# Patient Record
Sex: FEMALE | Race: WHITE | NOT HISPANIC OR LATINO | Employment: FULL TIME | URBAN - METROPOLITAN AREA
[De-identification: names, ages, dates, MRNs, and addresses within clinical notes are randomized per-mention and may not be internally consistent; named-entity substitution may affect disease eponyms.]

---

## 2017-01-05 ENCOUNTER — ALLSCRIPTS OFFICE VISIT (OUTPATIENT)
Dept: PERINATAL CARE | Facility: CLINIC | Age: 37
End: 2017-01-05
Payer: COMMERCIAL

## 2017-01-05 PROCEDURE — 96372 THER/PROPH/DIAG INJ SC/IM: CPT | Performed by: OBSTETRICS & GYNECOLOGY

## 2017-01-13 ENCOUNTER — APPOINTMENT (OUTPATIENT)
Dept: PERINATAL CARE | Facility: CLINIC | Age: 37
End: 2017-01-13
Payer: COMMERCIAL

## 2017-01-13 ENCOUNTER — GENERIC CONVERSION - ENCOUNTER (OUTPATIENT)
Dept: OTHER | Facility: OTHER | Age: 37
End: 2017-01-13

## 2017-01-13 PROCEDURE — 96372 THER/PROPH/DIAG INJ SC/IM: CPT | Performed by: OBSTETRICS & GYNECOLOGY

## 2017-01-19 ENCOUNTER — GENERIC CONVERSION - ENCOUNTER (OUTPATIENT)
Dept: OTHER | Facility: OTHER | Age: 37
End: 2017-01-19

## 2017-01-19 ENCOUNTER — APPOINTMENT (OUTPATIENT)
Dept: PERINATAL CARE | Facility: CLINIC | Age: 37
End: 2017-01-19
Payer: COMMERCIAL

## 2017-01-19 ENCOUNTER — ALLSCRIPTS OFFICE VISIT (OUTPATIENT)
Dept: PERINATAL CARE | Facility: CLINIC | Age: 37
End: 2017-01-19
Payer: COMMERCIAL

## 2017-01-19 PROCEDURE — 76817 TRANSVAGINAL US OBSTETRIC: CPT | Performed by: OBSTETRICS & GYNECOLOGY

## 2017-01-19 PROCEDURE — 76805 OB US >/= 14 WKS SNGL FETUS: CPT | Performed by: OBSTETRICS & GYNECOLOGY

## 2017-01-26 ENCOUNTER — APPOINTMENT (OUTPATIENT)
Dept: PERINATAL CARE | Facility: CLINIC | Age: 37
End: 2017-01-26
Payer: COMMERCIAL

## 2017-01-26 ENCOUNTER — GENERIC CONVERSION - ENCOUNTER (OUTPATIENT)
Dept: OTHER | Facility: OTHER | Age: 37
End: 2017-01-26

## 2017-01-26 ENCOUNTER — ALLSCRIPTS OFFICE VISIT (OUTPATIENT)
Dept: OTHER | Facility: OTHER | Age: 37
End: 2017-01-26

## 2017-01-26 PROCEDURE — 96372 THER/PROPH/DIAG INJ SC/IM: CPT | Performed by: OBSTETRICS & GYNECOLOGY

## 2017-02-02 ENCOUNTER — GENERIC CONVERSION - ENCOUNTER (OUTPATIENT)
Dept: OTHER | Facility: OTHER | Age: 37
End: 2017-02-02

## 2017-02-02 ENCOUNTER — APPOINTMENT (OUTPATIENT)
Dept: PERINATAL CARE | Facility: CLINIC | Age: 37
End: 2017-02-02
Payer: COMMERCIAL

## 2017-02-02 PROCEDURE — 96372 THER/PROPH/DIAG INJ SC/IM: CPT | Performed by: OBSTETRICS & GYNECOLOGY

## 2017-02-06 ENCOUNTER — HOSPITAL ENCOUNTER (OUTPATIENT)
Facility: HOSPITAL | Age: 37
Discharge: HOME | End: 2017-02-06
Attending: OBSTETRICS & GYNECOLOGY | Admitting: OBSTETRICS & GYNECOLOGY
Payer: COMMERCIAL

## 2017-02-06 VITALS
DIASTOLIC BLOOD PRESSURE: 61 MMHG | HEART RATE: 95 BPM | HEIGHT: 66 IN | WEIGHT: 163 LBS | SYSTOLIC BLOOD PRESSURE: 121 MMHG | RESPIRATION RATE: 18 BRPM | BODY MASS INDEX: 26.2 KG/M2 | TEMPERATURE: 98.1 F

## 2017-02-06 DIAGNOSIS — O44.02 PLACENTA PREVIA ANTEPARTUM IN SECOND TRIMESTER: ICD-10-CM

## 2017-02-06 DIAGNOSIS — O26.892 PELVIC PAIN AFFECTING PREGNANCY IN SECOND TRIMESTER, ANTEPARTUM: ICD-10-CM

## 2017-02-06 DIAGNOSIS — R10.2 PELVIC PAIN AFFECTING PREGNANCY IN SECOND TRIMESTER, ANTEPARTUM: ICD-10-CM

## 2017-02-06 PROCEDURE — 99204 OFFICE O/P NEW MOD 45 MIN: CPT

## 2017-02-06 RX ORDER — HYDROXYPROGESTERONE CAPROATE 250 MG/ML
250 INJECTION INTRAMUSCULAR
Status: ON HOLD | COMMUNITY
End: 2017-05-15 | Stop reason: ALTCHOICE

## 2017-02-06 RX ORDER — ACETAMINOPHEN 325 MG/1
650 TABLET ORAL EVERY 6 HOURS PRN
Status: ON HOLD | COMMUNITY
End: 2017-05-26 | Stop reason: CLARIF

## 2017-02-09 ENCOUNTER — ALLSCRIPTS OFFICE VISIT (OUTPATIENT)
Dept: PERINATAL CARE | Facility: CLINIC | Age: 37
End: 2017-02-09
Payer: COMMERCIAL

## 2017-02-09 ENCOUNTER — GENERIC CONVERSION - ENCOUNTER (OUTPATIENT)
Dept: OTHER | Facility: OTHER | Age: 37
End: 2017-02-09

## 2017-02-09 PROCEDURE — 76816 OB US FOLLOW-UP PER FETUS: CPT | Performed by: OBSTETRICS & GYNECOLOGY

## 2017-02-09 PROCEDURE — 76817 TRANSVAGINAL US OBSTETRIC: CPT | Performed by: OBSTETRICS & GYNECOLOGY

## 2017-02-15 ENCOUNTER — ALLSCRIPTS OFFICE VISIT (OUTPATIENT)
Dept: OTHER | Facility: OTHER | Age: 37
End: 2017-02-15

## 2017-02-15 DIAGNOSIS — O99.810 ABNORMAL GLUCOSE COMPLICATING PREGNANCY: ICD-10-CM

## 2017-02-15 DIAGNOSIS — Z36.9 ENCOUNTER FOR ANTENATAL SCREENING OF MOTHER: ICD-10-CM

## 2017-02-15 LAB
GLUCOSE (HISTORICAL): NORMAL
PROT UR STRIP-MCNC: NORMAL MG/DL

## 2017-02-16 ENCOUNTER — APPOINTMENT (OUTPATIENT)
Dept: PERINATAL CARE | Facility: CLINIC | Age: 37
End: 2017-02-16
Payer: COMMERCIAL

## 2017-02-16 ENCOUNTER — GENERIC CONVERSION - ENCOUNTER (OUTPATIENT)
Dept: OTHER | Facility: OTHER | Age: 37
End: 2017-02-16

## 2017-02-16 PROCEDURE — 96372 THER/PROPH/DIAG INJ SC/IM: CPT | Performed by: OBSTETRICS & GYNECOLOGY

## 2017-02-17 ENCOUNTER — GENERIC CONVERSION - ENCOUNTER (OUTPATIENT)
Dept: OTHER | Facility: OTHER | Age: 37
End: 2017-02-17

## 2017-02-23 ENCOUNTER — ALLSCRIPTS OFFICE VISIT (OUTPATIENT)
Dept: PERINATAL CARE | Facility: CLINIC | Age: 37
End: 2017-02-23
Payer: COMMERCIAL

## 2017-02-23 PROCEDURE — 96372 THER/PROPH/DIAG INJ SC/IM: CPT | Performed by: OBSTETRICS & GYNECOLOGY

## 2017-02-25 ENCOUNTER — APPOINTMENT (OUTPATIENT)
Dept: LAB | Facility: CLINIC | Age: 37
End: 2017-02-25
Payer: COMMERCIAL

## 2017-02-25 DIAGNOSIS — Z36.9 ENCOUNTER FOR ANTENATAL SCREENING OF MOTHER: ICD-10-CM

## 2017-02-25 LAB
BASOPHILS # BLD AUTO: 0.02 THOUSANDS/ΜL (ref 0–0.1)
BASOPHILS NFR BLD AUTO: 0 % (ref 0–1)
EOSINOPHIL # BLD AUTO: 0.11 THOUSAND/ΜL (ref 0–0.61)
EOSINOPHIL NFR BLD AUTO: 1 % (ref 0–6)
ERYTHROCYTE [DISTWIDTH] IN BLOOD BY AUTOMATED COUNT: 14.7 % (ref 11.6–15.1)
GLUCOSE 1H P 50 G GLC PO SERPL-MCNC: 145 MG/DL
HCT VFR BLD AUTO: 35.5 % (ref 34.8–46.1)
HGB BLD-MCNC: 11.8 G/DL (ref 11.5–15.4)
LYMPHOCYTES # BLD AUTO: 2.49 THOUSANDS/ΜL (ref 0.6–4.47)
LYMPHOCYTES NFR BLD AUTO: 20 % (ref 14–44)
MCH RBC QN AUTO: 29.1 PG (ref 26.8–34.3)
MCHC RBC AUTO-ENTMCNC: 33.2 G/DL (ref 31.4–37.4)
MCV RBC AUTO: 87 FL (ref 82–98)
MONOCYTES # BLD AUTO: 0.47 THOUSAND/ΜL (ref 0.17–1.22)
MONOCYTES NFR BLD AUTO: 4 % (ref 4–12)
NEUTROPHILS # BLD AUTO: 9.41 THOUSANDS/ΜL (ref 1.85–7.62)
NEUTS SEG NFR BLD AUTO: 75 % (ref 43–75)
NRBC BLD AUTO-RTO: 0 /100 WBCS
PLATELET # BLD AUTO: 262 THOUSANDS/UL (ref 149–390)
PMV BLD AUTO: 10.5 FL (ref 8.9–12.7)
RBC # BLD AUTO: 4.06 MILLION/UL (ref 3.81–5.12)
WBC # BLD AUTO: 12.55 THOUSAND/UL (ref 4.31–10.16)

## 2017-02-25 PROCEDURE — 85025 COMPLETE CBC W/AUTO DIFF WBC: CPT

## 2017-02-25 PROCEDURE — 86592 SYPHILIS TEST NON-TREP QUAL: CPT

## 2017-02-25 PROCEDURE — 82950 GLUCOSE TEST: CPT

## 2017-02-25 PROCEDURE — 36415 COLL VENOUS BLD VENIPUNCTURE: CPT

## 2017-02-27 LAB — RPR SER QL: NORMAL

## 2017-03-01 ENCOUNTER — APPOINTMENT (OUTPATIENT)
Dept: LAB | Facility: HOSPITAL | Age: 37
End: 2017-03-01
Attending: OBSTETRICS & GYNECOLOGY
Payer: COMMERCIAL

## 2017-03-01 DIAGNOSIS — O99.810 ABNORMAL GLUCOSE COMPLICATING PREGNANCY: ICD-10-CM

## 2017-03-01 LAB
GLUCOSE 1H P 100 G GLC PO SERPL-MCNC: 175 MG/DL (ref 65–179)
GLUCOSE 2H P 100 G GLC PO SERPL-MCNC: 164 MG/DL (ref 65–154)
GLUCOSE 3H P 100 G GLC PO SERPL-MCNC: 92 MG/DL (ref 65–139)
GLUCOSE P FAST SERPL-MCNC: 94 MG/DL (ref 65–99)
GLUCOSE SERPL-MCNC: 104 MG/DL (ref 65–140)

## 2017-03-01 PROCEDURE — 82951 GLUCOSE TOLERANCE TEST (GTT): CPT

## 2017-03-01 PROCEDURE — 36415 COLL VENOUS BLD VENIPUNCTURE: CPT

## 2017-03-01 PROCEDURE — 82948 REAGENT STRIP/BLOOD GLUCOSE: CPT

## 2017-03-01 PROCEDURE — 82952 GTT-ADDED SAMPLES: CPT

## 2017-03-02 ENCOUNTER — GENERIC CONVERSION - ENCOUNTER (OUTPATIENT)
Dept: OTHER | Facility: OTHER | Age: 37
End: 2017-03-02

## 2017-03-02 ENCOUNTER — APPOINTMENT (OUTPATIENT)
Dept: PERINATAL CARE | Facility: CLINIC | Age: 37
End: 2017-03-02
Payer: COMMERCIAL

## 2017-03-02 PROCEDURE — 96372 THER/PROPH/DIAG INJ SC/IM: CPT | Performed by: OBSTETRICS & GYNECOLOGY

## 2017-03-09 ENCOUNTER — GENERIC CONVERSION - ENCOUNTER (OUTPATIENT)
Dept: OTHER | Facility: OTHER | Age: 37
End: 2017-03-09

## 2017-03-09 ENCOUNTER — APPOINTMENT (OUTPATIENT)
Dept: PERINATAL CARE | Facility: CLINIC | Age: 37
End: 2017-03-09
Payer: COMMERCIAL

## 2017-03-09 PROCEDURE — 96372 THER/PROPH/DIAG INJ SC/IM: CPT | Performed by: OBSTETRICS & GYNECOLOGY

## 2017-03-13 ENCOUNTER — GENERIC CONVERSION - ENCOUNTER (OUTPATIENT)
Dept: OTHER | Facility: OTHER | Age: 37
End: 2017-03-13

## 2017-03-16 ENCOUNTER — ALLSCRIPTS OFFICE VISIT (OUTPATIENT)
Dept: PERINATAL CARE | Facility: CLINIC | Age: 37
End: 2017-03-16
Payer: COMMERCIAL

## 2017-03-16 PROCEDURE — 96372 THER/PROPH/DIAG INJ SC/IM: CPT | Performed by: OBSTETRICS & GYNECOLOGY

## 2017-03-17 ENCOUNTER — ALLSCRIPTS OFFICE VISIT (OUTPATIENT)
Dept: OTHER | Facility: OTHER | Age: 37
End: 2017-03-17

## 2017-03-17 ENCOUNTER — GENERIC CONVERSION - ENCOUNTER (OUTPATIENT)
Dept: OTHER | Facility: OTHER | Age: 37
End: 2017-03-17

## 2017-03-23 ENCOUNTER — ALLSCRIPTS OFFICE VISIT (OUTPATIENT)
Dept: PERINATAL CARE | Facility: CLINIC | Age: 37
End: 2017-03-23
Payer: COMMERCIAL

## 2017-03-23 ENCOUNTER — GENERIC CONVERSION - ENCOUNTER (OUTPATIENT)
Dept: OTHER | Facility: OTHER | Age: 37
End: 2017-03-23

## 2017-03-23 PROCEDURE — 96372 THER/PROPH/DIAG INJ SC/IM: CPT | Performed by: OBSTETRICS & GYNECOLOGY

## 2017-03-23 PROCEDURE — 76816 OB US FOLLOW-UP PER FETUS: CPT | Performed by: OBSTETRICS & GYNECOLOGY

## 2017-03-30 ENCOUNTER — APPOINTMENT (OUTPATIENT)
Dept: PERINATAL CARE | Facility: CLINIC | Age: 37
End: 2017-03-30
Payer: COMMERCIAL

## 2017-03-30 ENCOUNTER — GENERIC CONVERSION - ENCOUNTER (OUTPATIENT)
Dept: OTHER | Facility: OTHER | Age: 37
End: 2017-03-30

## 2017-03-30 PROCEDURE — 96372 THER/PROPH/DIAG INJ SC/IM: CPT | Performed by: OBSTETRICS & GYNECOLOGY

## 2017-04-04 ENCOUNTER — GENERIC CONVERSION - ENCOUNTER (OUTPATIENT)
Dept: OTHER | Facility: OTHER | Age: 37
End: 2017-04-04

## 2017-04-06 ENCOUNTER — APPOINTMENT (OUTPATIENT)
Dept: PERINATAL CARE | Facility: CLINIC | Age: 37
End: 2017-04-06
Payer: COMMERCIAL

## 2017-04-06 ENCOUNTER — GENERIC CONVERSION - ENCOUNTER (OUTPATIENT)
Dept: OTHER | Facility: OTHER | Age: 37
End: 2017-04-06

## 2017-04-06 PROCEDURE — 96372 THER/PROPH/DIAG INJ SC/IM: CPT | Performed by: OBSTETRICS & GYNECOLOGY

## 2017-04-13 ENCOUNTER — APPOINTMENT (OUTPATIENT)
Dept: PERINATAL CARE | Facility: CLINIC | Age: 37
End: 2017-04-13
Payer: COMMERCIAL

## 2017-04-13 ENCOUNTER — GENERIC CONVERSION - ENCOUNTER (OUTPATIENT)
Dept: OTHER | Facility: OTHER | Age: 37
End: 2017-04-13

## 2017-04-13 PROCEDURE — 96372 THER/PROPH/DIAG INJ SC/IM: CPT | Performed by: OBSTETRICS & GYNECOLOGY

## 2017-04-19 ENCOUNTER — GENERIC CONVERSION - ENCOUNTER (OUTPATIENT)
Dept: OTHER | Facility: OTHER | Age: 37
End: 2017-04-19

## 2017-04-20 ENCOUNTER — APPOINTMENT (OUTPATIENT)
Dept: PERINATAL CARE | Facility: CLINIC | Age: 37
End: 2017-04-20
Payer: COMMERCIAL

## 2017-04-20 ENCOUNTER — GENERIC CONVERSION - ENCOUNTER (OUTPATIENT)
Dept: OTHER | Facility: OTHER | Age: 37
End: 2017-04-20

## 2017-04-20 PROCEDURE — 96372 THER/PROPH/DIAG INJ SC/IM: CPT | Performed by: OBSTETRICS & GYNECOLOGY

## 2017-04-27 ENCOUNTER — APPOINTMENT (OUTPATIENT)
Dept: PERINATAL CARE | Facility: CLINIC | Age: 37
End: 2017-04-27
Payer: COMMERCIAL

## 2017-04-27 PROCEDURE — 96372 THER/PROPH/DIAG INJ SC/IM: CPT | Performed by: OBSTETRICS & GYNECOLOGY

## 2017-04-28 ENCOUNTER — HOSPITAL ENCOUNTER (OUTPATIENT)
Facility: HOSPITAL | Age: 37
Discharge: HOME | End: 2017-04-28
Attending: OBSTETRICS & GYNECOLOGY | Admitting: OBSTETRICS & GYNECOLOGY
Payer: COMMERCIAL

## 2017-04-28 ENCOUNTER — HOSPITAL ENCOUNTER (OUTPATIENT)
Facility: HOSPITAL | Age: 37
End: 2017-04-28
Attending: OBSTETRICS & GYNECOLOGY | Admitting: OBSTETRICS & GYNECOLOGY
Payer: COMMERCIAL

## 2017-04-28 VITALS
OXYGEN SATURATION: 100 % | DIASTOLIC BLOOD PRESSURE: 71 MMHG | BODY MASS INDEX: 29.66 KG/M2 | HEART RATE: 107 BPM | SYSTOLIC BLOOD PRESSURE: 126 MMHG | HEIGHT: 65 IN | WEIGHT: 178 LBS | RESPIRATION RATE: 18 BRPM | TEMPERATURE: 98.1 F

## 2017-04-28 PROCEDURE — 76815 OB US LIMITED FETUS(S): CPT | Performed by: OBSTETRICS & GYNECOLOGY

## 2017-04-28 PROCEDURE — 99214 OFFICE O/P EST MOD 30 MIN: CPT

## 2017-05-02 ENCOUNTER — GENERIC CONVERSION - ENCOUNTER (OUTPATIENT)
Dept: OTHER | Facility: OTHER | Age: 37
End: 2017-05-02

## 2017-05-02 LAB — EXTERNAL GROUP B STREP ANTIGEN: NEGATIVE

## 2017-05-04 ENCOUNTER — APPOINTMENT (OUTPATIENT)
Dept: PERINATAL CARE | Facility: CLINIC | Age: 37
End: 2017-05-04
Payer: COMMERCIAL

## 2017-05-04 ENCOUNTER — GENERIC CONVERSION - ENCOUNTER (OUTPATIENT)
Dept: OTHER | Facility: OTHER | Age: 37
End: 2017-05-04

## 2017-05-04 PROCEDURE — 96372 THER/PROPH/DIAG INJ SC/IM: CPT | Performed by: OBSTETRICS & GYNECOLOGY

## 2017-05-11 ENCOUNTER — GENERIC CONVERSION - ENCOUNTER (OUTPATIENT)
Dept: OTHER | Facility: OTHER | Age: 37
End: 2017-05-11

## 2017-05-11 ENCOUNTER — ALLSCRIPTS OFFICE VISIT (OUTPATIENT)
Dept: PERINATAL CARE | Facility: CLINIC | Age: 37
End: 2017-05-11
Payer: COMMERCIAL

## 2017-05-11 ENCOUNTER — ALLSCRIPTS OFFICE VISIT (OUTPATIENT)
Dept: OTHER | Facility: OTHER | Age: 37
End: 2017-05-11

## 2017-05-11 PROCEDURE — 76816 OB US FOLLOW-UP PER FETUS: CPT | Performed by: OBSTETRICS & GYNECOLOGY

## 2017-05-11 PROCEDURE — 96372 THER/PROPH/DIAG INJ SC/IM: CPT | Performed by: OBSTETRICS & GYNECOLOGY

## 2017-05-15 ENCOUNTER — HOSPITAL ENCOUNTER (OUTPATIENT)
Facility: HOSPITAL | Age: 37
Discharge: HOME | End: 2017-05-15
Attending: OBSTETRICS & GYNECOLOGY | Admitting: OBSTETRICS & GYNECOLOGY
Payer: COMMERCIAL

## 2017-05-15 VITALS
HEART RATE: 106 BPM | RESPIRATION RATE: 18 BRPM | SYSTOLIC BLOOD PRESSURE: 133 MMHG | DIASTOLIC BLOOD PRESSURE: 77 MMHG | TEMPERATURE: 97.6 F | OXYGEN SATURATION: 98 %

## 2017-05-15 PROCEDURE — 99213 OFFICE O/P EST LOW 20 MIN: CPT

## 2017-05-15 RX ORDER — AMOXICILLIN AND CLAVULANATE POTASSIUM 875; 125 MG/1; MG/1
1 TABLET, FILM COATED ORAL 2 TIMES DAILY
Status: ON HOLD | COMMUNITY
End: 2017-05-26 | Stop reason: ALTCHOICE

## 2017-05-17 ENCOUNTER — ALLSCRIPTS OFFICE VISIT (OUTPATIENT)
Dept: OTHER | Facility: OTHER | Age: 37
End: 2017-05-17

## 2017-05-23 ENCOUNTER — ALLSCRIPTS OFFICE VISIT (OUTPATIENT)
Dept: PERINATAL CARE | Facility: CLINIC | Age: 37
End: 2017-05-23
Payer: COMMERCIAL

## 2017-05-23 ENCOUNTER — GENERIC CONVERSION - ENCOUNTER (OUTPATIENT)
Dept: OTHER | Facility: OTHER | Age: 37
End: 2017-05-23

## 2017-05-23 PROCEDURE — 76818 FETAL BIOPHYS PROFILE W/NST: CPT | Performed by: OBSTETRICS & GYNECOLOGY

## 2017-05-26 ENCOUNTER — ANESTHESIA (INPATIENT)
Dept: LABOR AND DELIVERY | Facility: HOSPITAL | Age: 37
End: 2017-05-26
Payer: COMMERCIAL

## 2017-05-26 ENCOUNTER — ANESTHESIA EVENT (INPATIENT)
Dept: LABOR AND DELIVERY | Facility: HOSPITAL | Age: 37
End: 2017-05-26
Payer: COMMERCIAL

## 2017-05-26 ENCOUNTER — HOSPITAL ENCOUNTER (INPATIENT)
Facility: HOSPITAL | Age: 37
LOS: 3 days | Discharge: HOME | End: 2017-05-29
Attending: OBSTETRICS & GYNECOLOGY | Admitting: OBSTETRICS & GYNECOLOGY
Payer: COMMERCIAL

## 2017-05-26 DIAGNOSIS — Z98.891 PREVIOUS CESAREAN SECTION: ICD-10-CM

## 2017-05-26 DIAGNOSIS — Z3A.38 38 WEEKS GESTATION OF PREGNANCY: Primary | ICD-10-CM

## 2017-05-26 DIAGNOSIS — O42.90 PREMATURE RUPTURE OF MEMBRANES: ICD-10-CM

## 2017-05-26 LAB
ABO GROUP BLD: NORMAL
BASE EXCESS BLDCOA CALC-SCNC: 0.6 MMOL/L (ref 3–11)
BASE EXCESS BLDCOV CALC-SCNC: -0.4 MMOL/L (ref 1–9)
BASOPHILS # BLD AUTO: 0.02 THOUSANDS/ΜL (ref 0–0.1)
BASOPHILS NFR BLD AUTO: 0 % (ref 0–1)
BLD GP AB SCN SERPL QL: NEGATIVE
EOSINOPHIL # BLD AUTO: 0.08 THOUSAND/ΜL (ref 0–0.61)
EOSINOPHIL NFR BLD AUTO: 1 % (ref 0–6)
ERYTHROCYTE [DISTWIDTH] IN BLOOD BY AUTOMATED COUNT: 14 % (ref 11.6–15.1)
HCO3 BLDCOA-SCNC: 29.2 MMOL/L (ref 17.3–27.3)
HCO3 BLDCOV-SCNC: 26 MMOL/L (ref 12.2–28.6)
HCT VFR BLD AUTO: 39.9 % (ref 34.8–46.1)
HGB BLD-MCNC: 13.5 G/DL (ref 11.5–15.4)
LYMPHOCYTES # BLD AUTO: 2.47 THOUSANDS/ΜL (ref 0.6–4.47)
LYMPHOCYTES NFR BLD AUTO: 19 % (ref 14–44)
MCH RBC QN AUTO: 28.8 PG (ref 26.8–34.3)
MCHC RBC AUTO-ENTMCNC: 33.8 G/DL (ref 31.4–37.4)
MCV RBC AUTO: 85 FL (ref 82–98)
MONOCYTES # BLD AUTO: 0.52 THOUSAND/ΜL (ref 0.17–1.22)
MONOCYTES NFR BLD AUTO: 4 % (ref 4–12)
NEUTROPHILS # BLD AUTO: 9.84 THOUSANDS/ΜL (ref 1.85–7.62)
NEUTS SEG NFR BLD AUTO: 76 % (ref 43–75)
NRBC BLD AUTO-RTO: 0 /100 WBCS
O2 CT VFR BLDCOA CALC: 5.5 ML/DL
OXYHGB MFR BLDCOA: 24.7 %
OXYHGB MFR BLDCOV: 56 %
PCO2 BLDCOA: 63.2 MM[HG] (ref 30–60)
PCO2 BLDCOV: 48.9 MM HG (ref 27–43)
PH BLDCOA: 7.28 [PH] (ref 7.23–7.43)
PH BLDCOV: 7.34 [PH] (ref 7.19–7.49)
PLATELET # BLD AUTO: 244 THOUSANDS/UL (ref 149–390)
PMV BLD AUTO: 10.6 FL (ref 8.9–12.7)
PO2 BLDCOA: 14.5 MM HG (ref 5–25)
PO2 BLDCOV: 24.2 MM HG (ref 15–45)
RBC # BLD AUTO: 4.69 MILLION/UL (ref 3.81–5.12)
RH BLD: POSITIVE
RPR SER QL: NORMAL
SAO2 % BLDCOV: 12.6 ML/DL
SPECIMEN EXPIRATION DATE: NORMAL
WBC # BLD AUTO: 12.98 THOUSAND/UL (ref 4.31–10.16)

## 2017-05-26 PROCEDURE — 88302 TISSUE EXAM BY PATHOLOGIST: CPT | Performed by: OBSTETRICS & GYNECOLOGY

## 2017-05-26 PROCEDURE — 82805 BLOOD GASES W/O2 SATURATION: CPT | Performed by: OBSTETRICS & GYNECOLOGY

## 2017-05-26 PROCEDURE — 86900 BLOOD TYPING SEROLOGIC ABO: CPT | Performed by: OBSTETRICS & GYNECOLOGY

## 2017-05-26 PROCEDURE — 85025 COMPLETE CBC W/AUTO DIFF WBC: CPT | Performed by: OBSTETRICS & GYNECOLOGY

## 2017-05-26 PROCEDURE — 0UB70ZZ EXCISION OF BILATERAL FALLOPIAN TUBES, OPEN APPROACH: ICD-10-PCS | Performed by: OBSTETRICS & GYNECOLOGY

## 2017-05-26 PROCEDURE — 86592 SYPHILIS TEST NON-TREP QUAL: CPT | Performed by: OBSTETRICS & GYNECOLOGY

## 2017-05-26 PROCEDURE — 99214 OFFICE O/P EST MOD 30 MIN: CPT

## 2017-05-26 PROCEDURE — 86850 RBC ANTIBODY SCREEN: CPT | Performed by: OBSTETRICS & GYNECOLOGY

## 2017-05-26 PROCEDURE — 86901 BLOOD TYPING SEROLOGIC RH(D): CPT | Performed by: OBSTETRICS & GYNECOLOGY

## 2017-05-26 RX ORDER — SODIUM CHLORIDE, SODIUM LACTATE, POTASSIUM CHLORIDE, CALCIUM CHLORIDE 600; 310; 30; 20 MG/100ML; MG/100ML; MG/100ML; MG/100ML
100 INJECTION, SOLUTION INTRAVENOUS CONTINUOUS
Status: DISCONTINUED | OUTPATIENT
Start: 2017-05-26 | End: 2017-05-29 | Stop reason: HOSPADM

## 2017-05-26 RX ORDER — NALOXONE HYDROCHLORIDE 0.4 MG/ML
0.1 INJECTION, SOLUTION INTRAMUSCULAR; INTRAVENOUS; SUBCUTANEOUS
Status: ACTIVE | OUTPATIENT
Start: 2017-05-26 | End: 2017-05-27

## 2017-05-26 RX ORDER — ONDANSETRON 2 MG/ML
4 INJECTION INTRAMUSCULAR; INTRAVENOUS ONCE AS NEEDED
Status: DISCONTINUED | OUTPATIENT
Start: 2017-05-26 | End: 2017-05-29 | Stop reason: HOSPADM

## 2017-05-26 RX ORDER — ONDANSETRON 2 MG/ML
4 INJECTION INTRAMUSCULAR; INTRAVENOUS EVERY 8 HOURS PRN
Status: DISCONTINUED | OUTPATIENT
Start: 2017-05-26 | End: 2017-05-29 | Stop reason: HOSPADM

## 2017-05-26 RX ORDER — SODIUM CHLORIDE, SODIUM LACTATE, POTASSIUM CHLORIDE, CALCIUM CHLORIDE 600; 310; 30; 20 MG/100ML; MG/100ML; MG/100ML; MG/100ML
125 INJECTION, SOLUTION INTRAVENOUS CONTINUOUS
Status: DISCONTINUED | OUTPATIENT
Start: 2017-05-26 | End: 2017-05-29 | Stop reason: HOSPADM

## 2017-05-26 RX ORDER — DIPHENHYDRAMINE HYDROCHLORIDE 50 MG/ML
25 INJECTION INTRAMUSCULAR; INTRAVENOUS EVERY 6 HOURS PRN
Status: ACTIVE | OUTPATIENT
Start: 2017-05-26 | End: 2017-05-27

## 2017-05-26 RX ORDER — CALCIUM CARBONATE 200(500)MG
1000 TABLET,CHEWABLE ORAL DAILY PRN
Status: DISCONTINUED | OUTPATIENT
Start: 2017-05-26 | End: 2017-05-29 | Stop reason: HOSPADM

## 2017-05-26 RX ORDER — FENTANYL CITRATE 50 UG/ML
INJECTION, SOLUTION INTRAMUSCULAR; INTRAVENOUS AS NEEDED
Status: DISCONTINUED | OUTPATIENT
Start: 2017-05-26 | End: 2017-05-26 | Stop reason: SURG

## 2017-05-26 RX ORDER — DIPHENHYDRAMINE HYDROCHLORIDE 50 MG/ML
25 INJECTION INTRAMUSCULAR; INTRAVENOUS EVERY 6 HOURS PRN
Status: DISCONTINUED | OUTPATIENT
Start: 2017-05-26 | End: 2017-05-29 | Stop reason: HOSPADM

## 2017-05-26 RX ORDER — ACETAMINOPHEN 325 MG/1
650 TABLET ORAL EVERY 6 HOURS PRN
Status: DISCONTINUED | OUTPATIENT
Start: 2017-05-26 | End: 2017-05-29 | Stop reason: HOSPADM

## 2017-05-26 RX ORDER — KETOROLAC TROMETHAMINE 30 MG/ML
30 INJECTION, SOLUTION INTRAMUSCULAR; INTRAVENOUS EVERY 6 HOURS
Status: DISCONTINUED | OUTPATIENT
Start: 2017-05-26 | End: 2017-05-29 | Stop reason: HOSPADM

## 2017-05-26 RX ORDER — BUPIVACAINE HYDROCHLORIDE 7.5 MG/ML
INJECTION, SOLUTION INTRASPINAL AS NEEDED
Status: DISCONTINUED | OUTPATIENT
Start: 2017-05-26 | End: 2017-05-26 | Stop reason: SURG

## 2017-05-26 RX ORDER — DIAPER,BRIEF,INFANT-TODD,DISP
1 EACH MISCELLANEOUS DAILY PRN
Status: DISCONTINUED | OUTPATIENT
Start: 2017-05-26 | End: 2017-05-29 | Stop reason: HOSPADM

## 2017-05-26 RX ORDER — SODIUM CHLORIDE 9 MG/ML
INJECTION, SOLUTION INTRAVENOUS CONTINUOUS PRN
Status: DISCONTINUED | OUTPATIENT
Start: 2017-05-26 | End: 2017-05-26

## 2017-05-26 RX ORDER — MIDAZOLAM HYDROCHLORIDE 1 MG/ML
INJECTION INTRAMUSCULAR; INTRAVENOUS AS NEEDED
Status: DISCONTINUED | OUTPATIENT
Start: 2017-05-26 | End: 2017-05-26 | Stop reason: SURG

## 2017-05-26 RX ORDER — ONDANSETRON 2 MG/ML
4 INJECTION INTRAMUSCULAR; INTRAVENOUS EVERY 8 HOURS PRN
Status: DISCONTINUED | OUTPATIENT
Start: 2017-05-26 | End: 2017-05-26

## 2017-05-26 RX ORDER — MORPHINE SULFATE 4 MG/ML
4 INJECTION, SOLUTION INTRAMUSCULAR; INTRAVENOUS
Status: DISCONTINUED | OUTPATIENT
Start: 2017-05-26 | End: 2017-05-29 | Stop reason: HOSPADM

## 2017-05-26 RX ORDER — KETOROLAC TROMETHAMINE 30 MG/ML
30 INJECTION, SOLUTION INTRAMUSCULAR; INTRAVENOUS ONCE AS NEEDED
Status: ACTIVE | OUTPATIENT
Start: 2017-05-26 | End: 2017-05-26

## 2017-05-26 RX ORDER — OXYCODONE HYDROCHLORIDE AND ACETAMINOPHEN 5; 325 MG/1; MG/1
2 TABLET ORAL EVERY 4 HOURS PRN
Status: DISCONTINUED | OUTPATIENT
Start: 2017-05-27 | End: 2017-05-29 | Stop reason: HOSPADM

## 2017-05-26 RX ORDER — FENTANYL CITRATE/PF 50 MCG/ML
25 SYRINGE (ML) INJECTION
Status: DISCONTINUED | OUTPATIENT
Start: 2017-05-26 | End: 2017-05-29 | Stop reason: HOSPADM

## 2017-05-26 RX ORDER — OXYCODONE HYDROCHLORIDE AND ACETAMINOPHEN 5; 325 MG/1; MG/1
1 TABLET ORAL EVERY 4 HOURS PRN
Status: DISCONTINUED | OUTPATIENT
Start: 2017-05-27 | End: 2017-05-29 | Stop reason: HOSPADM

## 2017-05-26 RX ORDER — ONDANSETRON 2 MG/ML
4 INJECTION INTRAMUSCULAR; INTRAVENOUS EVERY 4 HOURS PRN
Status: ACTIVE | OUTPATIENT
Start: 2017-05-26 | End: 2017-05-27

## 2017-05-26 RX ORDER — MEPERIDINE HYDROCHLORIDE 25 MG/ML
25 INJECTION INTRAMUSCULAR; INTRAVENOUS; SUBCUTANEOUS AS NEEDED
Status: DISCONTINUED | OUTPATIENT
Start: 2017-05-26 | End: 2017-05-29 | Stop reason: HOSPADM

## 2017-05-26 RX ORDER — CLINDAMYCIN PHOSPHATE 900 MG/50ML
900 INJECTION INTRAVENOUS ONCE
Status: COMPLETED | OUTPATIENT
Start: 2017-05-26 | End: 2017-05-26

## 2017-05-26 RX ORDER — METOCLOPRAMIDE HYDROCHLORIDE 5 MG/ML
5 INJECTION INTRAMUSCULAR; INTRAVENOUS EVERY 6 HOURS PRN
Status: ACTIVE | OUTPATIENT
Start: 2017-05-26 | End: 2017-05-27

## 2017-05-26 RX ORDER — SIMETHICONE 80 MG
80 TABLET,CHEWABLE ORAL 4 TIMES DAILY PRN
Status: DISCONTINUED | OUTPATIENT
Start: 2017-05-26 | End: 2017-05-29 | Stop reason: HOSPADM

## 2017-05-26 RX ORDER — DOCUSATE SODIUM 100 MG/1
100 CAPSULE, LIQUID FILLED ORAL 2 TIMES DAILY
Status: DISCONTINUED | OUTPATIENT
Start: 2017-05-26 | End: 2017-05-29 | Stop reason: HOSPADM

## 2017-05-26 RX ORDER — MORPHINE SULFATE 0.5 MG/ML
INJECTION, SOLUTION EPIDURAL; INTRATHECAL; INTRAVENOUS AS NEEDED
Status: DISCONTINUED | OUTPATIENT
Start: 2017-05-26 | End: 2017-05-26 | Stop reason: SURG

## 2017-05-26 RX ORDER — IBUPROFEN 600 MG/1
600 TABLET ORAL EVERY 6 HOURS PRN
Status: DISCONTINUED | OUTPATIENT
Start: 2017-05-26 | End: 2017-05-29 | Stop reason: HOSPADM

## 2017-05-26 RX ORDER — OXYCODONE HYDROCHLORIDE AND ACETAMINOPHEN 5; 325 MG/1; MG/1
2 TABLET ORAL EVERY 4 HOURS PRN
Status: DISCONTINUED | OUTPATIENT
Start: 2017-05-26 | End: 2017-05-29 | Stop reason: HOSPADM

## 2017-05-26 RX ORDER — NALBUPHINE HCL 10 MG/ML
5 AMPUL (ML) INJECTION
Status: DISPENSED | OUTPATIENT
Start: 2017-05-26 | End: 2017-05-27

## 2017-05-26 RX ADMIN — KETOROLAC TROMETHAMINE 30 MG: 30 INJECTION, SOLUTION INTRAMUSCULAR at 18:42

## 2017-05-26 RX ADMIN — KETOROLAC TROMETHAMINE 30 MG: 30 INJECTION, SOLUTION INTRAMUSCULAR at 12:49

## 2017-05-26 RX ADMIN — SODIUM CHLORIDE, SODIUM LACTATE, POTASSIUM CHLORIDE, AND CALCIUM CHLORIDE: .6; .31; .03; .02 INJECTION, SOLUTION INTRAVENOUS at 10:35

## 2017-05-26 RX ADMIN — NALBUPHINE HYDROCHLORIDE 5 MG: 10 INJECTION, SOLUTION INTRAMUSCULAR; INTRAVENOUS; SUBCUTANEOUS at 22:36

## 2017-05-26 RX ADMIN — PHENYLEPHRINE HYDROCHLORIDE 50 MCG/MIN: 10 INJECTION INTRAVENOUS at 09:45

## 2017-05-26 RX ADMIN — MIDAZOLAM HYDROCHLORIDE 1 MG: 1 INJECTION, SOLUTION INTRAMUSCULAR; INTRAVENOUS at 10:50

## 2017-05-26 RX ADMIN — SODIUM CHLORIDE, SODIUM LACTATE, POTASSIUM CHLORIDE, AND CALCIUM CHLORIDE 125 ML/HR: .6; .31; .03; .02 INJECTION, SOLUTION INTRAVENOUS at 08:59

## 2017-05-26 RX ADMIN — GENTAMICIN SULFATE 90 MG: 40 INJECTION, SOLUTION INTRAMUSCULAR; INTRAVENOUS at 09:40

## 2017-05-26 RX ADMIN — NALBUPHINE HYDROCHLORIDE 5 MG: 10 INJECTION, SOLUTION INTRAMUSCULAR; INTRAVENOUS; SUBCUTANEOUS at 18:45

## 2017-05-26 RX ADMIN — ONDANSETRON 4 MG: 2 INJECTION INTRAMUSCULAR; INTRAVENOUS at 10:35

## 2017-05-26 RX ADMIN — FENTANYL CITRATE 50 MCG: 50 INJECTION, SOLUTION INTRAMUSCULAR; INTRAVENOUS at 10:40

## 2017-05-26 RX ADMIN — NALBUPHINE HYDROCHLORIDE 5 MG: 10 INJECTION, SOLUTION INTRAMUSCULAR; INTRAVENOUS; SUBCUTANEOUS at 11:55

## 2017-05-26 RX ADMIN — AZITHROMYCIN MONOHYDRATE 500 MG: 500 INJECTION, POWDER, LYOPHILIZED, FOR SOLUTION INTRAVENOUS at 09:50

## 2017-05-26 RX ADMIN — SODIUM CHLORIDE, SODIUM LACTATE, POTASSIUM CHLORIDE, AND CALCIUM CHLORIDE 125 ML/HR: .6; .31; .03; .02 INJECTION, SOLUTION INTRAVENOUS at 14:01

## 2017-05-26 RX ADMIN — NALBUPHINE HYDROCHLORIDE 5 MG: 10 INJECTION, SOLUTION INTRAMUSCULAR; INTRAVENOUS; SUBCUTANEOUS at 14:54

## 2017-05-26 RX ADMIN — MORPHINE SULFATE 1 MG: 0.5 INJECTION, SOLUTION EPIDURAL; INTRATHECAL; INTRAVENOUS at 10:41

## 2017-05-26 RX ADMIN — FENTANYL CITRATE 50 MCG: 50 INJECTION, SOLUTION INTRAMUSCULAR; INTRAVENOUS at 10:45

## 2017-05-26 RX ADMIN — MORPHINE SULFATE 0.25 MG: 0.5 INJECTION, SOLUTION EPIDURAL; INTRATHECAL; INTRAVENOUS at 09:39

## 2017-05-26 RX ADMIN — OXYTOCIN 250 MILLI-UNITS/MIN: 10 INJECTION, SOLUTION INTRAMUSCULAR; INTRAVENOUS at 10:13

## 2017-05-26 RX ADMIN — SODIUM CHLORIDE, SODIUM LACTATE, POTASSIUM CHLORIDE, AND CALCIUM CHLORIDE 125 ML/HR: .6; .31; .03; .02 INJECTION, SOLUTION INTRAVENOUS at 08:15

## 2017-05-26 RX ADMIN — MORPHINE SULFATE 1 MG: 0.5 INJECTION, SOLUTION EPIDURAL; INTRATHECAL; INTRAVENOUS at 10:35

## 2017-05-26 RX ADMIN — MORPHINE SULFATE 2.75 MG: 0.5 INJECTION, SOLUTION EPIDURAL; INTRATHECAL; INTRAVENOUS at 10:30

## 2017-05-26 RX ADMIN — MIDAZOLAM HYDROCHLORIDE 1 MG: 1 INJECTION, SOLUTION INTRAMUSCULAR; INTRAVENOUS at 10:35

## 2017-05-26 RX ADMIN — MIDAZOLAM HYDROCHLORIDE 1 MG: 1 INJECTION, SOLUTION INTRAMUSCULAR; INTRAVENOUS at 10:30

## 2017-05-26 RX ADMIN — BUPIVACAINE HYDROCHLORIDE IN DEXTROSE 2 ML: 7.5 INJECTION, SOLUTION SUBARACHNOID at 09:39

## 2017-05-26 RX ADMIN — ONDANSETRON 4 MG: 2 INJECTION INTRAMUSCULAR; INTRAVENOUS at 14:37

## 2017-05-26 RX ADMIN — SODIUM CHLORIDE, SODIUM LACTATE, POTASSIUM CHLORIDE, AND CALCIUM CHLORIDE: .6; .31; .03; .02 INJECTION, SOLUTION INTRAVENOUS at 09:00

## 2017-05-26 RX ADMIN — CLINDAMYCIN PHOSPHATE 900 MG: 18 INJECTION, SOLUTION INTRAMUSCULAR; INTRAVENOUS at 09:29

## 2017-05-26 RX ADMIN — MIDAZOLAM HYDROCHLORIDE 1 MG: 1 INJECTION, SOLUTION INTRAMUSCULAR; INTRAVENOUS at 10:45

## 2017-05-27 LAB
BASOPHILS # BLD AUTO: 0.02 THOUSANDS/ΜL (ref 0–0.1)
BASOPHILS NFR BLD AUTO: 0 % (ref 0–1)
EOSINOPHIL # BLD AUTO: 0.06 THOUSAND/ΜL (ref 0–0.61)
EOSINOPHIL NFR BLD AUTO: 0 % (ref 0–6)
ERYTHROCYTE [DISTWIDTH] IN BLOOD BY AUTOMATED COUNT: 14.2 % (ref 11.6–15.1)
HCT VFR BLD AUTO: 30.5 % (ref 34.8–46.1)
HGB BLD-MCNC: 10.2 G/DL (ref 11.5–15.4)
LYMPHOCYTES # BLD AUTO: 2.41 THOUSANDS/ΜL (ref 0.6–4.47)
LYMPHOCYTES NFR BLD AUTO: 16 % (ref 14–44)
MCH RBC QN AUTO: 28.9 PG (ref 26.8–34.3)
MCHC RBC AUTO-ENTMCNC: 33.4 G/DL (ref 31.4–37.4)
MCV RBC AUTO: 86 FL (ref 82–98)
MONOCYTES # BLD AUTO: 0.75 THOUSAND/ΜL (ref 0.17–1.22)
MONOCYTES NFR BLD AUTO: 5 % (ref 4–12)
NEUTROPHILS # BLD AUTO: 11.41 THOUSANDS/ΜL (ref 1.85–7.62)
NEUTS SEG NFR BLD AUTO: 79 % (ref 43–75)
NRBC BLD AUTO-RTO: 0 /100 WBCS
PLATELET # BLD AUTO: 214 THOUSANDS/UL (ref 149–390)
PMV BLD AUTO: 10.7 FL (ref 8.9–12.7)
RBC # BLD AUTO: 3.53 MILLION/UL (ref 3.81–5.12)
WBC # BLD AUTO: 14.71 THOUSAND/UL (ref 4.31–10.16)

## 2017-05-27 PROCEDURE — 85025 COMPLETE CBC W/AUTO DIFF WBC: CPT | Performed by: OBSTETRICS & GYNECOLOGY

## 2017-05-27 RX ADMIN — NALBUPHINE HYDROCHLORIDE 5 MG: 10 INJECTION, SOLUTION INTRAMUSCULAR; INTRAVENOUS; SUBCUTANEOUS at 06:05

## 2017-05-27 RX ADMIN — KETOROLAC TROMETHAMINE 30 MG: 30 INJECTION, SOLUTION INTRAMUSCULAR at 00:10

## 2017-05-27 RX ADMIN — SODIUM CHLORIDE, SODIUM LACTATE, POTASSIUM CHLORIDE, AND CALCIUM CHLORIDE 125 ML/HR: .6; .31; .03; .02 INJECTION, SOLUTION INTRAVENOUS at 00:35

## 2017-05-27 RX ADMIN — DOCUSATE SODIUM 100 MG: 100 CAPSULE, LIQUID FILLED ORAL at 08:45

## 2017-05-27 RX ADMIN — KETOROLAC TROMETHAMINE 30 MG: 30 INJECTION, SOLUTION INTRAMUSCULAR at 19:12

## 2017-05-27 RX ADMIN — OXYCODONE HYDROCHLORIDE AND ACETAMINOPHEN 1 TABLET: 5; 325 TABLET ORAL at 15:48

## 2017-05-27 RX ADMIN — IBUPROFEN 600 MG: 600 TABLET, FILM COATED ORAL at 12:15

## 2017-05-27 RX ADMIN — KETOROLAC TROMETHAMINE 30 MG: 30 INJECTION, SOLUTION INTRAMUSCULAR at 06:05

## 2017-05-27 RX ADMIN — NALBUPHINE HYDROCHLORIDE 5 MG: 10 INJECTION, SOLUTION INTRAMUSCULAR; INTRAVENOUS; SUBCUTANEOUS at 02:29

## 2017-05-28 RX ADMIN — OXYCODONE HYDROCHLORIDE AND ACETAMINOPHEN 1 TABLET: 5; 325 TABLET ORAL at 00:13

## 2017-05-28 RX ADMIN — DOCUSATE SODIUM 100 MG: 100 CAPSULE, LIQUID FILLED ORAL at 17:41

## 2017-05-28 RX ADMIN — KETOROLAC TROMETHAMINE 30 MG: 30 INJECTION, SOLUTION INTRAMUSCULAR at 00:49

## 2017-05-28 RX ADMIN — OXYCODONE HYDROCHLORIDE AND ACETAMINOPHEN 1 TABLET: 5; 325 TABLET ORAL at 07:00

## 2017-05-28 RX ADMIN — OXYCODONE HYDROCHLORIDE AND ACETAMINOPHEN 1 TABLET: 5; 325 TABLET ORAL at 19:49

## 2017-05-28 RX ADMIN — IBUPROFEN 600 MG: 600 TABLET, FILM COATED ORAL at 09:44

## 2017-05-28 RX ADMIN — OXYCODONE HYDROCHLORIDE AND ACETAMINOPHEN 1 TABLET: 5; 325 TABLET ORAL at 15:00

## 2017-05-28 RX ADMIN — DOCUSATE SODIUM 100 MG: 100 CAPSULE, LIQUID FILLED ORAL at 09:44

## 2017-05-28 RX ADMIN — OXYCODONE HYDROCHLORIDE AND ACETAMINOPHEN 1 TABLET: 5; 325 TABLET ORAL at 23:49

## 2017-05-28 RX ADMIN — IBUPROFEN 600 MG: 600 TABLET, FILM COATED ORAL at 17:40

## 2017-05-29 VITALS
DIASTOLIC BLOOD PRESSURE: 71 MMHG | TEMPERATURE: 98.1 F | RESPIRATION RATE: 20 BRPM | SYSTOLIC BLOOD PRESSURE: 137 MMHG | WEIGHT: 179 LBS | OXYGEN SATURATION: 99 % | BODY MASS INDEX: 29.82 KG/M2 | HEART RATE: 92 BPM | HEIGHT: 65 IN

## 2017-05-29 RX ORDER — ACETAMINOPHEN 325 MG/1
650 TABLET ORAL EVERY 6 HOURS PRN
Qty: 30 TABLET | Refills: 0 | Status: SHIPPED | OUTPATIENT
Start: 2017-05-29 | End: 2017-09-16

## 2017-05-29 RX ORDER — IBUPROFEN 600 MG/1
600 TABLET ORAL EVERY 6 HOURS PRN
Qty: 30 TABLET | Refills: 0 | Status: SHIPPED | OUTPATIENT
Start: 2017-05-29 | End: 2017-09-16

## 2017-05-29 RX ORDER — DOCUSATE SODIUM 100 MG/1
100 CAPSULE, LIQUID FILLED ORAL 2 TIMES DAILY PRN
Qty: 10 CAPSULE | Refills: 0 | Status: SHIPPED | OUTPATIENT
Start: 2017-05-29 | End: 2017-09-16

## 2017-05-29 RX ADMIN — IBUPROFEN 600 MG: 600 TABLET, FILM COATED ORAL at 01:57

## 2017-05-29 RX ADMIN — OXYCODONE HYDROCHLORIDE AND ACETAMINOPHEN 2 TABLET: 5; 325 TABLET ORAL at 13:15

## 2017-05-29 RX ADMIN — OXYCODONE HYDROCHLORIDE AND ACETAMINOPHEN 1 TABLET: 5; 325 TABLET ORAL at 04:38

## 2017-05-29 RX ADMIN — IBUPROFEN 600 MG: 600 TABLET, FILM COATED ORAL at 08:49

## 2017-05-29 RX ADMIN — DOCUSATE SODIUM 100 MG: 100 CAPSULE, LIQUID FILLED ORAL at 08:49

## 2017-05-29 RX ADMIN — OXYCODONE HYDROCHLORIDE AND ACETAMINOPHEN 2 TABLET: 5; 325 TABLET ORAL at 08:49

## 2017-06-05 ENCOUNTER — TELEPHONE (OUTPATIENT)
Dept: LABOR AND DELIVERY | Facility: HOSPITAL | Age: 37
End: 2017-06-05

## 2017-06-05 ENCOUNTER — GENERIC CONVERSION - ENCOUNTER (OUTPATIENT)
Dept: OTHER | Facility: OTHER | Age: 37
End: 2017-06-05

## 2017-06-05 LAB — PLACENTA IN STORAGE: NORMAL

## 2017-06-06 ENCOUNTER — TELEPHONE (OUTPATIENT)
Dept: LABOR AND DELIVERY | Facility: HOSPITAL | Age: 37
End: 2017-06-06

## 2017-06-13 ENCOUNTER — GENERIC CONVERSION - ENCOUNTER (OUTPATIENT)
Dept: OTHER | Facility: OTHER | Age: 37
End: 2017-06-13

## 2017-07-03 ENCOUNTER — GENERIC CONVERSION - ENCOUNTER (OUTPATIENT)
Dept: OTHER | Facility: OTHER | Age: 37
End: 2017-07-03

## 2017-09-16 ENCOUNTER — HOSPITAL ENCOUNTER (EMERGENCY)
Facility: HOSPITAL | Age: 37
Discharge: HOME | End: 2017-09-16
Attending: EMERGENCY MEDICINE | Admitting: EMERGENCY MEDICINE
Payer: COMMERCIAL

## 2017-09-16 VITALS
RESPIRATION RATE: 18 BRPM | HEART RATE: 90 BPM | SYSTOLIC BLOOD PRESSURE: 143 MMHG | DIASTOLIC BLOOD PRESSURE: 63 MMHG | TEMPERATURE: 97.9 F | OXYGEN SATURATION: 98 %

## 2017-09-16 DIAGNOSIS — R10.9 ABDOMINAL PAIN: ICD-10-CM

## 2017-09-16 DIAGNOSIS — M54.9 BACK PAIN: ICD-10-CM

## 2017-09-16 DIAGNOSIS — N93.9 VAGINAL BLEEDING: Primary | ICD-10-CM

## 2017-09-16 LAB
ANION GAP SERPL CALCULATED.3IONS-SCNC: 7 MMOL/L (ref 4–13)
BASOPHILS # BLD AUTO: 0.02 THOUSANDS/ΜL (ref 0–0.1)
BASOPHILS NFR BLD AUTO: 0 % (ref 0–1)
BUN SERPL-MCNC: 8 MG/DL (ref 5–25)
CALCIUM SERPL-MCNC: 8.6 MG/DL (ref 8.3–10.1)
CHLORIDE SERPL-SCNC: 105 MMOL/L (ref 100–108)
CO2 SERPL-SCNC: 28 MMOL/L (ref 21–32)
CREAT SERPL-MCNC: 0.58 MG/DL (ref 0.6–1.3)
EOSINOPHIL # BLD AUTO: 0.12 THOUSAND/ΜL (ref 0–0.61)
EOSINOPHIL NFR BLD AUTO: 2 % (ref 0–6)
ERYTHROCYTE [DISTWIDTH] IN BLOOD BY AUTOMATED COUNT: 13.6 % (ref 11.6–15.1)
GFR SERPL CREATININE-BSD FRML MDRD: 120 ML/MIN/1.73SQ M
GLUCOSE SERPL-MCNC: 100 MG/DL (ref 65–140)
HCG SERPL QL: NEGATIVE
HCT VFR BLD AUTO: 38.2 % (ref 34.8–46.1)
HGB BLD-MCNC: 13 G/DL (ref 11.5–15.4)
LYMPHOCYTES # BLD AUTO: 2.46 THOUSANDS/ΜL (ref 0.6–4.47)
LYMPHOCYTES NFR BLD AUTO: 33 % (ref 14–44)
MCH RBC QN AUTO: 27.8 PG (ref 26.8–34.3)
MCHC RBC AUTO-ENTMCNC: 34 G/DL (ref 31.4–37.4)
MCV RBC AUTO: 82 FL (ref 82–98)
MONOCYTES # BLD AUTO: 0.45 THOUSAND/ΜL (ref 0.17–1.22)
MONOCYTES NFR BLD AUTO: 6 % (ref 4–12)
NEUTROPHILS # BLD AUTO: 4.32 THOUSANDS/ΜL (ref 1.85–7.62)
NEUTS SEG NFR BLD AUTO: 59 % (ref 43–75)
NRBC BLD AUTO-RTO: 0 /100 WBCS
PLATELET # BLD AUTO: 254 THOUSANDS/UL (ref 149–390)
PMV BLD AUTO: 10.9 FL (ref 8.9–12.7)
POTASSIUM SERPL-SCNC: 3.6 MMOL/L (ref 3.5–5.3)
RBC # BLD AUTO: 4.67 MILLION/UL (ref 3.81–5.12)
SODIUM SERPL-SCNC: 140 MMOL/L (ref 136–145)
WBC # BLD AUTO: 7.38 THOUSAND/UL (ref 4.31–10.16)

## 2017-09-16 PROCEDURE — 85025 COMPLETE CBC W/AUTO DIFF WBC: CPT | Performed by: EMERGENCY MEDICINE

## 2017-09-16 PROCEDURE — 99284 EMERGENCY DEPT VISIT MOD MDM: CPT

## 2017-09-16 PROCEDURE — 80048 BASIC METABOLIC PNL TOTAL CA: CPT | Performed by: EMERGENCY MEDICINE

## 2017-09-16 PROCEDURE — 96374 THER/PROPH/DIAG INJ IV PUSH: CPT

## 2017-09-16 PROCEDURE — 84703 CHORIONIC GONADOTROPIN ASSAY: CPT | Performed by: EMERGENCY MEDICINE

## 2017-09-16 PROCEDURE — 36415 COLL VENOUS BLD VENIPUNCTURE: CPT | Performed by: EMERGENCY MEDICINE

## 2017-09-16 RX ORDER — KETOROLAC TROMETHAMINE 30 MG/ML
30 INJECTION, SOLUTION INTRAMUSCULAR; INTRAVENOUS ONCE
Status: COMPLETED | OUTPATIENT
Start: 2017-09-16 | End: 2017-09-16

## 2017-09-16 RX ADMIN — KETOROLAC TROMETHAMINE 30 MG: 30 INJECTION, SOLUTION INTRAMUSCULAR at 11:11

## 2017-09-27 ENCOUNTER — GENERIC CONVERSION - ENCOUNTER (OUTPATIENT)
Dept: OTHER | Facility: OTHER | Age: 37
End: 2017-09-27

## 2018-01-09 NOTE — MISCELLANEOUS
Message  Ita Esters is to stop working due to pregnancy effective 5-   Return to work or school:   Kylie Mylesdaron is under my professional care   She was seen in my office on 05/17/2017             Signatures   Electronically signed by : IVAN Toure ; May 18 2017  9:27AM EST                       (Author)

## 2018-01-10 NOTE — MISCELLANEOUS
Message  patient called office, wants to set up appointment for 1st Britni injection 12/30/16 1530 pm after work  I called Ritao with [facility] [phone], shipment expected 12/30/16 , patient informed we cannot guarantee we will get med by 0, patient would like to set up appointment any way and we will call her if not received to reschedule for early next week  Active Problems    1  1St trimester screening (V28 89) (Z36)   2  Allergic rhinitis (477 9) (J30 9)   3  Anterior neck pain (723 1) (M54 2)   4  Establish gestational age, ultrasound (V28 3) (Z36)   5  Fatigue (780 79) (R53 83)   6  History of premature delivery, currently pregnant, first trimester (V23 41) (O09 211)   7  Irregular menstruation (626 4) (N92 6)   8  Need for prophylactic vaccination and inoculation against influenza (V04 81) (Z23)   9  Pregnancy (V22 2) (Z33 1)   10  Thyroid nodule (241 0) (E04 1)   11  Vitamin D deficiency (268 9) (E55 9)   12  Xerosis cutis (706 8) (L85 3)    Current Meds   1  CitraNatal Assure 35-1 & 300 MG Oral Miscellaneous; TAKE AS DIRECTED PER   PACKAGE INSTRUCTIONS; Therapy: 26AIA1881 to (Last Rx:10Oct2016)  Requested for: 11Oct2016 Ordered   2  Sudafed 30 MG Oral Tablet; TAKE 1 TABLET  per patient as needed; Therapy: (Recorded:30Nov2016) to Recorded   3  Tylenol Extra Strength 500 MG Oral Tablet; TAKE TABLET  per pt states taking as needed; Therapy: (Recorded:11Nov2016) to Recorded    Allergies    1  Pertussis Vaccine    2  Seasonal  Denied    3   Shellfish    Signatures   Electronically signed by : Jeanine Charles, ; Dec 29 2016 11:47AM EST                       (Author)

## 2018-01-10 NOTE — PROGRESS NOTES
MAY 23 2017         RE: Juan Wyman Sheets                                  To: [facility]   MR#: [id number]                                     Sussy Moore   : Brenda Mcnamara 10: [id number]                             Fax: [phone]   (Exam #: [id number])      The LMP of this 31year old,  G6, P0-1-4-2 patient was AUG 28 2016, giving   her an CLIFF of 2017 and a current gestational age of 37 weeks 2 days   by dates  A sonographic examination was performed on MAY 23 2017 using   real time equipment  The ultrasound examination was performed using   abdominal technique  The patient has a BMI of 30 0  Her blood pressure   today was 119/65  Earliest ultrasound found in her record: MFM 16  11w4d  CLIFF 17      Cardiac motion was observed at 139 bpm       INDICATIONS      previous  delivery      Exam Types      Amniotic Fluid Index      RESULTS      Fetus # 1 of 1   Vertex presentation   Placenta Location = Posterior   No placenta previa      AMNIOTIC FLUID      Q1: 6 4      Q2: 5 8      Q3: 3 2      Q4: 6 5   JAREK Total = 21 9 cm   Amniotic Fluid: Normal      BIOPHYSICAL PROFILE      The Biophysical Profile score was 10/10  Breathin  Movement: 2  Tone: 2  AFV: 2  NST: 2      IMPRESSION      Estrada IUP   Vertex presentation   Regular fetal heart rate of 139 bpm   Posterior placenta   No placenta previa      GENERAL COMMENT      The patient presented today for fetal surveillance secondary to decreased   fetal movement  She had a reassuring biophysical profile, which includes   an NST and evaluation of the amniotic fluid  The NST was reactive and   reassuring  The amniotic fluid index was 21 9 cm, which is normal for   gestational age  Recommend reevaluation as clinically indicated  Thank very much for allowing us to participate in the care of your   patient    Should you have any questions, please do not hesitate to contact   our office  ADDY Chaves M D     Electronically signed 05/30/17 08:19

## 2018-01-10 NOTE — PROGRESS NOTES
2017         RE: Hannah Deal Sheets                                  To: [facility]   MR#: [id number]                                     5 Rosy Moore   : Reema Barton 90: [id number]                             Fax: [phone]   (Exam #: [id number])      The LMP of this 31year old,  G6, P0-2-4-2 patient was AUG 28 2016, giving   her an CLIFF of 2017 and a current gestational age of 25 weeks 4 days   by dates  A sonographic examination was performed on 2017 using   real time equipment  The ultrasound examination was performed using   abdominal & vaginal techniques  The patient has a BMI of 26 0  Her blood   pressure today was 114/69  Earliest ultrasound found in her record: MFM 16  11w4d  CLIFF 17      With the exception of upper respiratory infection symptoms, Hannah Deal has no   complaints today  She reports fetal movements and denies vaginal bleeding,   abdominal pain or cramping  She is treated with weekly IM progesterone   given her history of a prior spontaneous  birth  Her recently   completed Stepwise Sequential Screen revealed Down syndrome and trisomy 18   risks each of less than 1:5,000, and an ONTD risk of 1:5,000  Evaluation   of the individual analyte levels reveals no increased risk for abnormal   placentation        Cardiac motion was observed at 165 bpm       INDICATIONS      previous  delivery   fetal anatomical survey      Exam Types      LEVEL II   Transvaginal      RESULTS      Fetus # 1 of 1   Transverse presentation   Fetal growth appeared normal   Placenta Location = Posterior   Complete placenta previa   Placenta Grade = I      MEASUREMENTS (* Included In Average GA)      AC              15 2 cm        20 weeks 1 day * (41%)   BPD              4 9 cm        20 weeks 5 days* (52%)   HC              18 7 cm        20 weeks 6 days* (57%)   Femur            3 8 cm        22 weeks 1 day * (76%)      Nuchal Fold      4 8 mm      Humerus          3 5 cm        21 weeks 6 days  (74%)   Foot             3 6 cm        21 weeks 1 day      Cerebellum       2 2 cm        21 weeks 5 days   Biorbit          3 3 cm        21 weeks 1 day   CisternaMagna    5 9 mm      HC/AC           1 23   FL/AC           0 25   FL/BPD          0 77   EFW (Ac/Fl/Hc)   397 grams - 0 lbs 14 oz      THE AVERAGE GESTATIONAL AGE is 21 weeks 0 days +/- 10 days  AMNIOTIC FLUID         Largest Vertical Pocket = 4 0 cm   Amniotic Fluid: Normal      CERVICAL EVALUATION      The cervix appeared normal (Ultrasound Examination)  SUPINE      Cervical Length: 4 00 cm      OTHER TEST RESULTS           Funneling?: No             Dynamic Changes?: No        Resp  To TFP?: No                      Debris?: No      ANATOMY      Head                                    Normal   Face/Neck                               See Details   Th  Cav  Normal   Heart                                   See Details   Abd  Cav  Normal   Stomach                                 Normal   Right Kidney                            Normal   Left Kidney                             Normal   Bladder                                 Normal   Abd  Wall                               Normal   Spine                                   Normal   Extrems                                 See Details   Genitalia                               Normal   Placenta                                Abnormal   Umbl  Cord                              Normal   Uterus                                  Normal      ANATOMY DETAILS      Visualized Appearing Sonographically Normal:   HEAD: (Calvarium, BPD Level, Cavum, Lateral Ventricles, Choroid Plexus,   Cerebellum, Cisterna Magna);    FACE/NECK: (Neck, Nuchal Fold, Orbits,   Nose/Lips, Palate, Face);    TH  CAV  : (Lungs, Diaphragm);     HEART: (No Boundaries [place], Proximal Left Outflow, Proximal Right Outflow, 3VV, 3 Vessel   Trachea, Ductal Arch, Aortic Arch, Interventricular Septum, Interatrial   Septum, IVC, SVC, Cardiac Axis, Cardiac Position);    ABD  CAV : (Liver);      STOMACH, RIGHT KIDNEY, LEFT KIDNEY, BLADDER, ABD  WALL, SPINE: (Cervical   Spine, Thoracic Spine, Lumbar Spine, Sacrum);    EXTREMS: (Lt Humerus, Rt   Humerus, Lt Forearm, Rt Forearm, Lt Hand, Lt Femur, Rt Femur, Lt Low Leg,   Rt Low Leg, Lt Foot, Rt Foot);    GENITALIA (Male), UMBL  CORD, UTERUS      Not Visualized:   FACE/NECK: (Profile); HEART: ([place] of Greater Vessels);      EXTREMS: (Rt Hand)      Abnormal:   PLACENTA      ADNEXA      The left ovary was not visualized  The right ovary was not visualized  IMPRESSION      Estrada IUP   21 weeks and 0 days by this ultrasound  (CLIFF=JUN 1 2017)   Transverse presentation   Fetal growth appeared normal   Regular fetal heart rate of 165 bpm   Posterior placenta   Complete placenta previa      GENERAL COMMENT      No fetal structural abnormality or ultrasound marker for aneuploidy is   identified on the Level II ultrasound study today  The cardiac short axis   view of the great vessels, right hand, placental cord insertion site,   facial profile, and nasal bone are suboptimally imaged secondary to   unfavorable fetal position  Fetal growth and amniotic fluid volume are   normal   Complete posterior placenta previa is present  The placenta is   otherwise normal in appearance  The cervix is normal in appearance by transvaginal sonography  The   cervical length is normal   Cervical debris is not present  Cervical   funneling is not present  Neither provocative nor dynamic change is   appreciated  Martinezjose Wyman  was given an IM injection of 250 mg of 17-OHPC at her visit today  Today's ultrasound findings and suggested follow-up were discussed in   detail with Juan Wyman    We discussed that prenatal ultrasound cannot rule out   all congenital abnormalities  Her Sequential Screen results were discussed   in detail  Jesus Fritz will return to the [address] [place] [address] at 23 weeks gestation   for cervical sonography  Repeat assessment of fetal interval growth,   assessment for placental location, and evaluation of anatomic targets not   imaged well today will be performed at 28 weeks gestation  Continuation   of weekly 17-OHPC is recommended through 36 completed weeks gestation  The face to face time, in addition to time spent discussing ultrasound   results, was approximately 10 minutes, greater than 50% of which was spent   during counseling and coordination of care  [facility] Center[facility] M D     Maternal-Fetal Medicine   Electronically signed 01/19/17 15:16

## 2018-01-11 NOTE — MISCELLANEOUS
Chief Complaint  Chief Complaint Free Text Note Form: TCMXX: Pt was admitted to [facility] from 2017 through 2017  Dx: 38 weeks gestation, , tubal ligation  Called pt X's 2 ( 2017, 2017)  Pt will follow up with her OB in six weeks for post partum check up  History of Present Illness  TCM Communication [facility]: The patient is being contacted for follow-up after hospitalization and 2017, 2017  She was hospitalized at [facility]  The date of admission: 2017, date of discharge: 2017  Diagnosis: 38 weeks gestation, , tubal ligation  She was discharged to home  Medications were not reviewed today  She did not schedule a follow up appointment  Communication performed and completed by Kenny Salcido Problems    1  1St trimester screening (V28 89) (Z36)   2  Abnormal glucose in pregnancy, antepartum (648 83) (O99 810)   3  Allergic rhinitis (477 9) (J30 9)   4   screening encounter (V28 9) (Z36)   5  Anterior neck pain (723 1) (M54 2)   6  Encounter for fetal anatomic survey (V28 81) (Z36)   7  Establish gestational age, ultrasound (V28 3) (Z36)   6  Fatigue (780 79) (R53 83)   9  History of premature delivery, currently pregnant, first trimester (V23 41) (O09 211)   10  History of premature delivery, currently pregnant, second trimester (V23 41) (O09 212)   11  History of  delivery, currently pregnant in third trimester (V23 41) (O09 213)   12  Irregular menstruation (626 4) (N92 6)   13  Need for prophylactic vaccination and inoculation against influenza (V04 81) (Z23)   14  Post-operative pain (338 18) (G89 18)   15  Pregnancy (V22 2) (Z33 1)   16  Sinusitis (473 9) (J32 9)   17  Thyroid nodule (241 0) (E04 1)   18  Upper respiratory infection, acute (465 9) (J06 9)   19  Vitamin D deficiency (268 9) (E55 9)   20  Xerosis cutis (706 8) (L85 3)    Past Medical History    1   History of , threatened, early pregnancy (640 03) (O20 0)   2  History of Dichorionic diamniotic twin pregnancy (651 00,V91 03) (O30 049)   3  History of Encounter for gynecological examination without abnormal finding (V72 31)   (Z01 419)   4  History of Endometriosis (617 9) (N80 9)   5  History of Generalized anxiety disorder (300 02) (F41 1)   6  History of Gestational diabetes mellitus, antepartum (648 83) (O24 419)   7  History of acute conjunctivitis (V12 49) (Z86 69)   8  History of acute sinusitis (V12 69) (Z87 09)   9  History of pregnancy (V13 29)   10  History of premature delivery, currently pregnant, second trimester (V23 41) (O09 212)   11  History of renal calculi (V13 01) (Z87 442)   12  History of Pruritus (698 9) (L29 9)    Surgical History    1  History of  Section Low Transverse   2  History of Laparoscopy (Diagnostic) Gynecologic With Biopsy   3  History of Sinus Surgery    Family History  Mother    1  Family history of Anxiety Disorder NOS   2  Family history of Aortic Valve Disorder   3  Family history of Hyperlipidemia  Father    4  Family history of High cholesterol  Sister    5  Family history of Breast Cancer (V16 3)  Maternal Grandmother    6  Family history of Colon cancer  Paternal Grandmother    9  Family history of Colon cancer  Maternal Grandfather    8  Family history of   Paternal Grandfather    5  Family history of     Social History    · Current Smoker   · Former smoker (D74 76) (L57 842)   · History of Former smoker (N31 28) (S18 423)   · Marital History - Currently     Current Meds   1  Augmentin TABS Recorded   2  CitraNatal Assure 35-1 & 300 MG Oral Miscellaneous; TAKE AS DIRECTED PER   PACKAGE INSTRUCTIONS; Therapy: 13OAQ3703 to (Last Rx:2016)  Requested for: 85XIX1495 Ordered   3  Oxycodone-Acetaminophen 5-325 MG Oral Tablet; TAKE 1 TABLET EVERY 6 HOURS AS   NEEDED FOR PAIN;   Therapy: 13HPJ1618 to (Evaluate:2017); Last Rx:2017 Ordered   4   Sudafed 30 MG Oral Tablet; TAKE 1 TABLET  per patient as needed; Therapy: (Recorded:30Nov2016) to Recorded   5  Tylenol Extra Strength 500 MG Oral Tablet; TAKE TABLET  per pt states taking as needed; Therapy: (Recorded:11Nov2016) to Recorded    Allergies    1  Cefprozil TABS   2  Pertussis Vaccine    3  Seasonal  Denied    4  Shellfish    Future Appointments    Date/Time Provider Specialty Site   06/05/2017 02:15 PM Urvashi Srivasatva DO Obstetrics/Gynecology [facility] OB/[facility]N A [facility]     Signatures   Electronically signed by :  IVAN Ba ; Jun 5 2017 12:31PM EST                       (Author)

## 2018-01-11 NOTE — MISCELLANEOUS
Message  spoke with Elsi Swan from [address] [place] [address] [phone], 4 doses single dose vials 1 ml Essig will be shipped to [facility] office, should receive 17  Per Elsi Swan patient agrees to shipment  Active Problems    1  1st trimester screening (V28 89) (Z36)   2  Abnormal glucose in pregnancy, antepartum (648 83) (O99 810)   3  Allergic rhinitis (477 9) (J30 9)   4   screening encounter (V28 9) (Z36)   5  Anterior neck pain (723 1) (M54 2)   6  Encounter for fetal anatomic survey (V28 81) (Z36)   7  Establish gestational age, ultrasound (V28 3) (Z36)   6  Fatigue (780 79) (R53 83)   9  History of premature delivery, currently pregnant, first trimester (V23 41) (O09 211)   10  History of premature delivery, currently pregnant, second trimester (V23 41) (O09 212)   11  Irregular menstruation (626 4) (N92 6)   12  Need for prophylactic vaccination and inoculation against influenza (V04 81) (Z23)   13  Pregnancy (V22 2) (Z33 1)   14  Thyroid nodule (241 0) (E04 1)   15  Upper respiratory infection, acute (465 9) (J06 9)   16  Vitamin D deficiency (268 9) (E55 9)   17  Xerosis cutis (706 8) (L85 3)    Current Meds   1  CitraNatal Assure 35-1 & 300 MG Oral Miscellaneous; TAKE AS DIRECTED PER   PACKAGE INSTRUCTIONS; Therapy: 90PDR7261 to (Last Rx:2016)  Requested for: 2016 Ordered   2  Britni 250 MG/ML Intramuscular Oil (Hydroxyprogesterone Caproate); INJECT 250    MG Intramuscular 250mg/ml  weekly until 36 weeks gestation; Therapy: 65ZVJ4000 to (Last Rx:10Fiu6478)  Requested for: 2016 Ordered   3  ProAir RespiClick 798 (90 Base) MCG/ACT Inhalation Aerosol Powder Breath Activated;   INHALE 2 PUFFS Every 4 hours PRN Cough/wheeze; Therapy: 83ADI7562 to (Last Rx:2017)  Requested for: 2017 Ordered   4  Safe Tussin DM LIQD;   Therapy: (Recorded:2017) to Recorded   5  Sudafed 30 MG Oral Tablet; TAKE 1 TABLET  per patient as needed; Therapy: (Recorded:2016) to Recorded   6  Tylenol Extra Strength 500 MG Oral Tablet; TAKE TABLET  per pt states taking as needed; Therapy: (Recorded:11Nov2016) to Recorded    Allergies    1  Pertussis Vaccine    2  Seasonal  Denied    3   Shellfish    Signatures   Electronically signed by : Ledy Arriaga, ; Mar 13 2017  5:36PM EST                       (Author)

## 2018-01-11 NOTE — PROGRESS NOTES
Chief Complaint  Patient is here to receive the influenza vaccination  Active Problems    1  , threatened, early pregnancy (640 03) (O20 0)   2  Acute conjunctivitis (372 00) (H10 30)   3  Acute sinusitis (461 9) (J01 90)   4  Allergic rhinitis (477 9) (J30 9)   5  Anterior neck pain (723 1) (M54 2)   6  Dichorionic diamniotic twin pregnancy (651 00,V91 03) (O30 049)   7  Encounter for gynecological examination without abnormal finding (V72 31) (Z01 419)   8  Fatigue (780 79) (R53 83)   9  Gestational diabetes mellitus, antepartum (648 83) (O24 419)   10  Irregular menstruation (626 4) (N92 6)   11  Need for prophylactic vaccination and inoculation against influenza (V04 81) (Z23)   12  Pruritus (698 9) (L29 9)   13  Sinusitis (473 9) (J32 9)   14  Thyroid nodule (241 0) (E04 1)   15  Vitamin D deficiency (268 9) (E55 9)   16  Xerosis cutis (706 8) (L85 3)    Current Meds   1  Fluticasone Propionate 50 MCG/ACT Nasal Suspension; instill 2 sprays into each nostril   once daily; Therapy: 84YPB6611 to ([facility]:25TNY7535)  Requested for: 2016; Last   Rx:2016 Ordered   2  Levofloxacin 500 MG Oral Tablet; take 1 tablet by mouth daily; Therapy: 68LUH2043 to (Last Rx:51Dbl3255)  Requested for: 10Iws8337 Ordered   3  Sudafed TABS; TAKE TABLET  per pt takes prn; Therapy: (Recorded:00Hme0824) to Recorded   4  ZyrTEC Allergy 10 MG Oral Tablet; TAKE 1 TABLET DAILY AS DIRECTED; Therapy: (Recorded:18Tbw1788) to Recorded    Allergies    1  Seasonal   2  Shellfish    Vitals  Signs    Temperature: 98 6 F    Plan  Need for prophylactic vaccination and inoculation against influenza    · Fluzone Quadrivalent 0 5 ML Intramuscular Suspension    Future Appointments    Date/Time Provider Specialty Site   10/10/2016 01:30 PM OB/GYN [facility], Nurse Schedule  [facility]   2016 03:15 PM IVAN Ambrose   Obstetrics/Gynecology [facility] OB/GYN A [facility]     Signatures Electronically signed by :  IVAN Mccracken ; Oct  8 2016 12:02PM EST                       (Author)

## 2018-01-12 VITALS
BODY MASS INDEX: 29.89 KG/M2 | RESPIRATION RATE: 14 BRPM | HEART RATE: 100 BPM | TEMPERATURE: 97.8 F | DIASTOLIC BLOOD PRESSURE: 80 MMHG | HEIGHT: 65 IN | WEIGHT: 179.38 LBS | SYSTOLIC BLOOD PRESSURE: 122 MMHG

## 2018-01-12 VITALS
SYSTOLIC BLOOD PRESSURE: 131 MMHG | WEIGHT: 166 LBS | DIASTOLIC BLOOD PRESSURE: 67 MMHG | BODY MASS INDEX: 27.66 KG/M2 | HEIGHT: 65 IN

## 2018-01-12 VITALS — DIASTOLIC BLOOD PRESSURE: 65 MMHG | HEIGHT: 65 IN | SYSTOLIC BLOOD PRESSURE: 119 MMHG

## 2018-01-12 NOTE — PROGRESS NOTES
2017         RE: Jacquie Chun Sheets                                  To: [facility]   MR#: [id number]                                     5 Rosy Moore   : [address]: [id number]                             Fax: [phone]   (Exam #: [id number])      The LMP of this 31year old,  G6, P0-1-4-2 patient was AUG 28 2016, giving   her an CLIFF of 2017 and a current gestational age of 20 weeks 4 days   by dates  A sonographic examination was performed on 2017 using real   time equipment  The ultrasound examination was performed using abdominal &   vaginal techniques  The patient has a BMI of 27 5  Her initial blood   pressure today was 161/66, and it was later recorded at 142/68  Earliest ultrasound found in her record: MFM 16  11w4d  CLIFF 17      Cardiac motion was observed at 144 bpm       INDICATIONS      previous  delivery   placenta previa-complete   Evaluate missed anatomy      Exam Types      Transvaginal   Level I      RESULTS      Fetus # 1 of 1   Breech presentation   Placenta Location = Left lateral   No placenta previa   Placenta Grade = I      MEASUREMENTS (* Included In Average GA)      NBL              7 9 mm      AMNIOTIC FLUID         Largest Vertical Pocket = 3 6 cm   Amniotic Fluid: Normal      CERVICAL EVALUATION      SUPINE      Cervical Length: 4 10 cm      OTHER TEST RESULTS           Funneling?: No             Dynamic Changes?: No        Resp  To TFP?: No      ANATOMY DETAILS      Visualized Appearing Sonographically Normal:   FACE/NECK: (Profile);     HEART: (Four Chamber View, Proximal Left Outflow,   Proximal Right Outflow, Short Axis of Greater Vessels, Cardiac Axis,   Cardiac Position);    STOMACH, RIGHT KIDNEY, LEFT KIDNEY, BLADDER,   EXTREMS: (Lt Forearm, Rt Forearm, Lt Hand, Rt Hand);    GENITALIA (Male),   PLACENTA, PCI      ANATOMY COMMENTS      The prior fetal anatomic survey was limited the area of the profile,shrt   axis,right hand,pci,and nasal bone  These anatomic views were seen today   as sonographically normal within the inherent limitations of fetal   ultrasound  ADNEXA      The left ovary appeared normal and measured 2 2 x 1 7 x 1 6 cm with a   volume of 3 1 cc  The right ovary appeared normal and measured 1 9 x 1 9 x   0 8 cm with a volume of 1 5 cc  IMPRESSION      Estrada IUP   Breech presentation   Regular fetal heart rate of 144 bpm   Left lateral placenta   No placenta previa      GENERAL COMMENT      On exam today the patient appears well, in no acute distress, and denies   any complaints  Her abdomen is non-tender  She was evaluated on labor   and delivery earlier this week with an episode of spotting a few days   after intercourse  That spotting has since resolved  She states that she   was lifting her child and "overdoing it"  The fetal anatomic survey is now complete  There is no sonographic   evidence of fetal abnormalities at this time  The remainder of the survey   was completed previously  Good fetal movement and tone are seen  The   amniotic fluid volume appears normal   The placenta is posterior and it   appears sonographically normal   There is no longer evidence of a placenta   previa as the placenta is greater than 5cm from the internal os  The   patient was informed of today's findings and all of her questions were   answered  The limitations of ultrasound were reviewed with the patient,   which she accepts  Precautions and fetal kick counts were reviewed  Malou's blood pressure is elevated today  She states that it has   occasionally been elevated in the office  I recommend that she be   evaluated for hypertension with baseline preeclampsia labs  Be highly   unusual in a pre-eclampsia at this time however not unheard of  She has   no symptoms suggestive of preeclampsia    I did recommend that she return   in approximate 6 weeks for a fetal growth evaluation and be aware of   symptoms suggestive of preeclampsia  Please note, in addition to the time spent discussing the results of the   ultrasound, approximately [default value] minutes were spent in   consultation with the patient and coordination of care, with greater than   50% of the time spent in direct face-to-face counseling  Thank you very much for allowing us to participate in the care of this   very nice patient  Should you have any questions, please do not hesitate   to contact our office  ADDY Doran M D     Electronically signed 02/09/17 16:29

## 2018-01-12 NOTE — PROGRESS NOTES
MAY 11 2017         RE: Ace Beny Sheets                                  To: [facility]   MR#: [id number]                                     Sussy connie Eastman [facility]: [facility]: [id number]                             Fax: [phone]   (Exam #: [id number])      The LMP of this 31year old,  G6, P0-1-4-2 patient was AUG 28 2016, giving   her an CLIFF of 2017 and a current gestational age of 42 weeks 4 days   by dates  A sonographic examination was performed on MAY 11 2017 using   real time equipment  The ultrasound examination was performed using   abdominal technique  The patient has a BMI of 29 8  Her blood pressure   today was 112/70  Earliest ultrasound found in her record: MFM 16  11w4d  CLIFF 17      Cardiac motion was observed at 150 bpm       INDICATIONS      previous  delivery      Exam Types      Level I      RESULTS      Fetus # 1 of 1   Vertex presentation   Fetal growth appeared normal   Placenta Location = Posterior, left lateral   No placenta previa   Placenta Grade = II      MEASUREMENTS (* Included In Average GA)      AC              33 3 cm        37 weeks 3 days* (66%)   BPD              9 3 cm        37 weeks 5 days* (73%)   HC              33 4 cm        37 weeks 4 days* (57%)   Femur            7 2 cm        36 weeks 2 days* (55%)      Humerus          6 5 cm        37 weeks 4 days  (79%)      Cerebellum       4 9 cm        36 weeks 3 days      HC/AC           1 00   FL/AC           0 22   FL/BPD          0 78   EFW (Ac/Fl/Hc)  3145 grams - 6 lbs 15 oz                 (60%)      THE AVERAGE GESTATIONAL AGE is 37 weeks 2 days +/- 21 days        AMNIOTIC FLUID      Q1: 2 6      Q2: 6 4      Q3: 3 3      Q4: 3 5   JAREK Total = 15 8 cm   Amniotic Fluid: Normal      ANATOMY DETAILS      Visualized Appearing Sonographically Normal:   HEAD: (Calvarium, BPD Level);    FACE/NECK: (Nose/Lips);    TH  CAV : (Diaphragm); HEART: (Four Chamber View);    STOMACH, RIGHT KIDNEY, LEFT   KIDNEY, BLADDER, PLACENTA      Suboptimally Visualized:   HEAD: (Cerebellum)      Not Visualized:   HEAD: (Cavum, Lateral Ventricles)      IMPRESSION      Estrada IUP   37 weeks and 2 days by this ultrasound  (CLIFF=MAY 30 2017)   Vertex presentation   Fetal growth appeared normal   Regular fetal heart rate of 150 bpm   Posterior, left lateral placenta   No placenta previa      GENERAL COMMENT      No fetal structural abnormality is identified on the Level I survey today   in a limited study secondary to the late gestational age  Fetal interval   growth and amniotic fluid volume are normal       Mateus Patrick was given an IM injection of 250 mg of 17-OHPC at her visit today  RECOMMENDATION      Growth Ultrasound: As indicated      ADDY Chaves M D     Maternal-Fetal Medicine   Electronically signed 05/11/17 09:27

## 2018-01-12 NOTE — MISCELLANEOUS
Message  Left message on pt cell phone Britni received in office and to keep appt for injection today      Active Problems    1  1St trimester screening (V28 89) (Z36)   2  Allergic rhinitis (477 9) (J30 9)   3  Anterior neck pain (723 1) (M54 2)   4  Establish gestational age, ultrasound (V28 3) (Z36)   5  Fatigue (780 79) (R53 83)   6  History of premature delivery, currently pregnant, first trimester (V23 41) (O09 211)   7  Irregular menstruation (626 4) (N92 6)   8  Need for prophylactic vaccination and inoculation against influenza (V04 81) (Z23)   9  Pregnancy (V22 2) (Z33 1)   10  Thyroid nodule (241 0) (E04 1)   11  Vitamin D deficiency (268 9) (E55 9)   12  Xerosis cutis (706 8) (L85 3)    Current Meds   1  CitraNatal Assure 35-1 & 300 MG Oral Miscellaneous; TAKE AS DIRECTED PER   PACKAGE INSTRUCTIONS; Therapy: [id number] to (Last Rx:10Oct2016)  Requested for: 11Oct2016 Ordered   2  Sudafed 30 MG Oral Tablet; TAKE 1 TABLET  per patient as needed; Therapy: (Recorded:30Nov2016) to Recorded   3  Tylenol Extra Strength 500 MG Oral Tablet; TAKE TABLET  per pt states taking as needed; Therapy: (Recorded:11Nov2016) to Recorded    Allergies    1  Pertussis Vaccine    2  Seasonal  Denied    3   Shellfish    Signatures   Electronically signed by : Erin York RN; Dec 30 2016 10:08AM EST                       (Author)

## 2018-01-12 NOTE — RESULT NOTES
Message   Please call patient  I received results of her thyroid ultrasound  It reveals small thyroid nodule on the right  This nodule does not require any attention at present time but we do need to repeat thyroid ultrasound in 6 months for recheck  I will print thyroid ultrasound slip for September  Thank you     Verified Results  US THYROID [id number] 07:20AM Patricia Parr Order Number: [id number]     Test Name Result Flag Reference   US THYROID (Report)     THYROID ULTRASOUND     INDICATION: Left-sided neck pain with fatigue and increased thyroid levels  COMPARISON: None  TECHNIQUE:  Ultrasound of the thyroid was performed with a high frequency linear transducer in transverse and sagittal planes including volumetric imaging sweeps as well as traditional still imaging technique  FINDINGS:   Normal homogeneous smooth echotexture  Right gland: 5 5 x 1 3 cm  Hypoechoic nodule in the mid lobe measuring 6 x 3 mm  Left gland: 5 9 x 1 4 cm  No dominant nodules  Isthmus: 0 3 cm in AP dimension  No dominant nodules  IMPRESSION:      Small hypoechoic nodule in the right mid lobe measuring 6 x 3 mm  Workstation performed: [id number]     Signed by: Virginia Tripathi MD   3/20/16       Signatures   Electronically signed by :  IVAN Bobby ; Mar 24 2016  1:13PM EST                       (Author)

## 2018-01-12 NOTE — MISCELLANEOUS
Message  message left on cell number with results of stepwise part 2 within normal limits  to call back with questions      Active Problems    1  1St trimester screening (V28 89) (Z36)   2  Allergic rhinitis (477 9) (J30 9)   3  Anterior neck pain (723 1) (M54 2)   4  Establish gestational age, ultrasound (V28 3) (Z36)   5  Fatigue (780 79) (R53 83)   6  History of premature delivery, currently pregnant, first trimester (V23 41) (O09 211)   7  Irregular menstruation (626 4) (N92 6)   8  Need for prophylactic vaccination and inoculation against influenza (V04 81) (Z23)   9  Pregnancy (V22 2) (Z33 1)   10  Thyroid nodule (241 0) (E04 1)   11  Vitamin D deficiency (268 9) (E55 9)   12  Xerosis cutis (706 8) (L85 3)    Current Meds   1  CitraNatal Assure 35-1 & 300 MG Oral Miscellaneous; TAKE AS DIRECTED PER   PACKAGE INSTRUCTIONS; Therapy: [id number] to (Last Rx:10Oct2016)  Requested for: 11Oct2016 Ordered   2  Sudafed 30 MG Oral Tablet; TAKE 1 TABLET  per patient as needed; Therapy: (Recorded:30Nov2016) to Recorded   3  Tylenol Extra Strength 500 MG Oral Tablet; TAKE TABLET  per pt states taking as needed; Therapy: (Recorded:11Nov2016) to Recorded    Allergies    1  Pertussis Vaccine    2  Seasonal  Denied    3   Shellfish    Signatures   Electronically signed by : Shawn Kinsey, ; Dec 28 2016  2:02PM EST                       (Author)

## 2018-01-13 VITALS
WEIGHT: 169.2 LBS | BODY MASS INDEX: 28.19 KG/M2 | HEIGHT: 65 IN | DIASTOLIC BLOOD PRESSURE: 78 MMHG | SYSTOLIC BLOOD PRESSURE: 134 MMHG

## 2018-01-13 VITALS
BODY MASS INDEX: 30.01 KG/M2 | WEIGHT: 180.13 LBS | HEIGHT: 65 IN | DIASTOLIC BLOOD PRESSURE: 80 MMHG | SYSTOLIC BLOOD PRESSURE: 120 MMHG

## 2018-01-13 VITALS
HEIGHT: 65 IN | DIASTOLIC BLOOD PRESSURE: 64 MMHG | SYSTOLIC BLOOD PRESSURE: 110 MMHG | WEIGHT: 163 LBS | BODY MASS INDEX: 27.16 KG/M2

## 2018-01-13 VITALS
BODY MASS INDEX: 25.99 KG/M2 | WEIGHT: 156 LBS | SYSTOLIC BLOOD PRESSURE: 114 MMHG | HEIGHT: 65 IN | DIASTOLIC BLOOD PRESSURE: 69 MMHG

## 2018-01-13 VITALS
BODY MASS INDEX: 28.89 KG/M2 | DIASTOLIC BLOOD PRESSURE: 70 MMHG | HEIGHT: 65 IN | SYSTOLIC BLOOD PRESSURE: 138 MMHG | WEIGHT: 173.4 LBS

## 2018-01-13 NOTE — PROGRESS NOTES
MAR 23 2017         RE: Erika Izquierdo Sheets                                  To: [facility]   MR#: [id number]                                     5 Rosy Moore   :  [facility],, [facility] [address]: [id number]                             Fax: [phone]   (Exam #: [id number])      The LMP of this 31year old,  G6, P0-1-4-2 patient was AUG 28 2016, giving   her an CLIFF of 2017 and a current gestational age of 33 weeks 4 days   by dates  A sonographic examination was performed on MAR 23 2017 using   real time equipment  The ultrasound examination was performed using   abdominal technique  The patient has a BMI of 28 8  Her blood pressure   today was 138/70  Earliest ultrasound found in her record: MFM 16  11w4d  CLIFF 17      Erika Izquierdo reports occasional lower abdominal cramping but denies problems   related to vaginal bleeding,  labor, hypertension, or gestational   diabetes  She remains treated with weekly IM progesterone given her   history of a prior spontaneous  birth  Screening for gestational   diabetes in  revealed an elevated one-hour post Glucola value   of 145 mg/dL  A diagnostic three-hour glucose tolerance test performed on    revealed 1 out of 4 abnormal values, not consistent with a   diagnosis of gestational diabetes  She is scheduled for delivery by   repeat  section on May 30        Cardiac motion was observed at 162 bpm       INDICATIONS      previous  delivery      Exam Types      Level I      RESULTS      Fetus # 1 of 1   Breech presentation   Fetal growth appeared normal   Placenta Location = Anterior, fundal   No placenta previa   Placenta Grade = I      MEASUREMENTS (* Included In Average GA)      AC              27 6 cm        31 weeks 5 days* (81%)   BPD              7 8 cm        31 weeks 3 days* (76%)   HC              29 0 cm        31 weeks 4 days* (72%)   Femur 5 9 cm        30 weeks 5 days* (64%)      Cerebellum       3 9 cm        32 weeks 6 days      HC/AC           1 05   FL/AC           0 21   FL/BPD          0 75   EFW (Ac/Fl/Hc)  1762 grams - 3 lbs 14 oz                 (73%)      THE AVERAGE GESTATIONAL AGE is 31 weeks 3 days +/- 18 days  AMNIOTIC FLUID      Q1: 2 5      Q2: 4 5      Q3: 1 9      Q4: 2 8   JAREK Total = 11 6 cm   Amniotic Fluid: Normal      ANATOMY DETAILS      Visualized Appearing Sonographically Normal:   HEAD: (Calvarium, BPD Level, Cavum, Lateral Ventricles, Choroid Plexus,   Cerebellum, Cisterna Magna);    FACE/NECK: (Nose/Lips);    TH  CAV :   (Diaphragm); HEART: (Cardiac Axis, Cardiac Position);    STOMACH, RIGHT   KIDNEY, LEFT KIDNEY, BLADDER, PLACENTA      Not Visualized:   HEART: (Four Chamber View, Proximal Left Outflow, Proximal Right Outflow)      IMPRESSION      Estrada IUP   31 weeks and 3 days by this ultrasound  (CLIFF=MAY 22 2017)   Breech presentation   Fetal growth appeared normal   Regular fetal heart rate of 162 bpm   Anterior, fundal placenta   No placenta previa      GENERAL COMMENT      No fetal structural abnormality is identified on the Level I survey today  Fetal cardiac anatomy is suboptimally imaged secondary to unfavorable  Fetal interval growth and amniotic fluid volume are normal       Mateus Patrick was given an IM injection of 250 mg of 17-OHPC at her visit today  Today's ultrasound findings and suggested follow-up were discussed in   detail with Mateus Patrick  She will return to the [facility]  at 36 weeks   gestation to assess fetal interval growth  Daily third trimester fetal   kick counting was discussed at the visit today  Continuation of weekly   17-OHPC is recommended through 36 completed weeks gestation  The face to face time, in addition to time spent discussing ultrasound   results, was approximately 10 minutes, greater than 50% of which was spent   during counseling and coordination of care  ADDY Haji M D     Maternal-Fetal Medicine   Electronically signed 03/23/17 18:26

## 2018-01-13 NOTE — PROGRESS NOTES
2016         RE: Cari Morgan Sheets                                  To: [facility]   MR#: [id number]                                     5 Rosy Moore   : [address]: [id number]                             Fax: [phone]   (Exam #: [id number])      The LMP of this 31year old,  G6, P0-2-4-2 patient was AUG 28 2016, giving   her an CLIFF of 2017 and a current gestational age of 17 weeks 3 days   by dates  A sonographic examination was performed on 2016 using   real time equipment  The ultrasound examination was performed using   abdominal technique  The patient has a BMI of 26 0  Her blood pressure   today was 127/67  Earliest ultrasound found in her record: MFM 16  11w4d  CLIFF 17      Cari Morgan reports occasional mild lower abdominal discomfort and back pain  She denies vaginal bleeding  Otherwise, her prenatal course has been   unremarkable in the interim since her initial MFM evaluation earlier in   November  Multiple longitudinal and transverse sections revealed a farris   intrauterine pregnancy  The placenta is posterior in implantation  Cardiac motion was observed at 166 bpm       INDICATIONS      first trimester genetic screening   previous  delivery      Exam Types      Stepwise Sequential Screen      RESULTS      Fetus # 1 of 1   Fetal growth appeared normal      MEASUREMENTS (* Included In Average GA)      CRL              8 4 cm        13 weeks 6 days*   Nuchal Trans    1 80 mm      THE AVERAGE GESTATIONAL AGE is 13 weeks 6 days +/- 7 days  ANATOMY COMMENTS      Anatomic detail is limited at this gestational age  The yolk sac was not   noted  The fetal cranium appeared normal in shape and the nuchal   translucency was normal in size at 1 8 mm  The nasal bone appears to be   present  The intracranial anatomy was unremarkable    Evaluation of the   spine revealed no obvious evidence for a neural tube defect  Anatomy of   the fetal thorax appeared within normal limits  The four-chamber and 3   vessel tracheal views appear normal   The cardiac rhythm was regular  Within the abdomen, stomach & bladder were visualized and the abdominal   wall appeared intact  A three vessel cord appears to be present  Active   movement of the fetal body & extremities was seen  There is no suspicion   of a subchorionic bleed  The placental cord insertion was normal    There   is no suspicion of a uterine myoma  Free fluid is not seen in the   posterior cul-de-sac  ADNEXA      The left ovary appeared normal and measured 1 9 x 1 8 x 1 9 cm with a   volume of 3 4 cc  The right ovary appeared normal and measured 1 9 x 1 9 x   1 8 cm with a volume of 3 4 cc  AMNIOTIC FLUID         Largest Vertical Pocket = 3 0 cm   Amniotic Fluid: Normal      IMPRESSION      Estrada IUP   13 weeks and 6 days by this ultrasound  (CLIFF=2017)   Fetal growth appeared normal   Regular fetal heart rate of 166 bpm   Posterior placenta      GENERAL COMMENT      Today's ultrasound findings and suggested follow up were discussed in   detail  The Stepwise Sequential Screen was discussed in detail, including   the sensitivity for detection of Down syndrome  We discussed that   definitive prenatal diagnosis is possible only through genetic   amniocentesis or chorionic villus sampling  Arleth Wang was given a requisition   for [facility] for a venous blood sample to complete the first   trimester component of the Stepwise Sequential Screen  The second   trimester component should be obtained between 16 and 18 weeks gestation  Level II ultrasound evaluation will be performed at 20 weeks gestation  Progesterone supplementation clearly appears to reduce the recurrence rate   of spontaneous  birth in women who have had a previous SPTB in a   estrada pregnancy   A prior  birth is a risk factor for subsequent    birth whether the initial  delivery was a farris or a   twin pregnancy  As I discussed with Chari Karimi, there has been no study that   specifically evaluated whether progesterone supplementation decreases the   risk of a  birth of a farris birth after a previous    birth of twins, but it is reasonable to consider both the options of   weekly IM progesterone supplementation, or not providing progesterone   supplementation, in such a situation  Chari Karimi appears to favor treatment   with weekly IM progesterone  We will help to facilitate acquiring the   product to begin administration at about 16 weeks gestation  The face to face time, in addition to time spent discussing ultrasound   results, was approximately 10 minutes, greater than 50% of which was spent   during counseling and coordination of care  ADDY Chow M D     Maternal-Fetal Medicine   Electronically signed 16 10:59

## 2018-01-13 NOTE — RESULT NOTES
Verified Results  * CT SOFT TISSUE NECK W CONTRAST [id number] 09:48AM Allen Portillo     Test Name Result Flag Reference   CT SOFT TISSUE NECK W CONTRAST (Report)     CT NECK WITH CONTRAST     INDICATION: Right thyroid nodule, left anterior neck numbness     COMPARISON: Thyroid ultrasound 3/19/2016     TECHNIQUE: Contiguous 2 5 mm images were obtained through the neck after administration of intravenous contrast  In addition, sagittal and coronal reformatted images were submitted for interpretation  This examination, like all CT scans performed in    the [facility], was performed utilizing techniques to minimize radiation dose exposure, including the use of iterative reconstruction and automated exposure control  80 ml if iodinated contrast, Omnipaque 350, was injected    intravenously without immediate consequence  IMAGE QUALITY: Diagnostic  FINDINGS:     VISUALIZED BRAIN PARENCHYMA: No acute disease  The 11-12 mm pineal region cyst, partially calcified along the periphery, similar to CT of the sinuses 12/31/2006  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      VISUALIZED ORBITS AND PARANASAL SINUSES: Normal      NASAL CAVITY AND NASOPHARYNX: Normal      SUPRAHYOID NECK: Normal oral cavity, tongue base, tonsillar fossa and epiglottis  Prevertebral soft tissues are normal         INFRAHYOID NECK: Aryepiglottic folds, laryngeal tissues, and piriform sinuses are normal         THYROID GLAND: Tiny sonolucent cyst along the anterior inferior aspect of the right lobe of the thyroid  PAROTID AND SUBMANDIBULAR GLANDS: Normal      LYMPH NODES: No pathologic or enlarged adenopathy  Minimal reactive adenopathy, level 1A on the right and bilateral level 2 chains  VASCULAR STRUCTURES: Normal enhancement of the cervical vasculature  THORACIC INLET: Very minimal apical pleural thickening  BONY STRUCTURES: Normal        IMPRESSION:     Normal enhanced CT of the neck   No explanation for left neck numbness  Incidental findings included above  Workstation performed: [id number]     Signed by: Chary Kerr MD   5/9/16       Discussion/Summary   Normal CT neck  We will obtain results of CT sinuses that was done in 2006, neither patient nor I remember who was the ordering physician  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

## 2018-01-13 NOTE — RESULT NOTES
Verified Results  (Q) STEPWISE, PART 1 [id number] 07:17AM Mony Jaquez   REPORT COMMENT:  FASTING:NO     Test Name Result Flag Reference   INTERPRETATION SEE NOTE     This patient's risk does not exceed the first trimester  cut-off for Down syndrome or trisomy 18  The integrated  screen calculation is awaiting the second trimester sample  NT WAS USED IN THE RISK CALCULATIONS  Thank you for submitting this patient's Part 1 specimen  These first trimester values will be incorporated with the  second trimester values as part of the integrated testing  process  Please submit the Part 2 specimen between   12/09/2016-02/02/2017 (15 0 and 22 9 weeks gestation) with   12/09/2016-12/22/2016 (15 0 - 16 9 weeks gestation) being  optimal  When submitting Part 2, please include the  following Specimen # from Part 1:  [id number]   AGE RISK DOWN SYNDROME 1:240     JOSEMANUEL DOWN SYNDROME RISK <1:5000  <1:50   RISK FOR TRISOMY 18 <1:5000  <1:100   CALCULATED GESTATIONAL$AGE 13 9     Crown rump length (CRL) was used to calculate gestational  age  CLIFF, if provided, was not used for gestational age  dating  DEBORA-A 2444 9 ng/mL     This test was performed using a kit that has not been  cleared or approved by the FDA  The analytical performance  characteristics of this test have been determined by [facility]  This test  should not be used for diagnosis without confirmation by  other medically established means     TWIN B CRL NOT GIVEN mm     TWIN B NT NOT GIVEN mm     Twin B Nasal Bone NOT GIVEN     NTQR READING PHYS ID# [id number]     FMF ULTRASONOGRAPHER ID# NOT GIVEN     CROWN RUMP LENGTH 84 mm     NUCHAL TRANSLUCENCY 1 8 mm     Nasal Bone PRESENT     IF TWINS NOT GIVEN     DONOR EGG NO     DONOR EGG; EGG RETRIEVAL NOT GIVEN     ULTRASOUND DATE NOT GIVEN     ULTRASONOGRAPHER'S NAME YOGI CHANEL     NTQR ULTRASONOGRAPHER ID# [id number]     NTQR LOCATION ID# NOT GIVEN     MOTHER'S ETHNIC ORIGIN      NUMBER OF FETUSES 1     INSULIN DEPEND DIABETIC NO     REPEAT SPECIMEN NO     HX OF NEURAL TUBE DEFECTS NO     PREV PREG DOWN SYND NO     REFERRING PHYSICIAN NPI [id number]     DATE OF BIRTH 1980     COLLECTION DATE 12/01/2016     MATERNAL WEIGHT 156 lbs     EST'D DATE OF DELIVERY 06/04/2017     CLIFF DETERMINED BY LMP     DEBORA-A MOM 1 92     HCG 48 3 IU/mL     HCG MOM 0 68     NT MOM 0 98     The maternal serum screening results indicate a lower risk  of trisomy 21 in this pregnancy  The nasal bone was assessed  via ultrasound and was present  The combined risk is  therefore likely to be less than the calculated risk  Other  findings later in the pregnancy may change the risk  Nasal bone assessment is best accomplished through a fetal  ultrasound performed between 11 weeks 0 days through 13  weeks 6 days  In assessing the risk for aneuploidy, the  evaluation of the maternal serum markers plus the nuchal  thickness measurement is calculated first  Any potential  change to the patient's risk for aneuploidy depends on the  nuchal thickness, crown-rump length, and the ethnic origin,  and therefore the values generated by the algorithm itself  will not change  Additional information about the assessment  of the fetal nasal bone may be found on the [place] [address] website at  http://www  fetalmedicine com/fmf/training-certification/cert  xfftjgic-au-dmesk  tence/11-13-week-scan/assessment-of-the-nasal-bone/  Please note that the Sequential Integrated maternal serum  screen for Down syndrome risk assessment was designed by Dr Leroy Sahu ([address], et al J Med Screen 2003 v10 p56-104)  to provide a high detection rate and low false positive rate  when a cutoff of 1:50 is used to identify high risk  pregnancies during the first trimester  Use of any other  cutoff for determination of risk in the first trimester will  result in a higher false positive rate for the two-part  screen    All patients whose risk is lower than 1:50 should  have a second sample submitted to complete the screen  This is a screening test, not a diagnostic test      This risk assessment is based on demographic data provided  by the ordering physician  Please notify the laboratory  promptly if any data are incorrect  If you have questions concerning this report: For clinical consultation, call [phone]; For technical questions, call [phone] ext [phone]; For recalculations, fax to [phone]  For additional information, please refer to  http://[email or web address]/FAQ85  (This link is provided for informational/educational  purposes only )   REFERRING PHYSICIAN NAME Silvia Oneil PHYSICIAN PHONE [phone]

## 2018-01-14 VITALS
BODY MASS INDEX: 27.49 KG/M2 | WEIGHT: 165 LBS | HEIGHT: 65 IN | SYSTOLIC BLOOD PRESSURE: 142 MMHG | DIASTOLIC BLOOD PRESSURE: 68 MMHG

## 2018-01-14 VITALS
WEIGHT: 162.19 LBS | HEART RATE: 76 BPM | RESPIRATION RATE: 14 BRPM | SYSTOLIC BLOOD PRESSURE: 124 MMHG | TEMPERATURE: 98.4 F | HEIGHT: 65 IN | OXYGEN SATURATION: 96 % | BODY MASS INDEX: 27.02 KG/M2 | DIASTOLIC BLOOD PRESSURE: 70 MMHG

## 2018-01-14 VITALS
WEIGHT: 179.2 LBS | SYSTOLIC BLOOD PRESSURE: 112 MMHG | HEIGHT: 65 IN | DIASTOLIC BLOOD PRESSURE: 70 MMHG | BODY MASS INDEX: 29.85 KG/M2

## 2018-01-14 VITALS
BODY MASS INDEX: 26.26 KG/M2 | HEIGHT: 65 IN | SYSTOLIC BLOOD PRESSURE: 140 MMHG | DIASTOLIC BLOOD PRESSURE: 70 MMHG | WEIGHT: 157.6 LBS

## 2018-01-15 NOTE — MISCELLANEOUS
Message  Pt aware [facility] attempting to contact her to receive approval for medication shipment to our office  Phone number given to pt to return call to [address] [facility] [place]  Pt also aware [facility] will contact her when medication received at office and set up appt  for her injection  Active Problems    1  1St trimester screening (V28 89) (Z36)   2  Allergic rhinitis (477 9) (J30 9)   3  Anterior neck pain (723 1) (M54 2)   4  Establish gestational age, ultrasound (V28 3) (Z36)   5  Fatigue (780 79) (R53 83)   6  History of premature delivery, currently pregnant, first trimester (V23 41) (O09 211)   7  Irregular menstruation (626 4) (N92 6)   8  Need for prophylactic vaccination and inoculation against influenza (V04 81) (Z23)   9  Pregnancy (V22 2) (Z33 1)   10  Thyroid nodule (241 0) (E04 1)   11  Vitamin D deficiency (268 9) (E55 9)   12  Xerosis cutis (706 8) (L85 3)    Current Meds   1  CitraNatal Assure 35-1 & 300 MG Oral Miscellaneous; TAKE AS DIRECTED PER   PACKAGE INSTRUCTIONS; Therapy: [id number] to (Last Rx:10Oct2016)  Requested for: 11Oct2016 Ordered   2  Sudafed 30 MG Oral Tablet; TAKE 1 TABLET  per patient as needed; Therapy: (Recorded:30Nov2016) to Recorded   3  Tylenol Extra Strength 500 MG Oral Tablet; TAKE TABLET  per pt states taking as needed; Therapy: (Recorded:11Nov2016) to Recorded    Allergies    1  Pertussis Vaccine    2  Seasonal  Denied    3   Shellfish    Signatures   Electronically signed by : Jeremy Farah RN; Dec 28 2016 11:23AM EST                       (Author)

## 2018-01-15 NOTE — MISCELLANEOUS
Message  message left on Malou's cell number with results of stepwise part 1 within normal limits  instructions given for part 2 of test TRF mailed  to call back with questions      Active Problems    1  1St trimester screening (V28 89) (Z36)   2  Allergic rhinitis (477 9) (J30 9)   3  Anterior neck pain (723 1) (M54 2)   4  Establish gestational age, ultrasound (V28 3) (Z36)   5  Fatigue (780 79) (R53 83)   6  History of premature delivery, currently pregnant, first trimester (V23 41) (O09 211)   7  Irregular menstruation (626 4) (N92 6)   8  Need for prophylactic vaccination and inoculation against influenza (V04 81) (Z23)   9  Pregnancy (V22 2) (Z33 1)   10  Thyroid nodule (241 0) (E04 1)   11  Vitamin D deficiency (268 9) (E55 9)   12  Xerosis cutis (706 8) (L85 3)    Current Meds   1  CitraNatal Assure 35-1 & 300 MG Oral Miscellaneous; TAKE AS DIRECTED PER   PACKAGE INSTRUCTIONS; Therapy: [id number] to (Last Rx:10Oct2016)  Requested for: 11Oct2016 Ordered   2  Sudafed 30 MG Oral Tablet; TAKE 1 TABLET  per patient as needed; Therapy: (Recorded:30Nov2016) to Recorded   3  Tylenol Extra Strength 500 MG Oral Tablet; TAKE TABLET  per pt states taking as needed; Therapy: (Recorded:11Nov2016) to Recorded    Allergies    1  Pertussis Vaccine    2  Seasonal  Denied    3   Shellfish    Signatures   Electronically signed by : Bernard Mata, ; Dec  8 2016  1:12PM EST                       (Author)

## 2018-01-16 NOTE — PROGRESS NOTES
History of Present Illness  ED Outreach:   ED Visit Information  ED visit date: 9/16/2017  Diagnosis description: ABNORMAL UTERINE AND VAGINAL BLEEDING, UNSPECIFIED   Facility name: [facility]   Discharge status: Home  Number of ED visits to date: 1  ED severity: 4  In network  Outreach Information  Number of attempts - 2  Date finalized: 9/27/2017  Care Coordination  Emergent necessity warranted by diagnosis  [facility] PCP  Friend/family transport  If able to choose ED, would choose [facility]  Did not call PCP first  Would not have used same day or next day if offered  Feels able to call PCP for urgent problems  Understands what emergencies can be handled by PCP  Does not have problems getting appointment with PCP for minor emergency/urgency problems  Practice did not contact patient  No follow up appointment with PCP  Patient went to ED instead of UC or PCP - perceived severity of illness  Comments:   345pm 9/27/2017 LM  349pm PT states she is better than she was  She feels as though her symptoms have improved but they have not resolved  She stated the same thing happened when she had her twins  The only reason she went to the ER was because she was urged to go in  Her  took her in on a Saturday  She would have preferred to see her OB but they are not open on weekends  Her OB was contacted while she was at the ER  She states that getting an appt with her OB is hard because they work only 9-4 and she works full-time 8-5  She also has 3 children to worry about  She states she is taking Motrin and dealing with the bleeding  Asked the patient to please call OB if she can to set up an appt for FU  PT stated she would call OB later this week        Signatures   Electronically signed by : Britt Almonte, ; Sep 27 2017  4:02PM EST                       (Author)

## 2018-01-16 NOTE — MISCELLANEOUS
Message  Left message on pt cell phone regarding Shireen Dunlap reorder-[facility] will be contacting her for approval  Pt to call [facility] if questions  Active Problems    1  1St trimester screening (V28 89) (Z36)   2  Allergic rhinitis (477 9) (J30 9)   3   screening encounter (V28 9) (Z36)   4  Anterior neck pain (723 1) (M54 2)   5  Encounter for fetal anatomic survey (V2 81) (Z36)   6  Establish gestational age, ultrasound (V28 3) (Z36)   9  Fatigue (780 79) (R53 83)   8  History of premature delivery, currently pregnant, first trimester (V23 41) (O09 211)   9  History of premature delivery, currently pregnant, second trimester (V23 41) (O09 212)   10  Irregular menstruation (626 4) (N92 6)   11  Need for prophylactic vaccination and inoculation against influenza (V04 81) (Z23)   12  Pregnancy (V22 2) (Z33 1)   13  Thyroid nodule (241 0) (E04 1)   14  Upper respiratory infection, acute (465 9) (J06 9)   15  Vitamin D deficiency (268 9) (E55 9)   16  Xerosis cutis (706 8) (L85 3)    Current Meds   1  CitraNatal Assure 35-1 & 300 MG Oral Miscellaneous; TAKE AS DIRECTED PER   PACKAGE INSTRUCTIONS; Therapy: [id number] to (Last Rx:2016)  Requested for: 2016 Ordered   2  Britni 250 MG/ML Intramuscular Oil (Hydroxyprogesterone Caproate); INJECT 250    MG Intramuscular 250mg/ml  weekly until 36 weeks gestation; Therapy: [id number] to (Last Rx:2016)  Requested for: 2016 Ordered   3  ProAir RespiClick 403 (90 Base) MCG/ACT Inhalation Aerosol Powder Breath Activated;   INHALE 2 PUFFS Every 4 hours PRN Cough/wheeze; Therapy: [id number] to (Last Rx:2017)  Requested for: 2017 Ordered   4  Safe Tussin DM LIQD;   Therapy: (Recorded:2017) to Recorded   5  Sudafed 30 MG Oral Tablet; TAKE 1 TABLET  per patient as needed; Therapy: (Recorded:2016) to Recorded   6  Tylenol Extra Strength 500 MG Oral Tablet; TAKE TABLET  per pt states taking as needed;    Therapy: (Recorded:2016) to Recorded    Allergies    1  Pertussis Vaccine    2  Seasonal  Denied    3   Shellfish    Signatures   Electronically signed by : Madhu Rain RN; Feb 17 2017  2:45PM EST                       (Author)

## 2018-01-16 NOTE — RESULT NOTES
Verified Results  (Q) STEPWISE, PART 2 [id number] 07:12AM Shirin Kady   REPORT COMMENT:  FASTING:NO     Test Name Result Flag Reference   INTERPRETATION SEE NOTE     SCREEN NEGATIVE FOR OPEN NTD, DOWN SYNDROME AND TRISOMY 18   NT WAS USED IN THE RISK CALCULATIONS  RISK FOR ONTD 1:5000     AGE RISK DOWN SYNDROME 1:320     JOSEMANUEL DOWN SYNDROME RISK <1:5000  <1:270   RISK FOR TRISOMY 18 <1:5000  <1:100   CALCULATED GESTATIONAL$AGE 16 7     Crown rump length (CRL) was used to calculate gestational  age  CLIFF, if provided, was not used for gestational age  dating  INSULIN DEPEND DIABETIC NO     REPEAT SPECIMEN NO     NUMBER OF FETUSES 1     HX OF NEURAL TUBE DEFECTS NO     Twin B Nasal Bone NOT GIVEN     For additional information, please refer to  [email or web address]  (This link is provided for more informational/educational  purposes only )   CLIFF: 2017     NUCHAL TRANSLUCENCY 1 8 mm     Radium Rump Length 84 mm     Ultrasound Date 2016     Nasal Bone PRESENT     MOTHER'S ETHNIC ORIGIN      REFERRING PHYSICIAN PHONE [phone]     REFERRING PHYSICIAN NPI [id number]     SPECIMEN # FROM PART 1 [id number]     DATE OF BIRTH 1980     COLLECTION DATE 2016     MATERNAL WEIGHT 156 lbs     INHIBIN A, DIMERIC 131 pg/mL     INHIBIN A MOM 0 80     DEBORA A 2444 9 ng/mL     This test was performed using a kit that has not been  cleared or approved by the FDA  The analytical performance  characteristics of this test have been determined by [facility]  This test  should not be used for diagnosis without confirmation by  other medically established means  DEBORA-A MOM 1 92     NT MOM 0 98     The maternal serum screening results indicate a lower risk  of trisomy 21 in this pregnancy  The nasal bone was assessed  via ultrasound and was present  The combined risk is  therefore likely to be less than the calculated risk  Other  findings later in the pregnancy may change the risk  Nasal bone assessment is best accomplished through a fetal  ultrasound performed between 11 weeks 0 days through 13  weeks 6 days  In assessing the risk for aneuploidy, the  evaluation of the maternal serum markers plus the nuchal  thickness measurement is calculated first  Any potential  change to the patient's risk for aneuploidy depends on the  nuchal thickness, crown-rump length, and the ethnic origin,  and therefore the values generated by the algorithm itself  will not change  Additional information about the assessment  of the fetal nasal bone may be found on the [facility] website at  [email or web address]  fetalmediKUBOO/fmf/training-certification/cert  pjlotaxh-ag-xdqbd  tence/11-13-week-scan/assessment-of-the-nasal-bone/  The Sequential Integrated Screen combines DEBORA-A and hCG  with or without a nuchal translucency measurement in the  first trimester with AFP, unconjugated estriol, intact hCG  and Inhibin A in the second trimester  This provides a  useful screening test for detection of open neural tube  defects, Down syndrome and Trisomy 18  It should be noted  that normal results can never guarantee the birth of a  normal baby and that 2 to 3 percent of newborns have some  type of physical or mental defect, many of which are  undetectable through any known prenatal diagnostic  technique  This is a screening test, not a diagnostic test      This risk assessment is based on demographic data provided  by the ordering physician  Please notify the laboratory  promptly if any data are incorrect  If you have questions concerning this report: For clinical consultation, call [phone]; For technical questions, call [phone] ext [phone]; For recalculations, fax to [phone]     REFERRING PHYSICIAN NAME JESSE MONTGOMERY     AFP, SERUM 42 9 ng/mL     AFP MOM 1 23     Reference Range: <2 50                                        IDD        <1 90                                        TWINS      <4 00                                        TWINS IDD  <3 50                                        TRIPLETS   <4 50   HCG, SERUM 16 4 IU/mL     HCG MOM 0 56     ESTRIOL, FREE 1 57 ng/mL     ESTRIOL MOM 1 61

## 2018-01-17 NOTE — RESULT NOTES
Message   Please call patient, thyroid function is normal on recheck     Verified Results  (1) TSH WITH FT4 REFLEX 04Apr2016 07:56AM [facility] Order Number: [id number]    Patients undergoing fluorescein dye angiography may retain small amounts of fluorescein in the body for 48-72 hours post procedure  Samples containing fluorescein can produce falsely depressed TSH values  If the patient had this procedure,a specimen should be resubmitted post fluorescein clearance  The recommended reference ranges for TSH during pregnancy are as follows:  First trimester 0 1 to 2 5 uIU/mL  Second trimester  0 2 to 3 0 uIU/mL  Third trimester 0 3 to 3 0 uIU/m     Test Name Result Flag Reference   TSH 0 964 uIU/mL  0 358-3 740       Signatures   Electronically signed by :  Cherylene Quarry, M D ; Apr 5 2016  8:16PM EST                       (Author)

## 2018-01-18 ENCOUNTER — GENERIC CONVERSION - ENCOUNTER (OUTPATIENT)
Dept: OTHER | Facility: OTHER | Age: 38
End: 2018-01-18

## 2018-01-18 DIAGNOSIS — E55.9 VITAMIN D DEFICIENCY: ICD-10-CM

## 2018-01-18 DIAGNOSIS — F41.8 OTHER SPECIFIED ANXIETY DISORDERS: ICD-10-CM

## 2018-01-18 NOTE — PROGRESS NOTES
2016         RE: Yvonna Cushing Sheets                                  To: [facility]   MR#: [id number]                                     5 Rosy Moore   : [address]: [phone]                             Fax: [phone]   (Exam #: [id number])      The LMP of this 31year old,  G6, P0-2-4-2 patient was AUG 28 2016, giving   her an CLIFF of 2017 and a current gestational age of 9 weeks 5 days   by dates  A sonographic examination was performed on 2016 using   real time equipment  The ultrasound examination was performed using   abdominal technique  The patient has a BMI of 25 5  Her blood pressure   today was 107/58  Earliest ultrasound found in her record: MFM 16  11w4d  CLIFF 17      Thank you very much for your kind referral of Sravanthi Paz to the   [facility]  in [place] on 2016 for first trimester   ultrasound evaluation and  assessment  Yvonna Cushing is a 62-year-old   white female who is currently at 10-5/7 weeks gestation by an estimated   due date of 2017  She has no complaints her visit today  Yvonna Cushing   denies vaginal bleeding  Her early prenatal course has been unremarkable  She met with Víctor Robin earlier today for genetic counseling to   discuss genetic testing options  Yvonna Cushing has a history of four first trimester spontaneous pregnancy losses  Her only successful pregnancy resulted in delivery of twins at 32-6/7   weeks gestation following  premature rupture the membranes  That   pregnancy was complicated by gestational diabetes  She delivered by    section on 2015  Her baby girls weighed 4 lbs  4 oz  and 4 lbs  11 oz  One of the baby girls was diagnosed after delivery with   torticollis  Each of her children currently, however, is healthy  Yvonna Cushing   has an unremarkable past medical history   Her past surgical history is significant for laparoscopic surgery with an apparent finding of   endometriosis  Yvonna Cushing currently takes no medication with the exception of a   prenatal vitamin on a daily basis and has no known drug allergy  She   smokes an occasional cigarette but denies alcohol or illicit drug use   during the pregnancy  The family genetic history is noncontributory  Multiple longitudinal and transverse sections revealed a farris   intrauterine pregnancy with the fetus in transverse presentation  The   placenta is posterior in implantation  A normal gestational sac was documented  A normal fetal pole was   visualized  Cardiac motion was observed at 171 bpm       INDICATIONS      viability   previous  delivery      Exam Types      Level I      RESULTS      Fetus # 1 of 1   Transverse presentation   Fetal growth appeared normal      MEASUREMENTS (* Included In Average GA)      CRL              5 0 cm        11 weeks 4 days*   Nuchal Trans    1 10 mm      THE AVERAGE GESTATIONAL AGE is 11 weeks 4 days +/- 7 days  ANATOMY COMMENTS      Anatomic detail is limited at this gestational age  The yolk sac was not   noted  The fetal cranium appeared normal in shape and the nuchal   translucency was normal in size  The nasal bone appears to be present  The intracranial anatomy was unremarkable  Anatomy of the fetal thorax   appeared within normal limits  The cardiac rhythm was regular  Within the   abdomen, stomach & bladder were visualized and the abdominal wall appeared   intact  Active movement of the fetal body & extremities was seen  There   is no suspicion of a subchorionic bleed  The placental cord insertion was   normal    There is no suspicion of a uterine myoma  Free fluid is not seen   in the posterior cul-de-sac  ADNEXA      The left ovary appeared normal and measured 3 1 x 2 3 x 1 9 cm with a   volume of 7 1 cc   The right ovary appeared normal and measured 3 2 x 2 8 x   2 3 cm with a volume of 10 8 cc  AMNIOTIC FLUID         Largest Vertical Pocket = 3 0 cm   Amniotic Fluid: Normal      IMPRESSION      Estrada IUP   11 weeks and 4 days by this ultrasound  (CLIFF=MAY 29 2017)   Transverse presentation   Fetal growth appeared normal   Regular fetal heart rate of 171 bpm   Posterior placenta      GENERAL COMMENT      Today's ultrasound findings and suggested follow-up were discussed in   detail with David Howe  She will return to the [facility]  on    for the first trimester component of the Sequential Screen, which was   discussed today  Level II ultrasound evaluation will be performed at 20   weeks gestation  Her history of a prior spontaneous  birth of twins   is associated with an increased risk for spontaneous  birth in her   current pregnancy  Clinical awareness should be maintained in this regard  Given her history of prior gestational diabetes, early screening is   recommended in her current pregnancy, with repeat evaluation between 25   and 28 weeks gestation if initially negative  The face to face time, in addition to time spent discussing ultrasound   results, was approximately 10 minutes, greater than 50% of which was spent   during counseling and coordination of care  Dearl ADDY Tan M D     Maternal-Fetal Medicine   Electronically signed 16 10:39

## 2018-01-22 VITALS
DIASTOLIC BLOOD PRESSURE: 70 MMHG | BODY MASS INDEX: 29.29 KG/M2 | SYSTOLIC BLOOD PRESSURE: 131 MMHG | WEIGHT: 175.8 LBS | HEIGHT: 65 IN

## 2018-01-22 VITALS
DIASTOLIC BLOOD PRESSURE: 67 MMHG | SYSTOLIC BLOOD PRESSURE: 114 MMHG | HEIGHT: 65 IN | WEIGHT: 168.2 LBS | BODY MASS INDEX: 28.02 KG/M2

## 2018-01-22 VITALS
BODY MASS INDEX: 29.79 KG/M2 | HEIGHT: 65 IN | SYSTOLIC BLOOD PRESSURE: 123 MMHG | WEIGHT: 178.8 LBS | DIASTOLIC BLOOD PRESSURE: 65 MMHG

## 2018-01-22 VITALS
WEIGHT: 160.2 LBS | DIASTOLIC BLOOD PRESSURE: 68 MMHG | HEIGHT: 65 IN | SYSTOLIC BLOOD PRESSURE: 131 MMHG | BODY MASS INDEX: 26.69 KG/M2

## 2018-01-22 VITALS
HEIGHT: 65 IN | BODY MASS INDEX: 30.34 KG/M2 | DIASTOLIC BLOOD PRESSURE: 82 MMHG | SYSTOLIC BLOOD PRESSURE: 126 MMHG | WEIGHT: 182.13 LBS

## 2018-01-22 VITALS
BODY MASS INDEX: 27.2 KG/M2 | HEIGHT: 65 IN | SYSTOLIC BLOOD PRESSURE: 100 MMHG | WEIGHT: 163.25 LBS | DIASTOLIC BLOOD PRESSURE: 70 MMHG

## 2018-01-22 VITALS
WEIGHT: 163 LBS | HEIGHT: 65 IN | BODY MASS INDEX: 27.16 KG/M2 | SYSTOLIC BLOOD PRESSURE: 133 MMHG | DIASTOLIC BLOOD PRESSURE: 59 MMHG

## 2018-01-22 VITALS
BODY MASS INDEX: 29.56 KG/M2 | SYSTOLIC BLOOD PRESSURE: 133 MMHG | HEIGHT: 65 IN | DIASTOLIC BLOOD PRESSURE: 66 MMHG | WEIGHT: 177.4 LBS

## 2018-01-22 VITALS
HEIGHT: 65 IN | BODY MASS INDEX: 28.66 KG/M2 | DIASTOLIC BLOOD PRESSURE: 58 MMHG | SYSTOLIC BLOOD PRESSURE: 120 MMHG | WEIGHT: 172 LBS

## 2018-01-22 VITALS
BODY MASS INDEX: 29.32 KG/M2 | WEIGHT: 176 LBS | DIASTOLIC BLOOD PRESSURE: 80 MMHG | HEIGHT: 65 IN | SYSTOLIC BLOOD PRESSURE: 116 MMHG

## 2018-01-22 VITALS
WEIGHT: 160 LBS | HEIGHT: 65 IN | BODY MASS INDEX: 26.66 KG/M2 | DIASTOLIC BLOOD PRESSURE: 55 MMHG | SYSTOLIC BLOOD PRESSURE: 115 MMHG

## 2018-01-22 VITALS
DIASTOLIC BLOOD PRESSURE: 64 MMHG | SYSTOLIC BLOOD PRESSURE: 100 MMHG | BODY MASS INDEX: 27.99 KG/M2 | HEIGHT: 65 IN | WEIGHT: 168 LBS

## 2018-01-22 VITALS
SYSTOLIC BLOOD PRESSURE: 137 MMHG | HEIGHT: 65 IN | DIASTOLIC BLOOD PRESSURE: 69 MMHG | BODY MASS INDEX: 27.96 KG/M2 | WEIGHT: 167.8 LBS

## 2018-01-22 VITALS
BODY MASS INDEX: 27.32 KG/M2 | WEIGHT: 164 LBS | DIASTOLIC BLOOD PRESSURE: 78 MMHG | SYSTOLIC BLOOD PRESSURE: 122 MMHG | HEIGHT: 65 IN

## 2018-01-22 VITALS
BODY MASS INDEX: 28.57 KG/M2 | WEIGHT: 171.5 LBS | HEIGHT: 65 IN | SYSTOLIC BLOOD PRESSURE: 120 MMHG | DIASTOLIC BLOOD PRESSURE: 70 MMHG

## 2018-01-22 VITALS
SYSTOLIC BLOOD PRESSURE: 137 MMHG | HEIGHT: 65 IN | BODY MASS INDEX: 30.02 KG/M2 | WEIGHT: 180.2 LBS | DIASTOLIC BLOOD PRESSURE: 82 MMHG

## 2018-01-22 VITALS
WEIGHT: 157 LBS | DIASTOLIC BLOOD PRESSURE: 70 MMHG | HEIGHT: 65 IN | SYSTOLIC BLOOD PRESSURE: 114 MMHG | BODY MASS INDEX: 26.16 KG/M2

## 2018-01-22 VITALS
SYSTOLIC BLOOD PRESSURE: 120 MMHG | BODY MASS INDEX: 29.66 KG/M2 | WEIGHT: 178 LBS | DIASTOLIC BLOOD PRESSURE: 70 MMHG | HEIGHT: 65 IN

## 2018-01-22 VITALS
BODY MASS INDEX: 29.85 KG/M2 | SYSTOLIC BLOOD PRESSURE: 116 MMHG | WEIGHT: 179.19 LBS | HEIGHT: 65 IN | DIASTOLIC BLOOD PRESSURE: 70 MMHG

## 2018-01-22 VITALS
HEIGHT: 65 IN | DIASTOLIC BLOOD PRESSURE: 63 MMHG | WEIGHT: 163 LBS | SYSTOLIC BLOOD PRESSURE: 128 MMHG | BODY MASS INDEX: 27.16 KG/M2

## 2018-01-22 VITALS
WEIGHT: 174 LBS | BODY MASS INDEX: 28.99 KG/M2 | DIASTOLIC BLOOD PRESSURE: 63 MMHG | SYSTOLIC BLOOD PRESSURE: 132 MMHG | HEIGHT: 65 IN

## 2018-01-24 ENCOUNTER — TRANSCRIBE ORDERS (OUTPATIENT)
Dept: LAB | Facility: CLINIC | Age: 38
End: 2018-01-24

## 2018-01-24 ENCOUNTER — LAB (OUTPATIENT)
Dept: LAB | Facility: CLINIC | Age: 38
End: 2018-01-24
Payer: COMMERCIAL

## 2018-01-24 ENCOUNTER — OFFICE VISIT (OUTPATIENT)
Dept: BEHAVIORAL/MENTAL HEALTH CLINIC | Facility: CLINIC | Age: 38
End: 2018-01-24
Payer: COMMERCIAL

## 2018-01-24 VITALS
SYSTOLIC BLOOD PRESSURE: 110 MMHG | HEIGHT: 65 IN | WEIGHT: 167.25 LBS | DIASTOLIC BLOOD PRESSURE: 82 MMHG | BODY MASS INDEX: 27.86 KG/M2 | RESPIRATION RATE: 15 BRPM | HEART RATE: 92 BPM | TEMPERATURE: 97.6 F

## 2018-01-24 DIAGNOSIS — R53.83 FATIGUE, UNSPECIFIED TYPE: Primary | ICD-10-CM

## 2018-01-24 DIAGNOSIS — F41.8 OTHER SPECIFIED ANXIETY DISORDERS: ICD-10-CM

## 2018-01-24 DIAGNOSIS — N92.0 EXCESSIVE OR FREQUENT MENSTRUATION: ICD-10-CM

## 2018-01-24 DIAGNOSIS — E55.9 VITAMIN D DEFICIENCY: ICD-10-CM

## 2018-01-24 DIAGNOSIS — F41.8 DEPRESSION WITH ANXIETY: ICD-10-CM

## 2018-01-24 DIAGNOSIS — F41.1 GENERALIZED ANXIETY DISORDER: Primary | ICD-10-CM

## 2018-01-24 LAB
25(OH)D3 SERPL-MCNC: 19 NG/ML (ref 30–100)
ALBUMIN SERPL BCP-MCNC: 4.3 G/DL (ref 3.5–5)
ALP SERPL-CCNC: 108 U/L (ref 46–116)
ALT SERPL W P-5'-P-CCNC: 19 U/L (ref 12–78)
ANION GAP SERPL CALCULATED.3IONS-SCNC: 7 MMOL/L (ref 4–13)
AST SERPL W P-5'-P-CCNC: 12 U/L (ref 5–45)
BASOPHILS # BLD AUTO: 0.04 THOUSANDS/ΜL (ref 0–0.1)
BASOPHILS NFR BLD AUTO: 1 % (ref 0–1)
BILIRUB SERPL-MCNC: 0.75 MG/DL (ref 0.2–1)
BUN SERPL-MCNC: 10 MG/DL (ref 5–25)
CALCIUM SERPL-MCNC: 9 MG/DL (ref 8.3–10.1)
CHLORIDE SERPL-SCNC: 106 MMOL/L (ref 100–108)
CO2 SERPL-SCNC: 25 MMOL/L (ref 21–32)
CREAT SERPL-MCNC: 0.66 MG/DL (ref 0.6–1.3)
EOSINOPHIL # BLD AUTO: 0.07 THOUSAND/ΜL (ref 0–0.61)
EOSINOPHIL NFR BLD AUTO: 1 % (ref 0–6)
ERYTHROCYTE [DISTWIDTH] IN BLOOD BY AUTOMATED COUNT: 14 % (ref 11.6–15.1)
GFR SERPL CREATININE-BSD FRML MDRD: 115 ML/MIN/1.73SQ M
GLUCOSE P FAST SERPL-MCNC: 100 MG/DL (ref 65–99)
HCT VFR BLD AUTO: 39.4 % (ref 34.8–46.1)
HGB BLD-MCNC: 12.9 G/DL (ref 11.5–15.4)
LYMPHOCYTES # BLD AUTO: 2.42 THOUSANDS/ΜL (ref 0.6–4.47)
LYMPHOCYTES NFR BLD AUTO: 34 % (ref 14–44)
MCH RBC QN AUTO: 26.1 PG (ref 26.8–34.3)
MCHC RBC AUTO-ENTMCNC: 32.7 G/DL (ref 31.4–37.4)
MCV RBC AUTO: 80 FL (ref 82–98)
MONOCYTES # BLD AUTO: 0.39 THOUSAND/ΜL (ref 0.17–1.22)
MONOCYTES NFR BLD AUTO: 6 % (ref 4–12)
NEUTROPHILS # BLD AUTO: 4.13 THOUSANDS/ΜL (ref 1.85–7.62)
NEUTS SEG NFR BLD AUTO: 58 % (ref 43–75)
NRBC BLD AUTO-RTO: 0 /100 WBCS
PLATELET # BLD AUTO: 312 THOUSANDS/UL (ref 149–390)
PMV BLD AUTO: 10.9 FL (ref 8.9–12.7)
POTASSIUM SERPL-SCNC: 3.6 MMOL/L (ref 3.5–5.3)
PROT SERPL-MCNC: 7.7 G/DL (ref 6.4–8.2)
RBC # BLD AUTO: 4.94 MILLION/UL (ref 3.81–5.12)
SODIUM SERPL-SCNC: 138 MMOL/L (ref 136–145)
TSH SERPL DL<=0.05 MIU/L-ACNC: 0.9 UIU/ML (ref 0.36–3.74)
WBC # BLD AUTO: 7.06 THOUSAND/UL (ref 4.31–10.16)

## 2018-01-24 PROCEDURE — 84443 ASSAY THYROID STIM HORMONE: CPT

## 2018-01-24 PROCEDURE — 83550 IRON BINDING TEST: CPT

## 2018-01-24 PROCEDURE — 85025 COMPLETE CBC W/AUTO DIFF WBC: CPT

## 2018-01-24 PROCEDURE — 82728 ASSAY OF FERRITIN: CPT

## 2018-01-24 PROCEDURE — 83540 ASSAY OF IRON: CPT

## 2018-01-24 PROCEDURE — 90834 PSYTX W PT 45 MINUTES: CPT | Performed by: SOCIAL WORKER

## 2018-01-24 PROCEDURE — 82306 VITAMIN D 25 HYDROXY: CPT

## 2018-01-24 PROCEDURE — 80053 COMPREHEN METABOLIC PANEL: CPT

## 2018-01-24 PROCEDURE — 36415 COLL VENOUS BLD VENIPUNCTURE: CPT

## 2018-01-24 NOTE — PROGRESS NOTES
Assessment/Plan:      Diagnoses and all orders for this visit:    Generalized anxiety disorder          Subjective:     Patient ID: Poncho Rolle is a 30 y o  female  D:Met with pt for an initial session  Pt states that she had a "break down" last week, which was the reason for the referral to this worker  Pt states that she has been dealing with a significant amount of stress related to caring for her children, working full time, and relationship issues with her   Pt reports that she had not slept well at night for approximately 3 weeks and she feels last weeks break down was due to that and the stress  Allowed pt time to vent about stress and discussed options for treatment  Gave pt some agencies to contact about marriage counseling  Pt will follow up as needed in the future  A: Pt recently started taking Lexapro  This is the first SSRI medication that the pt has been on  Pt denies and negative side affects of the medication at this time  Pt was in counseling in the past for approximately 3 months and did find it helpful  Pt reports that she gets approximately 6 hours of sleep a night but that it is not consecutive hours due to her 7 month old not sleeping consecutive hours  Pt has an 7 month old, 35 year old twins, her , and her parents in her home  Pt has support in family and friends  P: Pt was given resources to contact for marriage counseling and will follow up with this worker as needed in the future  Review of Systems   Psychiatric/Behavioral: Positive for agitation and sleep disturbance  Negative for self-injury and suicidal ideas  Pt reports an increase in her irritability recently and issues with consistent sleep due to her 7 month old teething         Objective:     Physical Exam   Psychiatric: She has a normal mood and affect   Her speech is normal and behavior is normal  Judgment and thought content normal  Cognition and memory are normal

## 2018-01-25 ENCOUNTER — TELEPHONE (OUTPATIENT)
Dept: FAMILY MEDICINE CLINIC | Facility: CLINIC | Age: 38
End: 2018-01-25

## 2018-01-25 DIAGNOSIS — E55.9 VITAMIN D DEFICIENCY: Primary | ICD-10-CM

## 2018-01-25 LAB
FERRITIN SERPL-MCNC: 15 NG/ML (ref 8–388)
IRON SATN MFR SERPL: 23 %
IRON SERPL-MCNC: 95 UG/DL (ref 50–170)
TIBC SERPL-MCNC: 409 UG/DL (ref 250–450)

## 2018-01-25 RX ORDER — ERGOCALCIFEROL 1.25 MG/1
CAPSULE ORAL
Qty: 10 CAPSULE | Refills: 0 | Status: SHIPPED | OUTPATIENT
Start: 2018-01-25 | End: 2018-02-21 | Stop reason: ALTCHOICE

## 2018-01-25 NOTE — PROGRESS NOTES
Please call patient: her blood work is all good  Her iron stores are normal, but the low end of normal  I would recommend eating an iron rich diet and  beginning a daily multivitamin that has iron

## 2018-01-25 NOTE — TELEPHONE ENCOUNTER
----- Message from 53Riddhi Kin Parry sent at 1/25/2018 12:30 PM EST -----  Please let patient know her vitamin D level is low  I have sent a prescription to the pharmacy for her to begin supplementation  Once she completes the prescription, she should begin taking 2000 units of OTC vitamin D daily

## 2018-01-25 NOTE — PROGRESS NOTES
Please let patient know her vitamin D level is low  I have sent a prescription to the pharmacy for her to begin supplementation  Once she completes the prescription, she should begin taking 2000 units of OTC vitamin D daily

## 2018-01-25 NOTE — TELEPHONE ENCOUNTER
----- Message from [address] [place] [facility] sent at 1/25/2018 12:28 PM EST -----  Please call patient: her blood work is all good  Her iron stores are normal, but the low end of normal  I would recommend eating an iron rich diet and  beginning a daily multivitamin that has iron

## 2018-02-19 RX ORDER — MEDROXYPROGESTERONE ACETATE 10 MG/1
1 TABLET ORAL DAILY
COMMUNITY
Start: 2017-09-16 | End: 2018-02-21 | Stop reason: ALTCHOICE

## 2018-02-19 RX ORDER — OXYCODONE HYDROCHLORIDE AND ACETAMINOPHEN 5; 325 MG/1; MG/1
1 TABLET ORAL EVERY 6 HOURS PRN
COMMUNITY
Start: 2017-06-02 | End: 2018-02-21 | Stop reason: ALTCHOICE

## 2018-02-19 RX ORDER — ACETAMINOPHEN 500 MG
TABLET ORAL
COMMUNITY
End: 2019-04-16

## 2018-02-19 RX ORDER — PSEUDOEPHEDRINE HYDROCHLORIDE 30 MG/1
TABLET ORAL
COMMUNITY
End: 2019-04-16

## 2018-02-19 RX ORDER — ESCITALOPRAM OXALATE 10 MG/1
1 TABLET ORAL DAILY
COMMUNITY
Start: 2018-01-18 | End: 2018-02-21 | Stop reason: SDUPTHER

## 2018-02-21 ENCOUNTER — OFFICE VISIT (OUTPATIENT)
Dept: FAMILY MEDICINE CLINIC | Facility: CLINIC | Age: 38
End: 2018-02-21
Payer: COMMERCIAL

## 2018-02-21 ENCOUNTER — OFFICE VISIT (OUTPATIENT)
Dept: OBGYN CLINIC | Facility: CLINIC | Age: 38
End: 2018-02-21
Payer: COMMERCIAL

## 2018-02-21 VITALS
DIASTOLIC BLOOD PRESSURE: 82 MMHG | SYSTOLIC BLOOD PRESSURE: 120 MMHG | WEIGHT: 163.4 LBS | HEIGHT: 66 IN | RESPIRATION RATE: 16 BRPM | BODY MASS INDEX: 26.26 KG/M2 | TEMPERATURE: 97.9 F | HEART RATE: 88 BPM

## 2018-02-21 VITALS
DIASTOLIC BLOOD PRESSURE: 82 MMHG | BODY MASS INDEX: 27.16 KG/M2 | HEIGHT: 65 IN | WEIGHT: 163 LBS | SYSTOLIC BLOOD PRESSURE: 124 MMHG

## 2018-02-21 DIAGNOSIS — Z01.411 ENCOUNTER FOR GYNECOLOGICAL EXAMINATION (GENERAL) (ROUTINE) WITH ABNORMAL FINDINGS: ICD-10-CM

## 2018-02-21 DIAGNOSIS — J01.90 ACUTE NON-RECURRENT SINUSITIS, UNSPECIFIED LOCATION: Primary | ICD-10-CM

## 2018-02-21 DIAGNOSIS — N80.9 ENDOMETRIOSIS DETERMINED BY LAPAROSCOPY: ICD-10-CM

## 2018-02-21 DIAGNOSIS — Z12.39 SCREENING FOR BREAST CANCER: ICD-10-CM

## 2018-02-21 DIAGNOSIS — Z01.419 ENCOUNTER FOR GYNECOLOGICAL EXAMINATION WITHOUT ABNORMAL FINDING: Primary | ICD-10-CM

## 2018-02-21 DIAGNOSIS — F41.9 ANXIETY AND DEPRESSION: ICD-10-CM

## 2018-02-21 DIAGNOSIS — F32.A ANXIETY AND DEPRESSION: ICD-10-CM

## 2018-02-21 DIAGNOSIS — N92.0 MENORRHAGIA WITH REGULAR CYCLE: ICD-10-CM

## 2018-02-21 PROBLEM — Z98.891 HISTORY OF LOW TRANSVERSE CESAREAN SECTION: Status: RESOLVED | Noted: 2017-05-26 | Resolved: 2018-02-21

## 2018-02-21 PROCEDURE — S0612 ANNUAL GYNECOLOGICAL EXAMINA: HCPCS | Performed by: OBSTETRICS & GYNECOLOGY

## 2018-02-21 PROCEDURE — 99214 OFFICE O/P EST MOD 30 MIN: CPT | Performed by: FAMILY MEDICINE

## 2018-02-21 RX ORDER — TRANEXAMIC ACID 650 1/1
2 TABLET ORAL
Qty: 15 TABLET | Refills: 1 | Status: SHIPPED | OUTPATIENT
Start: 2018-02-21 | End: 2018-02-26

## 2018-02-21 RX ORDER — DOXYCYCLINE HYCLATE 100 MG/1
100 CAPSULE ORAL EVERY 12 HOURS SCHEDULED
Qty: 14 CAPSULE | Refills: 0 | Status: SHIPPED | OUTPATIENT
Start: 2018-02-21 | End: 2018-02-28

## 2018-02-21 RX ORDER — ESCITALOPRAM OXALATE 10 MG/1
10 TABLET ORAL DAILY
Qty: 30 TABLET | Refills: 2 | Status: SHIPPED | OUTPATIENT
Start: 2018-02-21 | End: 2018-07-16 | Stop reason: SDUPTHER

## 2018-02-21 NOTE — PROGRESS NOTES
FAMILY PRACTICE OFFICE VISIT       NAME: Alli Fink  AGE: 30 y o  SEX: female       : 1980        MRN: [id number]    DATE: 2018  TIME: 7:56 PM    Assessment and Plan     Problem List Items Addressed This Visit     Anxiety and depression    Relevant Medications    escitalopram (LEXAPRO) 10 mg tablet      Other Visit Diagnoses     Acute non-recurrent sinusitis, unspecified location    -  Primary    Relevant Medications    doxycycline hyclate (VIBRAMYCIN) 100 mg capsule            There are no Patient Instructions on file for this visit  1  Acute non-recurrent sinusitis, unspecified location  doxycycline hyclate (VIBRAMYCIN) 100 mg capsule   2  Anxiety and depression  escitalopram (LEXAPRO) 10 mg tablet     Sinusitis:  Recommend treatment with a course of doxycycline 100 mg twice daily for 7 days  She prefers not to be treated with Augmentin, as this has caused her to have significant oral thrush and vaginal yeast infections in the past   Recommend eating yogurt daily taking over-the-counter probiotics while taking antibiotics  Take antibiotics with food  Continue supportive care:  Rest, increase fluids, warm fluids, honey, and humidification  May use Tylenol or ibuprofen as needed  If symptoms worsen, or if symptoms do not improve over the next few days, instructed to call the office  Anxiety and depression:  Stable on Lexapro  Will continue Lexapro, refills sent to the pharmacy  Chief Complaint     Chief Complaint   Patient presents with    Follow-up    Sinusitis     Pt has c/o of sinus pressure and sinus pain times 1 month        History of Present Illness     Alli Penn presents today for follow up visit  She is doing well on Lexapro  Initially was feeling tired and thirsty, but those symptoms are improving   She feels as if her emotions are in "check" now, she is able to "laugh things off, instead of blowing up " She is communicating better with her  and he is helping more around the house  They were able to have a date night  Her  is helping with the baby at night if he wakes as well, allowing Malou to sleep better  She did meet with Kaitlynn Villatoro for counseling, which she did find beneficial      She does feel as if she may have a sinus infection  Has had sinus pressure, nasal congestion, fatigue, and sore throat for the past 3 weeks that is not improving  Review of Systems   Review of Systems   Constitutional: Positive for fatigue  Negative for chills and fever  HENT: Positive for congestion, postnasal drip, rhinorrhea, sinus pressure and sore throat  Negative for ear pain  Respiratory: Positive for cough  Negative for chest tightness, shortness of breath and wheezing  Cardiovascular: Negative for chest pain  Gastrointestinal: Negative  Musculoskeletal: Negative for myalgias  Neurological: Negative for dizziness and headaches  Psychiatric/Behavioral: Negative for dysphoric mood, self-injury, sleep disturbance and suicidal ideas  The patient is not nervous/anxious          Active Problem List     Patient Active Problem List   Diagnosis    Menorrhagia with regular cycle    Endometriosis determined by laparoscopy    Allergic rhinitis    Anxiety and depression       Past Medical History:  Past Medical History:   Diagnosis Date    Chronic kidney disease     [id number]    Diabetes mellitus ([facility] [address] )     gd last preg    Disease of thyroid gland     nodule    Endometriosis     Female infertility     iui with prior preg    Generalized anxiety disorder     last assessed 14    Renal calculi     last assessed 10/20/14; US 10/2014 3mm and 4mm right, non obstructing    Varicella     childhood       Past Surgical History:  Past Surgical History:   Procedure Laterality Date    APPENDECTOMY       SECTION      DIAGNOSTIC LAPAROSCOPY      multiple, last 10/2012, 10/2014    LAPAROSCOPIC OVARIAN CYSTECTOMY      LASER LAPAROSCOPY      NASAL SEPTUM SURGERY      AL  DELIVERY ONLY N/A 2017    Procedure:  SECTION () REPEAT;  Surgeon: Pauline Mccormick DO;  Location: [facility] ;  Service: Obstetrics    AL LIGATION,FALLOPIAN TUBE W/ Bilateral 2017    Procedure: LIGATION/COAGULATION TUBAL;  Surgeon: Pauline Mccormick DO;  Location: [facility] ;  Service: Obstetrics       Family History:  Family History   Problem Relation Age of Onset    Depression Mother     Hypertension Mother     Miscarriages / [place] Mother     Anxiety disorder Mother      NOS    Heart defect Mother      aortic valve disorder    Endometriosis Mother     Cancer Sister      breast    Alcohol abuse Maternal Uncle     Cancer Maternal Grandmother      colon    Depression Maternal Grandmother     Dementia Maternal Grandmother     Vision loss Maternal Grandmother     Arthritis Paternal Grandmother     Hearing loss Paternal Grandmother     Heart disease Paternal Grandmother     Stroke Paternal Grandmother    [de-identified] / Stillbirths Paternal Zygmunt Gambles     Colon cancer Paternal Zygmunt Gambles     Hyperlipidemia Father      high cholesterol    Endometriosis Sister        Social History:  Social History     Social History    Marital status: /Civil Union     Spouse name: N/A    Number of children: N/A    Years of education: N/A     Occupational History    Not on file       Social History Main Topics    Smoking status: Former Smoker    Smokeless tobacco: Never Used      Comment: Current smoker-quit 2 weeks agao, when found pregnant, smoked half pay daily, as per [employer]    Alcohol use No    Drug use: No    Sexual activity: Yes     Partners: Male     Birth control/ protection: Female Sterilization     Other Topics Concern    Not on file     Social History Narrative    No narrative on file       I have reviewed the patient's medical history in detail; there are no changes to the history as noted in the electronic medical record  Objective     Vitals:    02/21/18 1747   BP: 120/82   BP Location: Left arm   Patient Position: Sitting   Cuff Size: Adult   Pulse: 88   Resp: 16   Temp: 97 9 °F (36 6 °C)   TempSrc: Tympanic   Weight: 74 1 kg (163 lb 6 4 oz)   Height: 5' 5 5" (1 664 m)     Wt Readings from Last 3 Encounters:   02/21/18 74 1 kg (163 lb 6 4 oz)   02/21/18 73 9 kg (163 lb)   01/18/18 75 9 kg (167 lb 4 oz)     Body mass index is 26 78 kg/m²  Physical Exam   Constitutional: She is oriented to person, place, and time  She appears well-developed and well-nourished  HENT:   Head: Normocephalic and atraumatic  Right Ear: Tympanic membrane and ear canal normal    Left Ear: Tympanic membrane and ear canal normal    Nose: Mucosal edema present  Mouth/Throat: Posterior oropharyngeal erythema (Postnasal drip) present  No oropharyngeal exudate  Eyes: Conjunctivae are normal    Neck: Normal range of motion  Neck supple  Cardiovascular: Normal rate, regular rhythm and normal heart sounds  No murmur heard  Pulmonary/Chest: Effort normal and breath sounds normal    Musculoskeletal: Normal range of motion  She exhibits no edema  Lymphadenopathy:     She has no cervical adenopathy  Neurological: She is alert and oriented to person, place, and time  Skin: No rash noted  Psychiatric: She has a normal mood and affect  Nursing note and vitals reviewed        ALLERGIES:  Allergies   Allergen Reactions    Cefprozil Shortness Of Breath    Amoxicillin      Thrush,yeast infection    Pertussis Vaccine     Shellfish-Derived Products Hives       Current Medications     Current Outpatient Prescriptions   Medication Sig Dispense Refill    escitalopram (LEXAPRO) 10 mg tablet Take 1 tablet (10 mg total) by mouth daily 30 tablet 2    pseudoephedrine (SUDAFED) 30 mg tablet Take by mouth      Tranexamic Acid 650 MG TABS Take 2 tablets by mouth 3 (three) times a day for 5 days 15 tablet 1    acetaminophen (TYLENOL) 500 mg tablet Take by mouth      doxycycline hyclate (VIBRAMYCIN) 100 mg capsule Take 1 capsule (100 mg total) by mouth every 12 (twelve) hours for 7 days 14 capsule 0     No current facility-administered medications for this visit            Health Maintenance     Health Maintenance   Topic Date Due    Depression Screening PHQ-9  07/24/1994    DTaP,Tdap,and Td Vaccines (3 - Tdap) 02/28/2009    INFLUENZA VACCINE  09/01/2017    HIV SCREENING  10/21/2019     Immunization History   Administered Date(s) Administered    DT (pediatric) 07/16/1984, 10/11/1993    DTaP 5 1982, 1982, 1982, 03/28/1983, 08/29/2008    IPV 01/03/1983, 01/28/1983, 04/25/1983, 07/16/1984, 10/01/1987    Influenza Quadrivalent Preservative Free 3 years and older IM 10/10/2015, 10/08/2016    Influenza, Quadrivalent (nasal) 10/10/2015, 10/08/2016    MMR 11/23/1983, 10/11/1993    Meningococcal, Unknown Serogroups 08/02/2000, 08/29/2008    Mumps 08/22/1983    Rubella 11/23/1983, 09/21/1984    Tuberculin Skin Test-PPD Intradermal 02/06/2012       AQUILES Coleman

## 2018-02-21 NOTE — PROGRESS NOTES
Assessment/Plan:    Pap smear done with HPV  Encouraged self-breast examination as well as calcium supplementation  Recommend mammogram given strong family history of breast cancer  She will check see if this is covered by her insurance  Discussed treatment options/for menorrhagia/dysmenorrhea/endometriosis including medical versus surgical options  Patient would like to avoid surgery at all cost   I then discussed options including OCPs, Mirena IUD, lysteda, and Lupron therapy  Surgical options include endometrial ablation versus hysterectomy  We discussed that the ablation would just help with menorrhagia but likely not treat endometriosis  At this point she would like to try lysteda, initiating day 1 of menstrual cycle for the next 5 consecutive days  I am also requesting a pelvic ultrasound  She will return to office in 1 year and call with follow-up after ultrasound is done  No problem-specific Assessment & Plan notes found for this encounter  Diagnoses and all orders for this visit:    Encounter for gynecological examination without abnormal finding  -     Thinprep Pap and HPV mRNA E6/E7    Menorrhagia with regular cycle  -     US pelvis complete w transvaginal  -     Tranexamic Acid 650 MG TABS; Take 2 tablets by mouth 3 (three) times a day for 5 days    Screening for breast cancer  -     Mammo screening bilateral w cad    Endometriosis determined by laparoscopy          Subjective:      Patient ID: Kassandra Johnson is a 30 y o  female  HPI     This is a 61-year-old female  ( x2, with tubal ligation, twin gestation first delivery, SAB x4) presents for annual gyn exam   She is accompanied with her  today  Her baby is now 5month-old  She had her first menstrual cycle 2017 described as very heavy passing large clots with increase in pain  She was seen in the emergency room where her workup was essentially negative  She was discharged home with Megace x1 week  Her next menstrual cycle was November in thereafter every 3-4 weeks lasting 3-4 days described very heavy for 3 consecutive days  She denies any breakthrough bleeding  She also states that her periods are very painful where she is using Aleve and Tylenol on a monthly basis  She denies any changes in bowel or bladder function  Her gyn history is remarkable for history of endometriosis diagnosed at age 17  Patient states she has had a total of 7 laparoscopies with the last 2 in ,  with infertility  She states that she has had several ovarian cystectomies and laser fulguration of endometriosis  At age 25 she has been seen by Dr Sulaiman Carmichael for chronic pelvic pain/endometriosis  Treatment consisted of Lupron therapy for 1 year, bladder instillations, pelvic floor physical therapy, continuous OCPs  She states she had a difficult time tolerating Lupron with significant hot flashes and night sweats  She was diagnosed with interstitial cystitis where she did not require medication for the last 13 years  Patient denies any history of postpartum depression  She did have a difficult time balencing 1year-old twins with a  and going back to work full-time  Her  has been supportive  They do live with her parents who helps with  on a daily basis  She states her family doctor placed her on Lexapro and she has done markedly well  She has been able now to cope with full-time job in family  I did inquire why she did not contact me in the last 9 months  She stated she just wanted to follow up with her family physician as she felt that this was not postpartum depression      The following portions of the patient's history were reviewed and updated as appropriate: allergies, current medications, past family history, past medical history, past social history, past surgical history and problem list     Review of Systems   Constitutional: Negative for fatigue, fever and unexpected weight change  Respiratory: Negative for cough, chest tightness, shortness of breath and wheezing  Cardiovascular: Negative  Negative for chest pain and palpitations  Gastrointestinal: Negative  Negative for abdominal distention, abdominal pain, blood in stool, constipation, diarrhea, nausea and vomiting  Genitourinary: Positive for menstrual problem  Negative for difficulty urinating, dyspareunia, dysuria, flank pain, frequency, genital sores, hematuria, pelvic pain, urgency, vaginal bleeding, vaginal discharge and vaginal pain  Skin: Negative for rash  Objective:      /82   Ht 5' 5" (1 651 m)   Wt 73 9 kg (163 lb)   LMP 02/18/2018 (Exact Date)   Breastfeeding? No   BMI 27 12 kg/m²          Physical Exam   Constitutional: She appears well-developed and well-nourished  Cardiovascular: Normal rate and regular rhythm  Pulmonary/Chest: Effort normal and breath sounds normal  Right breast exhibits no inverted nipple, no mass, no nipple discharge, no skin change and no tenderness  Left breast exhibits no inverted nipple, no mass, no nipple discharge, no skin change and no tenderness  Abdominal: Soft  Bowel sounds are normal  She exhibits no distension  There is no tenderness  There is no rebound and no guarding  Genitourinary: Vagina normal  Uterus is fixed and tender  Cervix exhibits no discharge and no friability  Right adnexum displays no mass, no tenderness and no fullness  Left adnexum displays no mass, no tenderness and no fullness  No vaginal discharge found

## 2018-02-25 ENCOUNTER — HOSPITAL ENCOUNTER (OUTPATIENT)
Dept: RADIOLOGY | Facility: HOSPITAL | Age: 38
Discharge: HOME | End: 2018-02-25
Attending: OBSTETRICS & GYNECOLOGY
Payer: COMMERCIAL

## 2018-02-25 PROCEDURE — 76856 US EXAM PELVIC COMPLETE: CPT

## 2018-02-25 PROCEDURE — 76830 TRANSVAGINAL US NON-OB: CPT

## 2018-02-28 LAB
CLINICAL INFO: NORMAL
CYTO CVX: NORMAL
DATE PREVIOUS BX: NORMAL
HPV E6+E7 MRNA CVX QL NAA+PROBE: NOT DETECTED
LMP START DATE: NORMAL
SL AMB PREV. PAP:: NORMAL
SPECIMEN SOURCE CVX/VAG CYTO: NORMAL

## 2018-03-02 ENCOUNTER — TELEPHONE (OUTPATIENT)
Dept: OBGYN CLINIC | Facility: CLINIC | Age: 38
End: 2018-03-02

## 2018-03-02 NOTE — TELEPHONE ENCOUNTER
----- Message from Luanne Faulkner DO sent at 3/1/2018  4:43 PM EST -----  Inform pt pelvic US reveals sm ov cyst, smaller compared to last US, likely endometriosis  Pt wants to use lysteda to decrease menses   Please have her call with f/u after 1-2 cycles

## 2018-03-07 NOTE — PROGRESS NOTES
[employer] Education 3rd Trimester: Third Trimester Education provided:   benefits of breastfeeding, importance of exclusive breastfeeding, early initiation of breastfeeding, exclusive breastfeeding for the first 6 months, frequent feedings for optimal milk production, feeding on demand/baby-led feedings, effective positioning and attachment, importance of breastfeeding after 6 months following introduction of other foods, non-pharmacologic pain relief methods for labor, importance of early skin-to-skin contact and 28 week packet given  rooming-in on 24 hour basis   The patient is planning on breastfeeding  The patient is planning on exclusively breastfeeding until the baby is 10months of age  Mother has not registered for prenatal class  Thought has been given to selecting a pediatrician  The mother has not discussed personal preferences with her provider  Contraindication to breastfeeding identified: N/A  Individualized education given: yes     Prenatal education provided by: [address]   Electronically signed by : Bob Cassidy, ; Mar 17 2017  3:05PM EST                       (Author)

## 2018-03-08 ENCOUNTER — TELEPHONE (OUTPATIENT)
Dept: OBGYN CLINIC | Facility: CLINIC | Age: 38
End: 2018-03-08

## 2018-03-08 NOTE — TELEPHONE ENCOUNTER
----- Message from José Miguel Allan DO sent at 3/1/2018  4:43 PM EST -----  Inform pt pelvic US reveals sm ov cyst, smaller compared to last US, likely endometriosis  Pt wants to use lysteda to decrease menses   Please have her call with f/u after 1-2 cycles
Lm for pt to cb 
Pt informed of US results  Pt will try Lysteda and call back with Fu  All questions and concerns addressed  Pt verbalized understanding to teaching 
Daily Assessment

## 2018-03-08 NOTE — PROGRESS NOTES
Pt informed of US results  Will try Lysteda and call back with Fu  All questions and concerns discussed, pt verbalized understanding to teaching

## 2018-04-11 ENCOUNTER — TELEPHONE (OUTPATIENT)
Dept: OBGYN CLINIC | Facility: CLINIC | Age: 38
End: 2018-04-11

## 2018-04-11 NOTE — TELEPHONE ENCOUNTER
Pt took Lysteda 2 tabs bid x 2 days only last month - d/c since she was having back pain & headache with taking  This month took 1 tab tid x 3 days so far - still having cramping but no back pain or h/a - changing pad q 2 hrs - only minimal effect on flow (changing q 2 hrs today)  Will f/u with KA & recall pt

## 2018-04-12 NOTE — TELEPHONE ENCOUNTER
Phone call to patient  She is instructed to discontinue lysteda  Reviewed other options including Mirena IUD, OCPs, Depo-Provera  Patient had been on OCPs and Depo-Provera in the past   At this point she is amenable to the IUD  Risks and benefits reviewed  All questions answered  She will call the office to get this pre certified and ordered  She is aware would insert this on day 4 or 5 of her menstrual cycle  She will premedicate with Motrin as well as hydration

## 2018-04-23 ENCOUNTER — TELEPHONE (OUTPATIENT)
Dept: OBGYN CLINIC | Facility: CLINIC | Age: 38
End: 2018-04-23

## 2018-04-23 NOTE — TELEPHONE ENCOUNTER
Spoke with sarah that her iud was covered  Spoke with Stacy BETHEA ref# [id number] iud covered at 100% for medical reason n92 4   Patient met dectuible already,

## 2018-05-09 ENCOUNTER — PROCEDURE VISIT (OUTPATIENT)
Dept: OBGYN CLINIC | Facility: CLINIC | Age: 38
End: 2018-05-09
Payer: COMMERCIAL

## 2018-05-09 ENCOUNTER — TELEPHONE (OUTPATIENT)
Dept: OBGYN CLINIC | Facility: CLINIC | Age: 38
End: 2018-05-09

## 2018-05-09 VITALS
SYSTOLIC BLOOD PRESSURE: 110 MMHG | BODY MASS INDEX: 26.66 KG/M2 | HEIGHT: 65 IN | DIASTOLIC BLOOD PRESSURE: 70 MMHG | WEIGHT: 160 LBS

## 2018-05-09 DIAGNOSIS — Z30.430 ENCOUNTER FOR INSERTION OF MIRENA IUD: Primary | ICD-10-CM

## 2018-05-09 PROCEDURE — 58300 INSERT INTRAUTERINE DEVICE: CPT | Performed by: OBSTETRICS & GYNECOLOGY

## 2018-05-09 RX ORDER — ERGOCALCIFEROL 1.25 MG/1
CAPSULE ORAL
Refills: 0 | COMMUNITY
Start: 2018-04-06 | End: 2018-10-29 | Stop reason: ALTCHOICE

## 2018-05-09 NOTE — PROGRESS NOTES
Procedures     Patient was consented for insertion of Mirena IUD  Risks and benefits reviewed  All questions answered  Next the cervix was visualized cleansed with chlorhexidine solution according to office protocol for Betadine allergic patient's  Next the cervix was grasped using an Allis clamp  The uterus sounded to 10 cm  The cervical os was then dilated without difficulty  The Mirena IUD was inserted according to manufacture's recommendation  The IUD string was cut 1-2 cm from the external os  Next I performed a transvaginal ultrasound which had revealed proper location of the IUD  Patient tolerated the procedure well  She had her first menstrual cycle September 2017 described as very heavy passing large clots with increase in pain  She was seen in the emergency room where her workup was essentially negative  She was discharged home with Megace x1 week  Her next menstrual cycle was November in thereafter every 3-4 weeks lasting 3-4 days described very heavy for 3 consecutive days  She denies any breakthrough bleeding  She also states that her periods are very painful where she is using Aleve and Tylenol on a monthly basis  She denies any changes in bowel or bladder function  Her gyn history is remarkable for history of endometriosis diagnosed at age 17  Patient states she has had a total of 7 laparoscopies with the last 2 in 2012, 2014 with infertility  She states that she has had several ovarian cystectomies and laser fulguration of endometriosis  At age 25 she has been seen by Dr Wm Sr for chronic pelvic pain/endometriosis  Treatment consisted of Lupron therapy for 1 year, bladder instillations, pelvic floor physical therapy, continuous OCPs  She states she had a difficult time tolerating Lupron with significant hot flashes and night sweats  She was diagnosed with interstitial cystitis where she did not require medication for the last 13 years      Patient will return to office in 6 weeks follow-up IUD  She is aware over the next 4 months her menstrual cycles can be irregular  She will keep a menstrual diary

## 2018-07-09 ENCOUNTER — OFFICE VISIT (OUTPATIENT)
Dept: OBGYN CLINIC | Facility: CLINIC | Age: 38
End: 2018-07-09
Payer: COMMERCIAL

## 2018-07-09 VITALS
SYSTOLIC BLOOD PRESSURE: 102 MMHG | DIASTOLIC BLOOD PRESSURE: 74 MMHG | BODY MASS INDEX: 26.1 KG/M2 | HEIGHT: 66 IN | WEIGHT: 162.4 LBS

## 2018-07-09 DIAGNOSIS — R10.2 PELVIC PAIN: ICD-10-CM

## 2018-07-09 DIAGNOSIS — Z30.431 IUD CHECK UP: Primary | ICD-10-CM

## 2018-07-09 PROCEDURE — 99213 OFFICE O/P EST LOW 20 MIN: CPT | Performed by: OBSTETRICS & GYNECOLOGY

## 2018-07-09 NOTE — PROGRESS NOTES
Assessment/Plan:     Recommend pelvic ultrasound due to lower pelvic discomfort  She will keep a menstrual diary over the next 2-3 months and call with follow-up  Overall she is happy year with her menstrual flow and would like to maintain the IUD  Provided the above is normal she will return to office in February for annual gyn exam     Diagnoses and all orders for this visit:    IUD check up          Subjective:     Patient ID: Korina Cadena is a 30 y o  female  HPI     This is a 72-year-old female  ( x2 with tubal ligation, twin gestation, single x1) presents for follow-up Mirena IUD  She was complaining of very heavy menses passing large clots with increase in pain  She was counseled on treatment options and then desired IUD  She states the heavy flow has markedly improved where now she is spotting the first week after the IUD was inserted followed by the last 2 previous weeks  She has had some lower back discomfort and feels that she may be developing another ovarian cyst   She also has noticed a low-grade temperature of approximately 99° over the last several weeks  She denies any chills or sweats  Her gyn history is remarkable for endometriosis diagnosed at age 17  Patient states she has had a total of 7 laparoscopies with the last 2 in ,  with infertility  She states that she has had several ovarian cystectomies and laser fulguration of endometriosis  At age 25 she has been seen by Dr Rick Redman for chronic pelvic pain/endometriosis  Treatment consisted of Lupron therapy for 1 year, bladder instillations, pelvic floor physical therapy, continuous OCPs  She states she had a difficult time tolerating Lupron with significant hot flashes and night sweats  She was diagnosed with interstitial cystitis where she did not require medication for the last 13 years  She did have a difficult time balencing 1year-old twins with a  and going back to work full-time    Her  has been supportive  They do live with her parents who helps with  on a daily basis  She states her family doctor placed her on Lexapro and she has done markedly well  She has been able now to cope with full-time job in family  I did inquire why she did not contact me in the last 9 months  She stated she just wanted to follow up with her family physician as she felt that this was not postpartum depression  Review of Systems   Constitutional: Negative for fatigue, fever and unexpected weight change  Respiratory: Negative for cough, chest tightness, shortness of breath and wheezing  Cardiovascular: Negative  Negative for chest pain and palpitations  Gastrointestinal: Negative  Negative for abdominal distention, abdominal pain, blood in stool, constipation, diarrhea, nausea and vomiting  Genitourinary: Positive for pelvic pain  Negative for difficulty urinating, dyspareunia, dysuria, flank pain, frequency, genital sores, hematuria, urgency, vaginal bleeding, vaginal discharge and vaginal pain  Skin: Negative for rash  Objective:     Physical Exam      Abdomen is soft nontender nondistended  Pelvic exam external genitalia is within normal limits  The vagina is well estrogenized  Cervix is small the os is closed  The IUD string is visualized today  Uterus is globular top-normal size, nontender with no palpable adnexal masses, she is slightly tender in the left adnexa

## 2018-07-16 DIAGNOSIS — F32.A ANXIETY AND DEPRESSION: ICD-10-CM

## 2018-07-16 DIAGNOSIS — F41.9 ANXIETY AND DEPRESSION: ICD-10-CM

## 2018-07-16 RX ORDER — ESCITALOPRAM OXALATE 10 MG/1
10 TABLET ORAL DAILY
Qty: 30 TABLET | Refills: 2 | Status: SHIPPED | OUTPATIENT
Start: 2018-07-16 | End: 2018-10-18 | Stop reason: SDUPTHER

## 2018-08-04 ENCOUNTER — HOSPITAL ENCOUNTER (OUTPATIENT)
Dept: RADIOLOGY | Facility: HOSPITAL | Age: 38
Discharge: HOME | End: 2018-08-04
Attending: OBSTETRICS & GYNECOLOGY
Payer: COMMERCIAL

## 2018-08-04 DIAGNOSIS — R10.2 PELVIC PAIN: ICD-10-CM

## 2018-08-04 PROCEDURE — 76856 US EXAM PELVIC COMPLETE: CPT

## 2018-08-04 PROCEDURE — 76830 TRANSVAGINAL US NON-OB: CPT

## 2018-08-08 ENCOUNTER — TELEPHONE (OUTPATIENT)
Dept: OBGYN CLINIC | Facility: CLINIC | Age: 38
End: 2018-08-08

## 2018-08-08 DIAGNOSIS — N83.201 CYST OF RIGHT OVARY: Primary | ICD-10-CM

## 2018-08-08 NOTE — TELEPHONE ENCOUNTER
----- Message from Onelia Stafford DO sent at 8/8/2018  7:52 AM EDT -----  Informed patient pelvic ultrasound reveals persistent unchanged left ovarian cyst consistent with endometriosis    Simple right ovarian cyst noted recommend repeat ultrasound 3 months then office visit follow-up

## 2018-08-08 NOTE — TELEPHONE ENCOUNTER
Pt informed of pelvic u/s results & recom f/u - she will schedule appt time for repeat pelvic u/s for 11/2018 then f/u appt  Rad order in Epic

## 2018-10-18 DIAGNOSIS — F32.A ANXIETY AND DEPRESSION: ICD-10-CM

## 2018-10-18 DIAGNOSIS — F41.9 ANXIETY AND DEPRESSION: ICD-10-CM

## 2018-10-19 RX ORDER — ESCITALOPRAM OXALATE 10 MG/1
10 TABLET ORAL DAILY
Qty: 30 TABLET | Refills: 5 | Status: SHIPPED | OUTPATIENT
Start: 2018-10-19 | End: 2018-10-29 | Stop reason: SDUPTHER

## 2018-10-29 ENCOUNTER — OFFICE VISIT (OUTPATIENT)
Dept: FAMILY MEDICINE CLINIC | Facility: CLINIC | Age: 38
End: 2018-10-29
Payer: COMMERCIAL

## 2018-10-29 VITALS
HEART RATE: 80 BPM | WEIGHT: 167 LBS | BODY MASS INDEX: 27.82 KG/M2 | RESPIRATION RATE: 16 BRPM | HEIGHT: 65 IN | SYSTOLIC BLOOD PRESSURE: 110 MMHG | TEMPERATURE: 97.5 F | DIASTOLIC BLOOD PRESSURE: 80 MMHG

## 2018-10-29 DIAGNOSIS — J32.9 RECURRENT SINUS INFECTIONS: ICD-10-CM

## 2018-10-29 DIAGNOSIS — Z23 NEED FOR INFLUENZA VACCINATION: Primary | ICD-10-CM

## 2018-10-29 DIAGNOSIS — F32.A ANXIETY AND DEPRESSION: ICD-10-CM

## 2018-10-29 DIAGNOSIS — L03.116 CELLULITIS OF LEFT LOWER EXTREMITY: ICD-10-CM

## 2018-10-29 DIAGNOSIS — R53.83 OTHER FATIGUE: ICD-10-CM

## 2018-10-29 DIAGNOSIS — F41.9 ANXIETY AND DEPRESSION: ICD-10-CM

## 2018-10-29 PROCEDURE — 90686 IIV4 VACC NO PRSV 0.5 ML IM: CPT

## 2018-10-29 PROCEDURE — 90471 IMMUNIZATION ADMIN: CPT

## 2018-10-29 PROCEDURE — 99214 OFFICE O/P EST MOD 30 MIN: CPT | Performed by: FAMILY MEDICINE

## 2018-10-29 PROCEDURE — 1036F TOBACCO NON-USER: CPT | Performed by: FAMILY MEDICINE

## 2018-10-29 PROCEDURE — 3008F BODY MASS INDEX DOCD: CPT | Performed by: FAMILY MEDICINE

## 2018-10-29 RX ORDER — ESCITALOPRAM OXALATE 5 MG/1
TABLET ORAL
Qty: 30 TABLET | Refills: 2 | Status: SHIPPED | OUTPATIENT
Start: 2018-10-29 | End: 2019-01-31 | Stop reason: SDUPTHER

## 2018-10-29 RX ORDER — CETIRIZINE HYDROCHLORIDE 10 MG/1
10 TABLET ORAL
COMMUNITY
End: 2022-04-19 | Stop reason: ALTCHOICE

## 2018-10-29 RX ORDER — DOXYCYCLINE HYCLATE 100 MG/1
100 CAPSULE ORAL
Qty: 28 CAPSULE | Refills: 0 | Status: SHIPPED | OUTPATIENT
Start: 2018-10-29 | End: 2018-11-12

## 2018-10-29 RX ORDER — FLUTICASONE PROPIONATE 50 MCG
2 SPRAY, SUSPENSION (ML) NASAL DAILY
Qty: 1 BOTTLE | Refills: 0 | Status: SHIPPED | OUTPATIENT
Start: 2018-10-29

## 2018-10-29 NOTE — PROGRESS NOTES
FAMILY PRACTICE OFFICE VISIT       NAME: Brock Fink  AGE: 41 y o  SEX: female       : 1980        MRN: [id number]        Assessment and Plan     Problem List Items Addressed This Visit     Anxiety and depression    Relevant Medications    escitalopram (LEXAPRO) 5 mg tablet      Other Visit Diagnoses     Cellulitis of left lower extremity    -  Primary    Relevant Medications    mupirocin (BACTROBAN) 2 % ointment    doxycycline hyclate (VIBRAMYCIN) 100 mg capsule    Recurrent sinus infections        Relevant Medications    fluticasone (FLONASE) 50 mcg/act nasal spray    doxycycline hyclate (VIBRAMYCIN) 100 mg capsule    Other Relevant Orders    CT sinus wo contrast    Other fatigue        Relevant Orders    CBC    Comprehensive metabolic panel    TSH, 3rd generation       Patient presents for evaluation of recurrent sinusitis, chronic  fatigue, cellulitis left lower extremity and generalized anxiety disorder/depression  She has been experiencing daily nasal congestion requiring Sudafed, Zyrtec and occasional Aleve  Patient was treated with antibiotic therapy on multiple occasions  Patient states that she always feels relieved after antibiotic therapy is completed, then symptoms trying to recur  She did have sinus surgery at age of 25  No recent ENT re-evaluation or imaging  I advised patient to schedule CT sinuses  Will treat her with doxycycline 100 mg twice a day for 2 weeks  This antibiotic should provide substantial relief for mild skin infection of left lower extremity  Patient will also use topical Bactroban ointment to the left calf wound  Patient will schedule CBC, CMP and TSH  Flu vaccine was administered today  Avoidance of daily decongestant use and energy drinks emphasized  Generalized anxiety disorder:  Patient will decrease dose of Lexapro to 5 mg once a day  I would like her to stay on this dose for at least a month and observe her symptoms    As long as she is feeling well she may decrease dose further to 5 mg every other day for 2-3 weeks and then stop medication altogether  If symptoms of anxiety or depression will be recurring-she will contact me immediately  Patient Instructions   Please decrease dose of Lexapro to 5 mg once a day for 30 days  As long as you feeling good, you may start using 1 tablet of 5 mg every other day for 2-3 weeks and then stop it             Chief Complaint     Chief Complaint   Patient presents with    Verrucous Vulgaris     Patient has a wart removed on her left leg x 1 week and the area is infected   Follow-up     Patient would like to discuss her medications  History of Present Illness     Patient presents for evaluation of few concerns  Left  Calf Skin lesion was removed a week ago by dermatology -wound is  painful, very sensitive to touch, surrounding erythema, cellulitic change  Patient has been using Neosporin as directed  No drainage  Patient is complaining of fatigue, chronic sinus headaches  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  Patient consumes at least 2 energy drinks per day and uses Sudafed almost on a daily basis to relieve chronic sinus congestion and headache  She takes Aleve occasionally if needed  Chronic green nasal discharge, postnasal drip  Patient remains on Zyrtec at bedtime but has not been using Flonase lately  Patient has been using Lexapro 10 mg once a day for almost a year  Medication was prescribed for symptoms of depression and anxiety  Patient states that she is feeling significantly better  Patient believes that her symptoms were significantly triggered by PMS, she is currently on Mirena and would like to wean off Lexapro  Review of Systems   Review of Systems   Constitutional: Positive for fatigue  Negative for fever  HENT: Positive for congestion, ear pain (Chronic left-sided ear discomfort), postnasal drip, sinus pain and sinus pressure  Eyes: Negative      Respiratory: Negative  Cardiovascular: Negative  Gastrointestinal: Negative  Skin:        As outlined in HPI   Neurological: Positive for headaches     Psychiatric/Behavioral:        As outlined in HPI       Active Problem List     Patient Active Problem List   Diagnosis    Menorrhagia with regular cycle    Endometriosis determined by laparoscopy    Allergic rhinitis    Anxiety and depression       Past Medical History:  Past Medical History:   Diagnosis Date    Chronic kidney disease     [id number]    Diabetes mellitus ([address] )     gd last preg    Disease of thyroid gland     nodule    Endometriosis     Female infertility     iui with prior preg    Generalized anxiety disorder     last assessed 14    Renal calculi     last assessed 10/20/14; US 10/2014 3mm and 4mm right, non obstructing    Varicella     childhood       Past Surgical History:  Past Surgical History:   Procedure Laterality Date    APPENDECTOMY       SECTION      DIAGNOSTIC LAPAROSCOPY      multiple, last 10/2012, 10/2014    LAPAROSCOPIC OVARIAN CYSTECTOMY      LASER LAPAROSCOPY      NASAL SEPTUM SURGERY      ME  DELIVERY ONLY N/A 2017    Procedure:  SECTION () REPEAT;  Surgeon: Pilar Hilario DO;  [facility]: [facility] ;  Service: Obstetrics    ME Juliene Marie TUBE W/ Bilateral 2017    Procedure: LIGATION/COAGULATION TUBAL;  Surgeon: Pilar Hilario DO;  Location: [facility] ;  Service: Obstetrics       Family History:  Family History   Problem Relation Age of Onset    Depression Mother     Hypertension Mother     Miscarriages / [place] Mother     Anxiety disorder Mother         NOS    Heart defect Mother         aortic valve disorder    Endometriosis Mother     Cancer Sister         breast    Alcohol abuse Maternal Uncle     Cancer Maternal Grandmother         colon    Depression Maternal Grandmother     Dementia Maternal Grandmother     Vision loss Maternal Grandmother     Arthritis Paternal Grandmother     Hearing loss Paternal Grandmother     Heart disease Paternal Grandmother     Stroke Paternal Grandmother    Yuki Sohan / Stillbirths Paternal Grandmother     Colon cancer Paternal [de-identified]     Hyperlipidemia Father         high cholesterol    Endometriosis Sister        Social History:  Social History     Social History    Marital status: /Civil Union     Spouse name: N/A    Number of children: N/A    Years of education: N/A     Occupational History    Not on file  Social History Main Topics    Smoking status: Former Smoker    Smokeless tobacco: Never Used      Comment: Current smoker-quit 2 weeks agao, when found pregnant, smoked half pay daily, as per [employer]    Alcohol use No    Drug use: No    Sexual activity: Yes     Partners: Male     Birth control/ protection: Female Sterilization     Other Topics Concern    Not on file     Social History Narrative    No narrative on file       Objective     Vitals:    10/29/18 1501   BP: 110/80   Pulse: 80   Resp: 16   Temp: 97 5 °F (36 4 °C)   TempSrc: Tympanic   Weight: 75 8 kg (167 lb)   Height: 5' 5" (1 651 m)     Wt Readings from Last 3 Encounters:   10/29/18 75 8 kg (167 lb)   07/09/18 73 7 kg (162 lb 6 4 oz)   05/09/18 72 6 kg (160 lb)       Physical Exam   Constitutional: She is oriented to person, place, and time  She appears well-developed and well-nourished  HENT:   Head: Normocephalic and atraumatic  Right Ear: Ear canal normal    Left Ear: Ear canal normal    Nose: Mucosal edema present  Mouth/Throat: Posterior oropharyngeal erythema present  No oropharyngeal exudate  Green nasal secretions   Left TM-  Serous effusion,  Decreased  Light  Reflex  Right TM-clear   Eyes: Conjunctivae are normal    Neck: Neck supple  Cardiovascular: Normal rate, regular rhythm and normal heart sounds  Pulmonary/Chest: Effort normal and breath sounds normal  No respiratory distress  She has no wheezes  She has no rales  Lymphadenopathy:     She has cervical adenopathy (submandibular)  Neurological: She is alert and oriented to person, place, and time  She has normal reflexes  No cranial nerve deficit  Skin:   Left mid calf:  Healing 0 6 cm punch biopsy, normal granulation tissue, surrounded by erythema, edema, total radius is approximately 2-3 cm, warm, sensitive to touch     Psychiatric: She has a normal mood and affect  Her behavior is normal    Nursing note and vitals reviewed  Pertinent Laboratory/Diagnostic Studies:  Lab Results   Component Value Date    GLUCOSE 104 08/15/2015    BUN 10 01/24/2018    CREATININE 0 66 01/24/2018    CALCIUM 9 0 01/24/2018     01/24/2018    K 3 6 01/24/2018    CO2 25 01/24/2018     01/24/2018     Lab Results   Component Value Date    ALT 19 01/24/2018    AST 12 01/24/2018    ALKPHOS 108 01/24/2018    BILITOT 0 30 08/15/2015       Lab Results   Component Value Date    WBC 7 06 01/24/2018    HGB 12 9 01/24/2018    HCT 39 4 01/24/2018    MCV 80 (L) 01/24/2018     01/24/2018       No results found for: TSH    No results found for: CHOL  No results found for: TRIG  No results found for: HDL  No results found for: [facility]  Lab Results   Component Value Date    HGBA1C 5 2 08/15/2015       Results for orders placed or performed in visit on 02/21/18   Thinprep Pap and HPV mRNA E6/E7   Result Value Ref Range    SL AMB CLINICAL INFORMATION None given     SL AMB LMP: 49,085,004     SL AMB PREV  PAP: NONE GIVEN     SL AMB PREV  BX: NONE GIVEN     Source Cervix     SL AMB STATEMENT OF ADEQUACY:      SL AMB INTERPRETATION/RESULT: Negative for intraepithelial lesion or malignancy       SL AMB CYTOTECHNOLOGIST:       AMB REVIEW CYTOTECHNOLOGIST:      SHELL COON PATHOLOGIST:      HPV mRNA E6/E7 Not Detected Not Detected       Orders Placed This Encounter   Procedures    CT sinus wo contrast    CBC    Comprehensive metabolic panel    TSH, 3rd generation       ALLERGIES:  Allergies   Allergen Reactions    Cefprozil Shortness Of Breath    Amoxicillin      Thrush,yeast infection    Pertussis Vaccine     Shellfish-Derived Products Hives       Current Medications     Current Outpatient Prescriptions   Medication Sig Dispense Refill    acetaminophen (TYLENOL) 500 mg tablet Take by mouth      cetirizine (ZyrTEC) 10 mg tablet Take 10 mg by mouth daily      escitalopram (LEXAPRO) 5 mg tablet take 1 tablet once a day 30 tablet 2    pseudoephedrine (SUDAFED) 30 mg tablet Take by mouth      doxycycline hyclate (VIBRAMYCIN) 100 mg capsule Take 1 capsule (100 mg total) by mouth 2 (two) times daily after meals for 14 days 28 capsule 0    fluticasone (FLONASE) 50 mcg/act nasal spray 2 sprays into each nostril daily 1 Bottle 0    mupirocin (BACTROBAN) 2 % ointment Apply topically 3 (three) times a day 22 g 1     No current facility-administered medications for this visit            Health Maintenance     Health Maintenance   Topic Date Due    INFLUENZA VACCINE  07/01/2018    DTaP,Tdap,and Td Vaccines (6 - Tdap) 08/29/2018    Depression Screening PHQ  02/21/2019     Immunization History   Administered Date(s) Administered    DT (pediatric) 07/16/1984, 10/11/1993    DTaP 5 1980, 1980, 1980, 03/28/1983, 08/29/2008    IPV 01/03/1983, 01/28/1983, 04/25/1983, 07/16/1984, 10/01/1987    Influenza Quadrivalent Preservative Free 3 years and older IM 10/10/2015, 10/08/2016    Influenza, Quadrivalent (nasal) 10/10/2015, 10/08/2016    MMR 11/23/1983, 10/11/1993    Meningococcal, Unknown Serogroups 08/02/2000, 08/29/2008    Mumps 08/22/1983    Rubella 11/23/1983, 09/21/1984    Tuberculin Skin Test-PPD Intradermal 02/06/2012       Doreen Bravo MD

## 2018-10-29 NOTE — PATIENT INSTRUCTIONS
Please decrease dose of Lexapro to 5 mg once a day for 30 days  As long as you feeling good, you may start using 1 tablet of 5 mg every other day for 2-3 weeks and then stop it

## 2018-11-11 ENCOUNTER — HOSPITAL ENCOUNTER (OUTPATIENT)
Dept: CT IMAGING | Facility: HOSPITAL | Age: 38
Discharge: HOME | End: 2018-11-11
Payer: COMMERCIAL

## 2018-11-11 DIAGNOSIS — J32.9 RECURRENT SINUS INFECTIONS: ICD-10-CM

## 2018-11-11 PROCEDURE — 70486 CT MAXILLOFACIAL W/O DYE: CPT

## 2018-11-12 ENCOUNTER — APPOINTMENT (OUTPATIENT)
Dept: LAB | Facility: CLINIC | Age: 38
End: 2018-11-12
Payer: COMMERCIAL

## 2018-11-12 ENCOUNTER — TRANSCRIBE ORDERS (OUTPATIENT)
Dept: LAB | Facility: CLINIC | Age: 38
End: 2018-11-12

## 2018-11-12 DIAGNOSIS — R53.83 OTHER FATIGUE: ICD-10-CM

## 2018-11-12 LAB
ALBUMIN SERPL BCP-MCNC: 3.8 G/DL (ref 3.5–5)
ALP SERPL-CCNC: 120 U/L (ref 46–116)
ALT SERPL W P-5'-P-CCNC: 18 U/L (ref 12–78)
ANION GAP SERPL CALCULATED.3IONS-SCNC: 4 MMOL/L (ref 4–13)
AST SERPL W P-5'-P-CCNC: 12 U/L (ref 5–45)
BILIRUB SERPL-MCNC: 0.79 MG/DL (ref 0.2–1)
BUN SERPL-MCNC: 9 MG/DL (ref 5–25)
CALCIUM SERPL-MCNC: 8.5 MG/DL (ref 8.3–10.1)
CHLORIDE SERPL-SCNC: 103 MMOL/L (ref 100–108)
CO2 SERPL-SCNC: 29 MMOL/L (ref 21–32)
CREAT SERPL-MCNC: 0.65 MG/DL (ref 0.6–1.3)
ERYTHROCYTE [DISTWIDTH] IN BLOOD BY AUTOMATED COUNT: 14.5 % (ref 11.6–15.1)
GFR SERPL CREATININE-BSD FRML MDRD: 115 ML/MIN/1.73SQ M
GLUCOSE P FAST SERPL-MCNC: 97 MG/DL (ref 65–99)
HCT VFR BLD AUTO: 42.2 % (ref 34.8–46.1)
HGB BLD-MCNC: 13.4 G/DL (ref 11.5–15.4)
MCH RBC QN AUTO: 26.5 PG (ref 26.8–34.3)
MCHC RBC AUTO-ENTMCNC: 31.8 G/DL (ref 31.4–37.4)
MCV RBC AUTO: 84 FL (ref 82–98)
PLATELET # BLD AUTO: 298 THOUSANDS/UL (ref 149–390)
PMV BLD AUTO: 11.4 FL (ref 8.9–12.7)
POTASSIUM SERPL-SCNC: 4.1 MMOL/L (ref 3.5–5.3)
PROT SERPL-MCNC: 7.1 G/DL (ref 6.4–8.2)
RBC # BLD AUTO: 5.05 MILLION/UL (ref 3.81–5.12)
SODIUM SERPL-SCNC: 136 MMOL/L (ref 136–145)
TSH SERPL DL<=0.05 MIU/L-ACNC: 0.89 UIU/ML (ref 0.36–3.74)
WBC # BLD AUTO: 6.73 THOUSAND/UL (ref 4.31–10.16)

## 2018-11-12 PROCEDURE — 85027 COMPLETE CBC AUTOMATED: CPT

## 2018-11-12 PROCEDURE — 80053 COMPREHEN METABOLIC PANEL: CPT

## 2018-11-12 PROCEDURE — 36415 COLL VENOUS BLD VENIPUNCTURE: CPT

## 2018-11-12 PROCEDURE — 84443 ASSAY THYROID STIM HORMONE: CPT

## 2018-11-15 ENCOUNTER — TELEPHONE (OUTPATIENT)
Dept: FAMILY MEDICINE CLINIC | Facility: CLINIC | Age: 38
End: 2018-11-15

## 2018-11-15 DIAGNOSIS — R74.8 ELEVATED ALKALINE PHOSPHATASE LEVEL: Primary | ICD-10-CM

## 2018-11-15 NOTE — TELEPHONE ENCOUNTER
Please contact patient regarding results of CT sinuses and blood work  CT sinuses does not reveal any obstruction  It does indicate cyst/polyp in the left maxillary (cheek bone) sinus area  Blood work is all normal aside from minimal elevation of 1 of liver function tests, alkaline phosphatase  It could be related to chronic vitamin-D deficiency    Please make sure that patient takes her vitamin D3 every day as directed at least 2000 units once a day  We need to repeat liver function tests along with vitamin-D level in 4-6 weeks  I will place new orders  I would recommend re-evaluation by ENT    Thank you

## 2019-01-31 DIAGNOSIS — F32.A ANXIETY AND DEPRESSION: ICD-10-CM

## 2019-01-31 DIAGNOSIS — F41.9 ANXIETY AND DEPRESSION: ICD-10-CM

## 2019-02-01 RX ORDER — ESCITALOPRAM OXALATE 5 MG/1
TABLET ORAL
Qty: 30 TABLET | Refills: 1 | Status: SHIPPED | OUTPATIENT
Start: 2019-02-01 | End: 2019-04-13 | Stop reason: SDUPTHER

## 2019-02-22 ENCOUNTER — ANNUAL EXAM (OUTPATIENT)
Dept: OBGYN CLINIC | Facility: CLINIC | Age: 39
End: 2019-02-22
Payer: COMMERCIAL

## 2019-02-22 VITALS
WEIGHT: 168.4 LBS | DIASTOLIC BLOOD PRESSURE: 80 MMHG | BODY MASS INDEX: 27.06 KG/M2 | HEIGHT: 66 IN | SYSTOLIC BLOOD PRESSURE: 120 MMHG

## 2019-02-22 DIAGNOSIS — Z01.419 WOMEN'S ANNUAL ROUTINE GYNECOLOGICAL EXAMINATION: Primary | ICD-10-CM

## 2019-02-22 PROBLEM — N92.0 MENORRHAGIA WITH REGULAR CYCLE: Status: RESOLVED | Noted: 2018-02-21 | Resolved: 2019-02-22

## 2019-02-22 PROCEDURE — S0612 ANNUAL GYNECOLOGICAL EXAMINA: HCPCS | Performed by: OBSTETRICS & GYNECOLOGY

## 2019-02-22 NOTE — PATIENT INSTRUCTIONS
The patient was informed of a stable gyn examination  I suspect she might have some discomfort for endometriosis on her right adnexa  This is not interfering with her lifestyle will watch and observe  She may need an ultrasound future if this flares up

## 2019-02-22 NOTE — PROGRESS NOTES
HPI    this is a 82-year-old white female, she is a  6 para 2 with 1  section for twins, followed by 1  section at term  Her current method of contraception includes tubal ligation  She has a history of abnormal uterine bleeding for which an intrauterine uterine device was placed this is work well  Her bleeding pattern is much improved  She has a history of endometriosis with multiple laparoscopies in the past   This seems to be a under control  She has occasional discomfort on the right side  This is not interfering with her lifestyle  She denies any major  GI complaint  There is no problem with intimacy  She is being treated for anxiety/depression this is working well  Her Lexapro dose is being reduced  She is not happy with her weight  She sees a dentist on a regular basis  There are no new major family illnesses report at this time  REVIEW OF SYSTEMS   slight irregular discomfort on her right side of comes and goes not interfering with her lifestyle  This is consistent with her history of endometriosis  PAST MEDICAL HISTORY significant for endometriosis, history of interstitial cystitis which is resolved, generalized anxiety disorder, history of kidney stones        PAST SURGICAL HISTORY  significant for  section, multiple laparoscopies, history of sinus surgery      FAMILY HISTORY  significant for anxiety disorder, heart disease, elevated cholesterol, breast cancer      SOCIAL HISTORY  former smoker positive for social alcohol      PHYSICAL EXAM    this is a well-developed well-nourished white female , she is in no acute distress  Her BMI is 27 6  Her HEENT is was within normal limits her neck is supple no masses  Breast exam symmetrical nontender no retraction or dimpling, the axilla clear bilaterally  Cardiac exam shows a regular rhythm and rate normal S1-S2  Her her abdomen is softer no masses positive bowel sound    There is evidence of 2 prior  section scars x2 well-healed  Pelvic exam the external genitalia normal limits the vagina is clean the cervix is closed  The IUD string was seen  Uterus is anterior normal size there is no cervical motion tenderness  She did experience slight discomfort on the right lower quadrant right adnexa  I suspect this is remnants of endometriosis  Is not interfering with her lifestyle will probably watch and observe  She has had 7 laparoscopies in the past method will be some scar tissue there  A Pap smear was not performed  IMPRESSION    the patient was informed of a stable gyn examination  She does have a history of endometriosis  I would still remnant that present in the right lower quadrant  She will keep me informed of her progress  If this flares that we may consider another ultrasound  In meantime will watch and observe  The IUD string is seen  No Pap smear was performed  She will return to office in 1 year  She has tried exercise lose more weight  She is not smoking now

## 2019-04-13 DIAGNOSIS — F41.9 ANXIETY AND DEPRESSION: ICD-10-CM

## 2019-04-13 DIAGNOSIS — F32.A ANXIETY AND DEPRESSION: ICD-10-CM

## 2019-04-14 RX ORDER — ESCITALOPRAM OXALATE 5 MG/1
TABLET ORAL
Qty: 30 TABLET | Refills: 1 | Status: SHIPPED | OUTPATIENT
Start: 2019-04-14 | End: 2019-06-04

## 2019-04-16 ENCOUNTER — OFFICE VISIT (OUTPATIENT)
Dept: FAMILY MEDICINE CLINIC | Facility: CLINIC | Age: 39
End: 2019-04-16
Payer: COMMERCIAL

## 2019-04-16 VITALS
SYSTOLIC BLOOD PRESSURE: 122 MMHG | WEIGHT: 164.2 LBS | HEIGHT: 66 IN | BODY MASS INDEX: 26.39 KG/M2 | DIASTOLIC BLOOD PRESSURE: 70 MMHG | HEART RATE: 80 BPM | TEMPERATURE: 98 F | RESPIRATION RATE: 16 BRPM

## 2019-04-16 DIAGNOSIS — J01.91 ACUTE RECURRENT SINUSITIS, UNSPECIFIED LOCATION: Primary | ICD-10-CM

## 2019-04-16 PROCEDURE — 99213 OFFICE O/P EST LOW 20 MIN: CPT | Performed by: FAMILY MEDICINE

## 2019-04-16 RX ORDER — METHYLPREDNISOLONE 4 MG/1
TABLET ORAL
Qty: 21 TABLET | Refills: 0 | Status: SHIPPED | OUTPATIENT
Start: 2019-04-16 | End: 2019-06-04

## 2019-04-16 RX ORDER — AZITHROMYCIN 250 MG/1
TABLET, FILM COATED ORAL
Qty: 6 TABLET | Refills: 0 | Status: SHIPPED | OUTPATIENT
Start: 2019-04-16 | End: 2019-04-20

## 2019-04-16 RX ORDER — LORATADINE 10 MG/1
10 TABLET ORAL DAILY
COMMUNITY
End: 2020-01-16 | Stop reason: ALTCHOICE

## 2019-06-03 ENCOUNTER — TELEPHONE (OUTPATIENT)
Dept: FAMILY MEDICINE CLINIC | Facility: CLINIC | Age: 39
End: 2019-06-03

## 2019-06-04 ENCOUNTER — OFFICE VISIT (OUTPATIENT)
Dept: FAMILY MEDICINE CLINIC | Facility: CLINIC | Age: 39
End: 2019-06-04
Payer: COMMERCIAL

## 2019-06-04 VITALS
HEIGHT: 66 IN | TEMPERATURE: 98.1 F | SYSTOLIC BLOOD PRESSURE: 130 MMHG | RESPIRATION RATE: 16 BRPM | DIASTOLIC BLOOD PRESSURE: 84 MMHG | WEIGHT: 163.2 LBS | OXYGEN SATURATION: 97 % | BODY MASS INDEX: 26.23 KG/M2 | HEART RATE: 87 BPM

## 2019-06-04 DIAGNOSIS — E55.9 VITAMIN D DEFICIENCY: ICD-10-CM

## 2019-06-04 DIAGNOSIS — K29.90 GASTRODUODENITIS: Primary | ICD-10-CM

## 2019-06-04 DIAGNOSIS — F41.9 ANXIETY AND DEPRESSION: ICD-10-CM

## 2019-06-04 DIAGNOSIS — F32.A ANXIETY AND DEPRESSION: ICD-10-CM

## 2019-06-04 PROCEDURE — 99214 OFFICE O/P EST MOD 30 MIN: CPT | Performed by: FAMILY MEDICINE

## 2019-06-04 PROCEDURE — 3008F BODY MASS INDEX DOCD: CPT | Performed by: FAMILY MEDICINE

## 2019-06-04 RX ORDER — PANTOPRAZOLE SODIUM 40 MG/1
40 TABLET, DELAYED RELEASE ORAL
Qty: 30 TABLET | Refills: 5 | Status: SHIPPED | OUTPATIENT
Start: 2019-06-04 | End: 2019-09-13

## 2019-06-04 RX ORDER — ESCITALOPRAM OXALATE 10 MG/1
TABLET ORAL
Qty: 30 TABLET | Refills: 3 | Status: SHIPPED | OUTPATIENT
Start: 2019-06-04 | End: 2020-04-29

## 2019-06-08 ENCOUNTER — APPOINTMENT (OUTPATIENT)
Dept: LAB | Facility: CLINIC | Age: 39
End: 2019-06-08
Payer: COMMERCIAL

## 2019-06-08 DIAGNOSIS — E55.9 VITAMIN D DEFICIENCY: ICD-10-CM

## 2019-06-08 DIAGNOSIS — R74.8 ELEVATED ALKALINE PHOSPHATASE LEVEL: ICD-10-CM

## 2019-06-08 DIAGNOSIS — K29.90 GASTRODUODENITIS: ICD-10-CM

## 2019-06-08 LAB
25(OH)D3 SERPL-MCNC: 14.7 NG/ML (ref 30–100)
ALBUMIN SERPL BCP-MCNC: 4.1 G/DL (ref 3.5–5)
ALP SERPL-CCNC: 105 U/L (ref 46–116)
ALT SERPL W P-5'-P-CCNC: 15 U/L (ref 12–78)
ANION GAP SERPL CALCULATED.3IONS-SCNC: 5 MMOL/L (ref 4–13)
AST SERPL W P-5'-P-CCNC: 8 U/L (ref 5–45)
BASOPHILS # BLD AUTO: 0.03 THOUSANDS/ΜL (ref 0–0.1)
BASOPHILS NFR BLD AUTO: 0 % (ref 0–1)
BILIRUB DIRECT SERPL-MCNC: 0.14 MG/DL (ref 0–0.2)
BILIRUB SERPL-MCNC: 0.69 MG/DL (ref 0.2–1)
BUN SERPL-MCNC: 9 MG/DL (ref 5–25)
CALCIUM SERPL-MCNC: 8.8 MG/DL (ref 8.3–10.1)
CHLORIDE SERPL-SCNC: 108 MMOL/L (ref 100–108)
CO2 SERPL-SCNC: 30 MMOL/L (ref 21–32)
CREAT SERPL-MCNC: 0.64 MG/DL (ref 0.6–1.3)
EOSINOPHIL # BLD AUTO: 0.09 THOUSAND/ΜL (ref 0–0.61)
EOSINOPHIL NFR BLD AUTO: 1 % (ref 0–6)
ERYTHROCYTE [DISTWIDTH] IN BLOOD BY AUTOMATED COUNT: 13.7 % (ref 11.6–15.1)
GFR SERPL CREATININE-BSD FRML MDRD: 115 ML/MIN/1.73SQ M
GLUCOSE P FAST SERPL-MCNC: 100 MG/DL (ref 65–99)
HCT VFR BLD AUTO: 40.1 % (ref 34.8–46.1)
HGB BLD-MCNC: 13.3 G/DL (ref 11.5–15.4)
IMM GRANULOCYTES # BLD AUTO: 0.01 THOUSAND/UL (ref 0–0.2)
IMM GRANULOCYTES NFR BLD AUTO: 0 % (ref 0–2)
LIPASE SERPL-CCNC: 102 U/L (ref 73–393)
LYMPHOCYTES # BLD AUTO: 2.4 THOUSANDS/ΜL (ref 0.6–4.47)
LYMPHOCYTES NFR BLD AUTO: 30 % (ref 14–44)
MCH RBC QN AUTO: 28.1 PG (ref 26.8–34.3)
MCHC RBC AUTO-ENTMCNC: 33.2 G/DL (ref 31.4–37.4)
MCV RBC AUTO: 85 FL (ref 82–98)
MONOCYTES # BLD AUTO: 0.38 THOUSAND/ΜL (ref 0.17–1.22)
MONOCYTES NFR BLD AUTO: 5 % (ref 4–12)
NEUTROPHILS # BLD AUTO: 5.2 THOUSANDS/ΜL (ref 1.85–7.62)
NEUTS SEG NFR BLD AUTO: 64 % (ref 43–75)
NRBC BLD AUTO-RTO: 0 /100 WBCS
PLATELET # BLD AUTO: 274 THOUSANDS/UL (ref 149–390)
PMV BLD AUTO: 11.8 FL (ref 8.9–12.7)
POTASSIUM SERPL-SCNC: 3.9 MMOL/L (ref 3.5–5.3)
PROT SERPL-MCNC: 6.9 G/DL (ref 6.4–8.2)
RBC # BLD AUTO: 4.73 MILLION/UL (ref 3.81–5.12)
SODIUM SERPL-SCNC: 143 MMOL/L (ref 136–145)
TSH SERPL DL<=0.05 MIU/L-ACNC: 1.1 UIU/ML (ref 0.36–3.74)
WBC # BLD AUTO: 8.11 THOUSAND/UL (ref 4.31–10.16)

## 2019-06-08 PROCEDURE — 80053 COMPREHEN METABOLIC PANEL: CPT

## 2019-06-08 PROCEDURE — 83690 ASSAY OF LIPASE: CPT

## 2019-06-08 PROCEDURE — 84443 ASSAY THYROID STIM HORMONE: CPT

## 2019-06-08 PROCEDURE — 36415 COLL VENOUS BLD VENIPUNCTURE: CPT

## 2019-06-08 PROCEDURE — 82306 VITAMIN D 25 HYDROXY: CPT

## 2019-06-08 PROCEDURE — 82248 BILIRUBIN DIRECT: CPT

## 2019-06-08 PROCEDURE — 85025 COMPLETE CBC W/AUTO DIFF WBC: CPT

## 2019-06-11 ENCOUNTER — TELEPHONE (OUTPATIENT)
Dept: FAMILY MEDICINE CLINIC | Facility: CLINIC | Age: 39
End: 2019-06-11

## 2019-06-12 ENCOUNTER — TELEPHONE (OUTPATIENT)
Dept: FAMILY MEDICINE CLINIC | Facility: CLINIC | Age: 39
End: 2019-06-12

## 2019-09-13 ENCOUNTER — OFFICE VISIT (OUTPATIENT)
Dept: FAMILY MEDICINE CLINIC | Facility: CLINIC | Age: 39
End: 2019-09-13
Payer: COMMERCIAL

## 2019-09-13 VITALS
DIASTOLIC BLOOD PRESSURE: 70 MMHG | TEMPERATURE: 98.8 F | HEART RATE: 81 BPM | SYSTOLIC BLOOD PRESSURE: 118 MMHG | BODY MASS INDEX: 25.88 KG/M2 | OXYGEN SATURATION: 97 % | RESPIRATION RATE: 16 BRPM | HEIGHT: 66 IN | WEIGHT: 161 LBS
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
## 2019-09-13 DIAGNOSIS — B36.0 TINEA VERSICOLOR: ICD-10-CM

## 2019-09-13 DIAGNOSIS — R50.9 FEVER, UNSPECIFIED FEVER CAUSE: Primary | ICD-10-CM

## 2019-09-13 DIAGNOSIS — G44.86 CERVICOGENIC HEADACHE: ICD-10-CM

## 2019-09-13 PROCEDURE — 99214 OFFICE O/P EST MOD 30 MIN: CPT | Performed by: FAMILY MEDICINE

## 2019-09-13 PROCEDURE — 3008F BODY MASS INDEX DOCD: CPT | Performed by: FAMILY MEDICINE

## 2019-09-13 RX ORDER — KETOCONAZOLE 20 MG/G
CREAM TOPICAL 2 TIMES DAILY
Qty: 60 G | Refills: 1 | Status: SHIPPED | OUTPATIENT
Start: 2019-09-13 | End: 2019-12-10 | Stop reason: ALTCHOICE

## 2019-09-13 RX ORDER — DOXYCYCLINE HYCLATE 100 MG
100 TABLET ORAL 2 TIMES DAILY
Qty: 42 TABLET | Refills: 0 | Status: SHIPPED | OUTPATIENT
Start: 2019-09-13 | End: 2019-10-04

## 2019-09-13 RX ORDER — MULTIVIT-MIN/IRON/FOLIC ACID/K 18-600-40
CAPSULE ORAL DAILY
COMMUNITY
End: 2022-01-31

## 2019-09-13 NOTE — PROGRESS NOTES
FAMILY PRACTICE OFFICE VISIT       NAME: Siria Fink  AGE: 42 y o  SEX: female       : 1980        MRN: [id number]        Assessment and Plan     Problem List Items Addressed This Visit     None      Visit Diagnoses     Fever, unspecified fever cause    -  Primary    Relevant Medications    doxycycline hyclate (VIBRA-TABS) 100 mg tablet    Tick bite, sequela        Relevant Medications    ketoconazole (NIZORAL) 2 % cream    doxycycline hyclate (VIBRA-TABS) 100 mg tablet    Tinea versicolor        Relevant Medications    ketoconazole (NIZORAL) 2 % cream    Cervicogenic headache        Relevant Medications    doxycycline hyclate (VIBRA-TABS) 100 mg tablet        Patient presents for evaluation of new onset of fever, fatigue, no other localized symptoms  No findings of lymphadenopathy or pharyngitis on exam   She denies symptoms of cold  History of tick bite 2 weeks ago, no bull's eye rash  Clinically, I am concerned about possible onset of early Lyme  Possibility of viral illness/mono cannot be excluded as well  Given patient's history and highly endemic area, will start her on empiric doxycycline 100 mg b i d  For 3 weeks  I strongly advised patient to contact me in 2-3 days if her symptoms are not improving ;at that time will proceed with further diagnostic workup for her symptoms  I advised rest, fluids, Tylenol and ibuprofen as needed  Tenia versicolor:  Start Nizoral cream twice a day for the next few weeks  I advised patient to contact me in 2-3 weeks if rash is not improving  I explained patient that it might take a while for this rash to disappear  Contact isolation precautions emphasized  There are no Patient Instructions on file for this visit  Discussed with the patient and all questioned fully answered  She will call me if any problems arise  M*Modal software was used to dictate this note  It may contain errors with dictating incorrect words/spelling   Please contact provider directly with any questions  Chief Complaint     Chief Complaint   Patient presents with   Chales Knows Like Symptoms     Pt is here for cough, fevers, body aches 2 + days    Tick Removal     tick bite     Rash     Rash on back       History of Present Illness     Patient presents for evaluation of body aches, fatigue, fever, and general not well-being within past 3 days  Patient developed symptoms of fevers and chills on Wednesday, September 11th she describes her symptoms as flu-like  Patient developed rather sudden onset of fatigue and fever on Wednesday afternoon  Temperature up to 101, patient uses Tylenol and Advil  She had recurrences of fever yesterday and earlier today  Patient reports tension headaches, especially cervicogenic headaches which are new for her, within past 3 days  She denies symptoms of visual changes, nausea, vomiting or dizziness  Patient has been experiencing symptoms of seasonal allergies described as nasal congestion  She denies symptoms of sinus tenderness, pressure, sore throat, cough, chest tightness or wheezing  No colored mucus expectoration  Patient does not feel that she is experiencing symptoms of her typical sinus infection or cold  Patient reports that her mother has been experiencing similar symptoms  No other sick contacts or exposures  Patient recalls removing small take in her right antecubital area approximately 2 weeks ago  No rash right arm  Patient is also complaining of chronic appearing mildly pruritic rash on her entire back  She was seen by dermatologist for the same symptoms and was not prescribed any medications  Upper and lower back rash has been present for a few months and has not changed         Rash   Associated symptoms include congestion, fatigue and a fever  Pertinent negatives include no diarrhea or vomiting         Review of Systems   Review of Systems   Constitutional: Positive for activity change, fatigue and fever    HENT: Positive for congestion  Negative for ear pain, sinus pressure and trouble swallowing  Eyes: Negative  Respiratory: Negative  Cardiovascular: Negative  Gastrointestinal: Negative  Negative for abdominal pain, constipation, diarrhea, nausea and vomiting  Endocrine: Negative  Genitourinary: Negative  Negative for dysuria  Musculoskeletal: Positive for arthralgias and neck pain  Skin: Positive for rash  Allergic/Immunologic: Negative  Neurological: Positive for headaches  Negative for dizziness, light-headedness and numbness  Hematological: Negative  Psychiatric/Behavioral: Negative          Active Problem List     Patient Active Problem List   Diagnosis    Endometriosis determined by laparoscopy    Allergic rhinitis    Anxiety and depression    Elevated alkaline phosphatase level    Vitamin D deficiency    Gastroduodenitis       Past Medical History:  Past Medical History:   Diagnosis Date    Chronic kidney disease     [id number]    Diabetes mellitus ([facility] [address] )     gd last preg    Disease of thyroid gland     nodule    Endometriosis     Female infertility     iui with prior preg    Generalized anxiety disorder     last assessed 14    Renal calculi     last assessed 10/20/14; US 10/2014 3mm and 4mm right, non obstructing    Varicella     childhood       Past Surgical History:  Past Surgical History:   Procedure Laterality Date    APPENDECTOMY       SECTION      DIAGNOSTIC LAPAROSCOPY      multiple, last 10/2012, 10/2014    LAPAROSCOPIC OVARIAN CYSTECTOMY      LASER LAPAROSCOPY      NASAL SEPTUM SURGERY      [place]  DELIVERY ONLY N/A 2017    Procedure:  SECTION () REPEAT;  Surgeon: Christina Barroso DO;  [facility];  Service: Obstetrics    [place] Judi Going TUBE W/ Bilateral 2017    Procedure: LIGATION/COAGULATION TUBAL;  Surgeon: Christina Barroso DO;  [facility];  Service: Obstetrics Family History:  Family History   Problem Relation Age of Onset    Depression Mother     Hypertension Mother    [de-identified] / Stillbirths Mother     Anxiety disorder Mother         NOS    Heart defect Mother         aortic valve disorder    Endometriosis Mother     Cancer Sister         breast    Alcohol abuse Maternal Uncle     Cancer Maternal Grandmother         colon    Depression Maternal Grandmother     Dementia Maternal Grandmother     Vision loss Maternal Grandmother     Arthritis Paternal Grandmother     Hearing loss Paternal Grandmother     Heart disease Paternal Grandmother     Stroke Paternal Grandmother    [de-identified] / Stillbirths Paternal [de-identified]     Colon cancer Paternal [de-identified]     Hyperlipidemia Father         high cholesterol    Endometriosis Sister        Social History:  Social History     Socioeconomic History    Marital status: /Civil Union     Spouse name: Not on file    Number of children: Not on file    Years of education: Not on file    Highest education level: Not on file   Occupational History    Not on file   Social Needs    Financial resource strain: Not on file    Food insecurity:     Worry: Not on file     Inability: Not on file    Transportation needs:     Medical: Not on file     Non-medical: Not on file   Tobacco Use    Smoking status: Former Smoker    Smokeless tobacco: Never Used    Tobacco comment: Current smoker-quit 2 weeks agao, when found pregnant, smoked half pay daily, as per [employer]   Substance and Sexual Activity    Alcohol use: No    Drug use: No    Sexual activity: Yes     Partners: Male     Birth control/protection: Female Sterilization   Lifestyle    Physical activity:     Days per week: Not on file     Minutes per session: Not on file    Stress: Not on file   Relationships    Social connections:     Talks on phone: Not on file     Gets together: Not on file     Attends Nondenominational service: Not on file Active member of club or organization: Not on file     Attends meetings of clubs or organizations: Not on file     Relationship status: Not on file    Intimate partner violence:     Fear of current or ex partner: Not on file     Emotionally abused: Not on file     Physically abused: Not on file     Forced sexual activity: Not on file   Other Topics Concern    Not on file   Social History Narrative    Not on file     Objective     Vitals:    09/13/19 0758   BP: 118/70   Pulse: 81   Resp: 16   Temp: 98 8 °F (37 1 °C)   TempSrc: Tympanic   SpO2: 97%   Weight: 73 kg (161 lb)   Height: 5' 5 5" (1 664 m)     Wt Readings from Last 3 Encounters:   09/13/19 73 kg (161 lb)   06/04/19 74 kg (163 lb 3 2 oz)   05/13/19 74 4 kg (164 lb)       Physical Exam   Constitutional: She is oriented to person, place, and time  She appears well-developed and well-nourished  Fatigued   HENT:   Head: Normocephalic and atraumatic  Right Ear: Tympanic membrane and external ear normal    Left Ear: Tympanic membrane and external ear normal    Mouth/Throat: Oropharynx is clear and moist  No oropharyngeal exudate  Eyes: Conjunctivae are normal    Neck: Neck supple  Carotid bruit is not present  Cardiovascular: Normal rate, regular rhythm and normal heart sounds  No murmur heard  Pulmonary/Chest: Effort normal and breath sounds normal  No respiratory distress  She has no wheezes  Abdominal: Soft  Normal appearance and bowel sounds are normal  She exhibits no distension and no abdominal bruit  There is no hepatosplenomegaly  There is tenderness in the left lower quadrant  There is no rigidity, no rebound, no guarding and no CVA tenderness  Musculoskeletal: Normal range of motion  She exhibits no edema  Lymphadenopathy:     She has no cervical adenopathy  Neurological: She is alert and oriented to person, place, and time  No cranial nerve deficit   Coordination normal    Skin: Rash (Tenia versicolor rash upper and lower back) noted    Right antecubital area:  No rash at the site of tick bite    Psychiatric: She has a normal mood and affect  Her behavior is normal    Nursing note and vitals reviewed        Pertinent Laboratory/Diagnostic Studies:  Lab Results   Component Value Date    GLUCOSE 104 08/15/2015    BUN 9 06/08/2019    CREATININE 0 64 06/08/2019    CALCIUM 8 8 06/08/2019     08/15/2015    K 3 9 06/08/2019    CO2 30 06/08/2019     06/08/2019     Lab Results   Component Value Date    ALT 15 06/08/2019    AST 8 06/08/2019    ALKPHOS 105 06/08/2019    BILITOT 0 30 08/15/2015       Lab Results   Component Value Date    WBC 8 11 06/08/2019    HGB 13 3 06/08/2019    HCT 40 1 06/08/2019    MCV 85 06/08/2019     06/08/2019       No results found for: TSH    No results found for: CHOL  No results found for: TRIG  No results found for: HDL  No results found for: [facility]  Lab Results   Component Value Date    HGBA1C 5 2 08/15/2015       Results for orders placed or performed in visit on 06/08/19   Vitamin D 25 hydroxy   Result Value Ref Range    Vit D, 25-Hydroxy 14 7 (L) 30 0 - 100 0 ng/mL   CBC and differential   Result Value Ref Range    WBC 8 11 4 31 - 10 16 Thousand/uL    RBC 4 73 3 81 - 5 12 Million/uL    Hemoglobin 13 3 11 5 - 15 4 g/dL    Hematocrit 40 1 34 8 - 46 1 %    MCV 85 82 - 98 fL    MCH 28 1 26 8 - 34 3 pg    MCHC 33 2 31 4 - 37 4 g/dL    RDW 13 7 11 6 - 15 1 %    MPV 11 8 8 9 - 12 7 fL    Platelets 443 338 - 665 Thousands/uL    nRBC 0 /100 WBCs    Neutrophils Relative 64 43 - 75 %    Immat GRANS % 0 0 - 2 %    Lymphocytes Relative 30 14 - 44 %    Monocytes Relative 5 4 - 12 %    Eosinophils Relative 1 0 - 6 %    Basophils Relative 0 0 - 1 %    Neutrophils Absolute 5 20 1 85 - 7 62 Thousands/µL    Immature Grans Absolute 0 01 0 00 - 0 20 Thousand/uL    Lymphocytes Absolute 2 40 0 60 - 4 47 Thousands/µL    Monocytes Absolute 0 38 0 17 - 1 22 Thousand/µL    Eosinophils Absolute 0 09 0 00 - 0 61 Thousand/µL Basophils Absolute 0 03 0 00 - 0 10 Thousands/µL   Comprehensive metabolic panel   Result Value Ref Range    Sodium 143 136 - 145 mmol/L    Potassium 3 9 3 5 - 5 3 mmol/L    Chloride 108 100 - 108 mmol/L    CO2 30 21 - 32 mmol/L    ANION GAP 5 4 - 13 mmol/L    BUN 9 5 - 25 mg/dL    Creatinine 0 64 0 60 - 1 30 mg/dL    Glucose, Fasting 100 (H) 65 - 99 mg/dL    Calcium 8 8 8 3 - 10 1 mg/dL    AST 8 5 - 45 U/L    ALT 15 12 - 78 U/L    Alkaline Phosphatase 105 46 - 116 U/L    Total Protein 6 9 6 4 - 8 2 g/dL    Albumin 4 1 3 5 - 5 0 g/dL    Total Bilirubin 0 69 0 20 - 1 00 mg/dL    eGFR 115 ml/min/1 73sq m   TSH, 3rd generation   Result Value Ref Range    TSH 3RD GENERATON 1 100 0 358 - 3 740 uIU/mL   Lipase   Result Value Ref Range    Lipase 102 73 - 393 u/L   Bilirubin, direct   Result Value Ref Range    Bilirubin, Direct 0 14 0 00 - 0 20 mg/dL       No orders of the defined types were placed in this encounter        ALLERGIES:  Allergies   Allergen Reactions    Cefprozil Shortness Of Breath    Amoxicillin      Thrush,yeast infection    Pertussis Vaccine     Shellfish-Derived Products Hives       Current Medications     Current Outpatient Medications   Medication Sig Dispense Refill    cetirizine (ZyrTEC) 10 mg tablet Take 10 mg by mouth daily at bedtime       Cholecalciferol (VITAMIN D) 2000 units CAPS Take by mouth daily      escitalopram (LEXAPRO) 10 mg tablet Take half a tablet once a day for 6 days then take 1 tablet once a day 30 tablet 3    fluticasone (FLONASE) 50 mcg/act nasal spray 2 sprays into each nostril daily 1 Bottle 0    levonorgestrel (MIRENA) 20 MCG/24HR IUD 1 each by Intrauterine route once      loratadine (CLARITIN) 10 mg tablet Take 10 mg by mouth daily      doxycycline hyclate (VIBRA-TABS) 100 mg tablet Take 1 tablet (100 mg total) by mouth 2 (two) times a day for 21 days 42 tablet 0    ketoconazole (NIZORAL) 2 % cream Apply topically 2 (two) times a day 60 g 1     No current facility-administered medications for this visit          Medications Discontinued During This Encounter   Medication Reason    pantoprazole (PROTONIX) 40 mg tablet        Health Maintenance     Health Maintenance   Topic Date Due    BMI: Followup Plan  07/24/2000    DTaP,Tdap,and Td Vaccines (6 - Tdap) 08/29/2018    INFLUENZA VACCINE  07/01/2019    BMI: Adult  09/13/2020    PAP SMEAR  02/21/2021    Pneumococcal Vaccine: 65+ Years (1 of 2 - PCV13) 07/24/2047    Pneumococcal Vaccine: Pediatrics (0 to 5 Years) and At-Risk Patients (6 to 59 Years)  Aged Out    HEPATITIS B VACCINES  Aged [employer] History   Administered Date(s) Administered    DT (pediatric) 07/16/1984, 10/11/1993    DTaP 1980, 1982, 1982    DTaP 5 1982, 1982, 1982, 03/28/1983, 08/29/2008    INFLUENZA 10/10/2015, 10/08/2016, 10/17/2017    IPV 01/03/1983, 01/28/1983, 04/25/1983, 07/16/1984, 10/01/1987    Influenza Quadrivalent Preservative Free 3 years and older IM 10/10/2015, 10/08/2016    Influenza, Quadrivalent (nasal) 10/10/2015, 10/08/2016    Influenza, injectable, quadrivalent, preservative free 0 5 mL 10/29/2018    MMR 11/23/1983, 10/11/1993    Meningococcal MCV4P 08/02/2000, 08/29/2008    Meningococcal, Unknown Serogroups 08/02/2000, 08/29/2008    Mumps 08/22/1983    Rubella 11/23/1983, 09/21/1984    Tuberculin Skin Test-PPD Intradermal 02/06/2012       Indy Ferro MD
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
## 2019-09-27 DIAGNOSIS — G44.86 CERVICOGENIC HEADACHE: ICD-10-CM

## 2019-09-27 DIAGNOSIS — R50.9 FEVER, UNSPECIFIED FEVER CAUSE: ICD-10-CM

## 2019-09-27 RX ORDER — DOXYCYCLINE HYCLATE 100 MG
100 TABLET ORAL 2 TIMES DAILY
Qty: 42 TABLET | Refills: 0 | OUTPATIENT
Start: 2019-09-27 | End: 2019-10-18

## 2019-10-08 ENCOUNTER — IMMUNIZATIONS (OUTPATIENT)
Dept: FAMILY MEDICINE CLINIC | Facility: CLINIC | Age: 39
End: 2019-10-08
Payer: COMMERCIAL

## 2019-10-08 DIAGNOSIS — Z23 FLU VACCINE NEED: Primary | ICD-10-CM

## 2019-10-08 PROCEDURE — 90471 IMMUNIZATION ADMIN: CPT

## 2019-10-08 PROCEDURE — 90686 IIV4 VACC NO PRSV 0.5 ML IM: CPT

## 2019-10-24 ENCOUNTER — TELEPHONE (OUTPATIENT)
Dept: OBGYN CLINIC | Facility: CLINIC | Age: 39
End: 2019-10-24

## 2019-10-24 DIAGNOSIS — R10.31 RIGHT LOWER QUADRANT PAIN: Primary | ICD-10-CM

## 2019-10-24 NOTE — TELEPHONE ENCOUNTER
The patient called today for right lower quadrant pain for greater than 24 hours  I suggest she go to the emergency department for further evaluation  She was to pain is an 8/10  She denies fever chills  She denies this plan would like to be evaluated with an ultrasound prior to the ER visit  We have ordered a abdominal and transvaginal ultrasound for possible ovarian cyst or ovarian torsion  Her appendix are removed  She has had multiple laparoscopies in the past   She will be informed results of the ultrasound colposcopy seen in the office tomorrow  The patient was counseled of the pain gets worse to go to the emergency department soon as possible

## 2019-10-24 NOTE — TELEPHONE ENCOUNTER
Patient stating having lower abdominal pain that radiates to her back  Using heating pad and Chay Ramos spoke to patient and recommended a pelvic ultrasound

## 2019-10-27 ENCOUNTER — HOSPITAL ENCOUNTER (OUTPATIENT)
Dept: RADIOLOGY | Facility: HOSPITAL | Age: 39
Discharge: HOME | End: 2019-10-27
Attending: OBSTETRICS & GYNECOLOGY
Payer: COMMERCIAL

## 2019-10-27 DIAGNOSIS — R10.31 RIGHT LOWER QUADRANT PAIN: ICD-10-CM

## 2019-10-27 PROCEDURE — 76830 TRANSVAGINAL US NON-OB: CPT

## 2019-10-27 PROCEDURE — 76856 US EXAM PELVIC COMPLETE: CPT

## 2019-11-01 DIAGNOSIS — F41.9 ANXIETY AND DEPRESSION: ICD-10-CM

## 2019-11-01 DIAGNOSIS — F32.A ANXIETY AND DEPRESSION: ICD-10-CM

## 2019-11-01 RX ORDER — ESCITALOPRAM OXALATE 10 MG/1
10 TABLET ORAL DAILY
Qty: 30 TABLET | Refills: 5 | Status: SHIPPED | OUTPATIENT
Start: 2019-11-01 | End: 2019-12-10

## 2019-12-10 ENCOUNTER — OFFICE VISIT (OUTPATIENT)
Dept: FAMILY MEDICINE CLINIC | Facility: CLINIC | Age: 39
End: 2019-12-10
Payer: COMMERCIAL

## 2019-12-10 VITALS
BODY MASS INDEX: 25.97 KG/M2 | TEMPERATURE: 98.5 F | RESPIRATION RATE: 16 BRPM | HEART RATE: 105 BPM | SYSTOLIC BLOOD PRESSURE: 120 MMHG | WEIGHT: 161.6 LBS | HEIGHT: 66 IN | DIASTOLIC BLOOD PRESSURE: 90 MMHG | OXYGEN SATURATION: 97 %

## 2019-12-10 DIAGNOSIS — J06.9 UPPER RESPIRATORY TRACT INFECTION, UNSPECIFIED TYPE: Primary | ICD-10-CM

## 2019-12-10 PROCEDURE — 3008F BODY MASS INDEX DOCD: CPT | Performed by: NURSE PRACTITIONER

## 2019-12-10 PROCEDURE — 1036F TOBACCO NON-USER: CPT | Performed by: NURSE PRACTITIONER

## 2019-12-10 PROCEDURE — 99213 OFFICE O/P EST LOW 20 MIN: CPT | Performed by: NURSE PRACTITIONER

## 2019-12-10 RX ORDER — DEXTROMETHORPHAN HYDROBROMIDE AND PROMETHAZINE HYDROCHLORIDE 15; 6.25 MG/5ML; MG/5ML
5 SOLUTION ORAL 4 TIMES DAILY PRN
Qty: 240 ML | Refills: 0 | Status: SHIPPED | OUTPATIENT
Start: 2019-12-10 | End: 2020-01-16 | Stop reason: ALTCHOICE

## 2019-12-10 RX ORDER — METHYLPREDNISOLONE 4 MG/1
TABLET ORAL
Qty: 21 EACH | Refills: 0 | Status: SHIPPED | OUTPATIENT
Start: 2019-12-10 | End: 2020-01-16 | Stop reason: ALTCHOICE

## 2019-12-10 RX ORDER — AZITHROMYCIN 250 MG/1
TABLET, FILM COATED ORAL
Qty: 6 TABLET | Refills: 0 | Status: SHIPPED | OUTPATIENT
Start: 2019-12-10 | End: 2019-12-15

## 2019-12-10 NOTE — PROGRESS NOTES
FAMILY PRACTICE OFFICE VISIT       NAME: Nik Fink  AGE: 42 y o  SEX: female       : 1980        MRN: [id number]    DATE: 12/10/2019    Assessment and Plan     Problem List Items Addressed This Visit     None      Visit Diagnoses     Upper respiratory tract infection, unspecified type    -  Primary    Relevant Medications    azithromycin (ZITHROMAX) 250 mg tablet    methylPREDNISolone 4 MG tablet therapy pack    Promethazine-DM (PHENERGAN-DM) 6 25-15 mg/5 mL oral syrup        1  Upper respiratory tract infection, unspecified type  azithromycin (ZITHROMAX) 250 mg tablet    methylPREDNISolone 4 MG tablet therapy pack    Promethazine-DM (PHENERGAN-DM) 6 25-15 mg/5 mL oral syrup     This 45-year-old female presents today for upper respiratory symptoms that began 3 days ago, including cough, fever, sore throat, chest tightness, headaches  Will treat with a Z-Sohan and Medrol Dosepak  Promethazine DM as needed for cough  Continue supportive care:  Rest, fluids, warm tea, honey, humidification  Tylenol as needed for fever  If symptoms worsen, if symptoms are not improving over the next few days, she will call  BMI Counseling: Body mass index is 26 48 kg/m²  The BMI is above normal  Nutrition recommendations include 3-5 servings of fruits/vegetables daily, consuming healthier snacks, moderation in carbohydrate intake and increasing intake of lean protein  Exercise recommendations include moderate aerobic physical activity for 150 minutes/week  Admits she is having trouble losing last 10 pounds after birth of her 3year old son  Tries to walk at work  Hard to exercise with balancing work, caring for home, and children  Chief Complaint     Chief Complaint   Patient presents with    Cold Like Symptoms     Pt is here for cough, headache, chest tightness 3 + days       History of Present Illness     Tesha Quintanilla is a 45-year-old female presenting today for cold symptoms that began 3 days ago    Symptoms include frequent cough, productive cough, fatigue, nasal congestion, sore throat, rhinorrhea, headache  Chest feels tight, uncomfortable taking deep breaths  Fevers ranging 100-101  No known sick contacts  Recently returned from travel in [place], [place] for work  Traveled via air  Has been taking Zyrtec at bedtime, and Sudafed in the morning  Has also tried Mucinex  Review of Systems   Review of Systems   Constitutional: Positive for fatigue  Negative for chills, diaphoresis and fever  HENT: Positive for congestion, postnasal drip, rhinorrhea, sinus pressure and sore throat  Negative for ear pain  Respiratory: Positive for cough and chest tightness  Negative for shortness of breath and wheezing  Cardiovascular: Negative for chest pain, palpitations and leg swelling  Musculoskeletal: Negative for myalgias  Neurological: Positive for headaches  Negative for dizziness         Active Problem List     Patient Active Problem List   Diagnosis    Endometriosis determined by laparoscopy    Allergic rhinitis    Anxiety and depression    Elevated alkaline phosphatase level    Vitamin D deficiency    Gastroduodenitis       Past Medical History:  Past Medical History:   Diagnosis Date    Chronic kidney disease     [id number]    Diabetes mellitus ([facility] [address] )     gd last preg    Disease of thyroid gland     nodule    Endometriosis     Female infertility     iui with prior preg    Generalized anxiety disorder     last assessed 14    Renal calculi     last assessed 10/20/14; US 10/2014 3mm and 4mm right, non obstructing    Varicella     childhood       Past Surgical History:  Past Surgical History:   Procedure Laterality Date    APPENDECTOMY       SECTION      DIAGNOSTIC LAPAROSCOPY      multiple, last 10/2012, 10/2014    LAPAROSCOPIC OVARIAN CYSTECTOMY      LASER LAPAROSCOPY      NASAL SEPTUM SURGERY      CA  DELIVERY ONLY N/A 2017    Procedure:  SECTION () REPEAT;  Surgeon: Vitaly Oglesby DO;  Location: [facility] ;  Service: Obstetrics    PA LIGATION,FALLOPIAN TUBE W/ Bilateral 2017    Procedure: LIGATION/COAGULATION TUBAL;  Surgeon: Vitaly Oglesby DO;  [facility];  Service: Obstetrics       Family History:  Family History   Problem Relation Age of Onset    Depression Mother     Hypertension Mother     Miscarriages / [place] Mother     Anxiety disorder Mother         NOS    Heart defect Mother         aortic valve disorder    Endometriosis Mother     Cancer Sister         breast    Alcohol abuse Maternal Uncle     Cancer Maternal Grandmother         colon    Depression Maternal Grandmother     Dementia Maternal Grandmother     Vision loss Maternal Grandmother     Arthritis Paternal Grandmother     Hearing loss Paternal Grandmother     Heart disease Paternal Grandmother     Stroke Paternal Grandmother    [de-identified] / Stillbirths Paternal [de-identified]     Colon cancer Paternal [de-identified]     Hyperlipidemia Father         high cholesterol    Endometriosis Sister        Social History:  Social History     Socioeconomic History    Marital status: /Civil Union     Spouse name: Not on file    Number of children: Not on file    Years of education: Not on file    Highest education level: Not on file   Occupational History    Not on file   Social Needs    Financial resource strain: Not on file    Food insecurity:     Worry: Not on file     Inability: Not on file    Transportation needs:     Medical: Not on file     Non-medical: Not on file   Tobacco Use    Smoking status: Former Smoker    Smokeless tobacco: Never Used    Tobacco comment: Current smoker-quit 2 weeks agao, when found pregnant, smoked half pay daily, as per [employer]   Substance and Sexual Activity    Alcohol use: No    Drug use: No    Sexual activity: Yes     Partners: Male     Birth control/protection: Female Sterilization Lifestyle    Physical activity:     Days per week: Not on file     Minutes per session: Not on file    Stress: Not on file   Relationships    Social connections:     Talks on phone: Not on file     Gets together: Not on file     Attends [employer] service: Not on file     Active member of club or organization: Not on file     Attends meetings of clubs or organizations: Not on file     Relationship status: Not on file    Intimate partner violence:     Fear of current or ex partner: Not on file     Emotionally abused: Not on file     Physically abused: Not on file     Forced sexual activity: Not on file   Other Topics Concern    Not on file   Social History Narrative    Not on file       I have reviewed the patient's medical history in detail; there are no changes to the history as noted in the electronic medical record  Objective     Vitals:    12/10/19 1243   BP: 120/90   Pulse: 105   Resp: 16   Temp: 98 5 °F (36 9 °C)   TempSrc: Tympanic   SpO2: 97%   Weight: 73 3 kg (161 lb 9 6 oz)   Height: 5' 5 5" (1 664 m)     Wt Readings from Last 3 Encounters:   12/10/19 73 3 kg (161 lb 9 6 oz)   09/13/19 73 kg (161 lb)   06/04/19 74 kg (163 lb 3 2 oz)     Physical Exam   Constitutional: She appears well-developed and well-nourished  No distress  HENT:   Head: Normocephalic and atraumatic  Right Ear: Tympanic membrane and ear canal normal    Left Ear: Tympanic membrane and ear canal normal    Nose: Mucosal edema and rhinorrhea present  Mouth/Throat: Uvula is midline  Posterior oropharyngeal erythema present  No oropharyngeal exudate or posterior oropharyngeal edema  Eyes: Conjunctivae are normal    Neck: Normal range of motion  Neck supple  Cardiovascular: Normal rate, regular rhythm and normal heart sounds  Pulmonary/Chest: Effort normal and breath sounds normal    Lymphadenopathy:     She has no cervical adenopathy  Skin: No rash noted  Psychiatric: She has a normal mood and affect     Nursing note and vitals reviewed  ALLERGIES:  Allergies   Allergen Reactions    Cefprozil Shortness Of Breath    Pertussis Vaccine     Shellfish-Derived Products Hives    Amoxicillin      Thrush,yeast infection       Current Medications     Current Outpatient Medications   Medication Sig Dispense Refill    cetirizine (ZyrTEC) 10 mg tablet Take 10 mg by mouth daily at bedtime       Cholecalciferol (VITAMIN D) 2000 units CAPS Take by mouth daily      escitalopram (LEXAPRO) 10 mg tablet Take half a tablet once a day for 6 days then take 1 tablet once a day (Patient taking differently: Take 10 mg by mouth daily Taking 1 daily) 30 tablet 3    fluticasone (FLONASE) 50 mcg/act nasal spray 2 sprays into each nostril daily 1 Bottle 0    levonorgestrel (MIRENA) 20 MCG/24HR IUD 1 each by Intrauterine route once      loratadine (CLARITIN) 10 mg tablet Take 10 mg by mouth daily      azithromycin (ZITHROMAX) 250 mg tablet Take 2 tablets on day 1 and then take 1 tablet on days 2-5  6 tablet 0    methylPREDNISolone 4 MG tablet therapy pack Use as directed on package 21 each 0    Promethazine-DM (PHENERGAN-DM) 6 25-15 mg/5 mL oral syrup Take 5 mL by mouth 4 (four) times a day as needed for cough 240 mL 0     No current facility-administered medications for this visit            Health Maintenance     Health Maintenance   Topic Date Due    BMI: Followup Plan  07/24/2000    DTaP,Tdap,and Td Vaccines (6 - Tdap) 12/10/2020 (Originally 8/29/2018)    BMI: Adult  12/10/2020    Cervical Cancer Screening  02/21/2021    Pneumococcal Vaccine: 65+ Years (1 of 2 - PCV13) 07/24/2047    HIV Screening  Completed    Influenza Vaccine  Completed    IPV Vaccine  Completed    Meningococcal ACWY Vaccine  Completed    Pneumococcal Vaccine: Pediatrics (0 to 5 Years) and At-Risk Patients (6 to 59 Years)  Aged Out    HIB Vaccine  Aged Out    Hepatitis B Vaccine  Aged Out    Hepatitis A Vaccine  Aged Out    HPV Vaccine  Aged [place] Immunization History   Administered Date(s) Administered    DT (pediatric) 07/16/1984, 10/11/1993    DTaP 1980, 1982, 1982    DTaP 5 1982, 1982, 1982, 03/28/1983, 08/29/2008    INFLUENZA 10/10/2015, 10/08/2016, 10/17/2017    IPV 01/03/1983, 01/28/1983, 04/25/1983, 07/16/1984, 10/01/1987    Influenza Quadrivalent Preservative Free 3 years and older IM 10/10/2015, 10/08/2016    Influenza, Quadrivalent (nasal) 10/10/2015, 10/08/2016    Influenza, injectable, quadrivalent, preservative free 0 5 mL 10/29/2018, 10/08/2019    MMR 11/23/1983, 10/11/1993    Meningococcal MCV4P 08/02/2000, 08/29/2008    Meningococcal, Unknown Serogroups 08/02/2000, 08/29/2008    Mumps 08/22/1983    Rubella 11/23/1983, 09/21/1984    Tuberculin Skin Test-PPD Intradermal 02/06/2012       AQUILES Garcia

## 2020-01-16 ENCOUNTER — OFFICE VISIT (OUTPATIENT)
Dept: FAMILY MEDICINE CLINIC | Facility: CLINIC | Age: 40
End: 2020-01-16
Payer: COMMERCIAL

## 2020-01-16 ENCOUNTER — HOSPITAL ENCOUNTER (OUTPATIENT)
Dept: RADIOLOGY | Age: 40
Discharge: HOME | End: 2020-01-16
Payer: COMMERCIAL

## 2020-01-16 VITALS
DIASTOLIC BLOOD PRESSURE: 88 MMHG | HEART RATE: 90 BPM | SYSTOLIC BLOOD PRESSURE: 110 MMHG | TEMPERATURE: 97.5 F | BODY MASS INDEX: 25.55 KG/M2 | WEIGHT: 159 LBS | OXYGEN SATURATION: 99 % | HEIGHT: 66 IN | RESPIRATION RATE: 16 BRPM

## 2020-01-16 DIAGNOSIS — G44.52 NEW PERSISTENT DAILY HEADACHE: ICD-10-CM

## 2020-01-16 DIAGNOSIS — G44.52 NEW PERSISTENT DAILY HEADACHE: Primary | ICD-10-CM

## 2020-01-16 PROCEDURE — 70450 CT HEAD/BRAIN W/O DYE: CPT

## 2020-01-16 PROCEDURE — 3008F BODY MASS INDEX DOCD: CPT | Performed by: FAMILY MEDICINE

## 2020-01-16 PROCEDURE — 99214 OFFICE O/P EST MOD 30 MIN: CPT | Performed by: FAMILY MEDICINE

## 2020-01-16 PROCEDURE — 1036F TOBACCO NON-USER: CPT | Performed by: FAMILY MEDICINE

## 2020-01-16 RX ORDER — PREDNISONE 10 MG/1
TABLET ORAL
Qty: 20 TABLET | Refills: 0 | Status: SHIPPED | OUTPATIENT
Start: 2020-01-16 | End: 2020-04-29 | Stop reason: ALTCHOICE

## 2020-01-16 RX ORDER — KETOROLAC TROMETHAMINE 30 MG/ML
30 INJECTION, SOLUTION INTRAMUSCULAR; INTRAVENOUS ONCE
Status: COMPLETED | OUTPATIENT
Start: 2020-01-16 | End: 2020-01-16

## 2020-01-16 RX ORDER — CYCLOBENZAPRINE HCL 5 MG
TABLET ORAL
Qty: 30 TABLET | Refills: 0 | Status: SHIPPED | OUTPATIENT
Start: 2020-01-16 | End: 2020-04-29 | Stop reason: ALTCHOICE

## 2020-01-16 RX ADMIN — KETOROLAC TROMETHAMINE 30 MG: 30 INJECTION, SOLUTION INTRAMUSCULAR; INTRAVENOUS at 16:32

## 2020-01-16 NOTE — PROGRESS NOTES
FAMILY PRACTICE OFFICE VISIT       NAME: Woody Fink  AGE: 42 y o  SEX: female       : 1980        MRN: [id number]        Assessment and Plan     Problem List Items Addressed This Visit     None      Visit Diagnoses     New persistent daily headache    -  Primary    Relevant Medications    predniSONE 10 mg tablet    cyclobenzaprine (FLEXERIL) 5 mg tablet    ketorolac (TORADOL) injection 30 mg (Completed)    Other Relevant Orders    CT head wo contrast (Completed)      Patient presents for evaluation of new persistent daily headaches within past 3 weeks  No similar symptoms in the past   She denies preceding injury or fall  No syncope or visual changes  Patient has been experiencing persistent photophobia associated with intermittent dizziness, nausea and vomiting  She has tried multiple over-the-counter medication with only partial and incomplete improvement  Patient is nontoxic and afebrile  No recent URI symptoms, no typical sinus headache features  Her headache could be migraine/tension/cervicogenic in nature  Injection of Toradol 30 mg IM administered in the office today  Patient will start prednisone taper as 40 mg daily for 2 days then 30 mg daily for 2 days then 20 mg daily for 2 days then 10 mg daily for 2 days  Will try Flexeril 5-10 mg q h s  P r n  neck spasm  I strongly advised patient to contact me in a few days if her symptoms are not improving significantly or if they recur at any time  There are no Patient Instructions on file for this visit  Discussed with the patient and all questioned fully answered  She will call me if any problems arise  [employer] software was used to dictate this note  It may contain errors with dictating incorrect words/spelling  Please contact provider directly with any questions         Chief Complaint     Chief Complaint   Patient presents with    Headache     Pt is here for migraines 3 + wks       History of Present Illness     Patient presents for evaluation of persistent headaches x3 weeks  She reports remote history of migraines as an 8year-old child, migraines have resolved by the age of 17  She has been experiencing intermittent migraine headaches once a year  Current headache started 3 weeks ago  She describes it as generalized frontal and left hemicranial and left occipital/cervicogenic  Patient has tried multiple medications including Zyrtec D, Sudafed, Aleve, ibuprofen, Tylenol and Excedrin migraine  Lately she has been using Excedrin migraine which provides her with temporary, 2-3 hour relief, then symptoms recur  Patient admits to some symptoms of photophobia and phonophobia  She is scheduled for eye exam   Headache has been persistent since December 23rd  Patient believes that current headache symptoms do not resemble typical sinus congestion and sinus headaches that she has experienced on many occasions  Patient denies visual changes  She denies preceding fall, injury or loss of consciousness  Intermittent symptoms of dizziness and nausea  Patient vomited over Saman Geovanna and later this week on Monday on a few occasions  No recurrences of vomiting within last 3 days  Patient is afebrile  No cold symptoms  Menses light and irregular  Patient is on Mirena  Headache    This is a new problem  The current episode started 1 to 4 weeks ago  The problem occurs daily  The problem has been waxing and waning  The pain is located in the occipital, frontal and left unilateral region  The pain does not radiate  The pain quality is not similar to prior headaches  The quality of the pain is described as dull and aching  The pain is moderate  Associated symptoms include dizziness, nausea, neck pain, phonophobia, photophobia and vomiting   Pertinent negatives include no abdominal pain, blurred vision, ear pain, eye pain, eye watering, facial sweating, fever, loss of balance, numbness, rhinorrhea, sinus pressure, sore throat, swollen glands, tingling or tinnitus  Nothing aggravates the symptoms  She has tried acetaminophen, Excedrin and darkened room for the symptoms  The treatment provided no relief  Her past medical history is significant for migraine headaches and sinus disease  There is no history of hypertension or recent head traumas  Review of Systems   Review of Systems   Constitutional: Positive for fatigue  Negative for activity change, appetite change, chills and fever  HENT: Negative  Negative for ear pain, rhinorrhea, sinus pressure, sore throat and tinnitus  Eyes: Positive for photophobia  Negative for blurred vision, pain and visual disturbance  Respiratory: Negative  Cardiovascular: Negative  Gastrointestinal: Positive for nausea and vomiting  Negative for abdominal pain  Endocrine: Negative  Genitourinary: Negative  Musculoskeletal: Positive for neck pain  Skin: Negative  Neurological: Positive for dizziness and headaches  Negative for tingling, syncope, facial asymmetry, numbness and loss of balance  Hematological: Negative  Psychiatric/Behavioral: Negative          Active Problem List     Patient Active Problem List   Diagnosis    Endometriosis determined by laparoscopy    Allergic rhinitis    Anxiety and depression    Elevated alkaline phosphatase level    Vitamin D deficiency    Gastroduodenitis       Past Medical History:  Past Medical History:   Diagnosis Date    Chronic kidney disease     [id number]    Diabetes mellitus ([facility] [address] )     gd last preg    Disease of thyroid gland     nodule    Endometriosis     Female infertility     iui with prior preg    Generalized anxiety disorder     last assessed 14    Renal calculi     last assessed 10/20/14; US 10/2014 3mm and 4mm right, non obstructing    Varicella     childhood       Past Surgical History:  Past Surgical History:   Procedure Laterality Date    APPENDECTOMY       SECTION      DIAGNOSTIC LAPAROSCOPY      multiple, last 10/2012, 10/2014    LAPAROSCOPIC OVARIAN CYSTECTOMY      LASER LAPAROSCOPY      NASAL SEPTUM SURGERY      [facility]  DELIVERY ONLY N/A 2017    Procedure:  SECTION () REPEAT;  Surgeon: Ish Reyes DO;  [facility];  Service: Obstetrics    VA LIGATION,FALLOPIAN TUBE W/ Bilateral 2017    Procedure: LIGATION/COAGULATION TUBAL;  Surgeon: Ish Reyes DO;  Location: [facility] ;  Service: Obstetrics       Family History:  Family History   Problem Relation Age of Onset    Depression Mother     Hypertension Mother     Miscarriages / Stillbirths Mother     Anxiety disorder Mother         NOS    Heart defect Mother         aortic valve disorder    Endometriosis Mother     Cancer Sister         breast    Alcohol abuse Maternal Uncle     Cancer Maternal Grandmother         colon    Depression Maternal Grandmother     Dementia Maternal Grandmother     Vision loss Maternal Grandmother     Arthritis Paternal Grandmother     Hearing loss Paternal Grandmother     Heart disease Paternal Grandmother     Stroke Paternal Grandmother    [de-identified] / Stillbirths Paternal [de-identified]     Colon cancer Paternal [de-identified]     Hyperlipidemia Father         high cholesterol    Endometriosis Sister        Social History:  Social History     Socioeconomic History    Marital status: /Civil Union     Spouse name: Not on file    Number of children: Not on file    Years of education: Not on file    Highest education level: Not on file   Occupational History    Not on file   Social Needs    Financial resource strain: Not on file    Food insecurity:     Worry: Not on file     Inability: Not on file    Transportation needs:     Medical: Not on file     Non-medical: Not on file   Tobacco Use    Smoking status: Former Smoker    Smokeless tobacco: Never Used    Tobacco comment: Current smoker-quit 2 weeks agao, when found pregnant, smoked half pay daily, as per [employer]   Substance and Sexual Activity    Alcohol use: No    Drug use: No    Sexual activity: Yes     Partners: Male     Birth control/protection: Female Sterilization   Lifestyle    Physical activity:     Days per week: Not on file     Minutes per session: Not on file    Stress: Not on file   Relationships    Social connections:     Talks on phone: Not on file     Gets together: Not on file     Attends Sabianism service: Not on file     Active member of club or organization: Not on file     Attends meetings of clubs or organizations: Not on file     Relationship status: Not on file    Intimate partner violence:     Fear of current or ex partner: Not on file     Emotionally abused: Not on file     Physically abused: Not on file     Forced sexual activity: Not on file   Other Topics Concern    Not on file   Social History Narrative    Not on file           Objective     Vitals:    01/16/20 1328   BP: 110/88   Pulse: 90   Resp: 16   Temp: 97 5 °F (36 4 °C)   TempSrc: Tympanic   SpO2: 99%   Weight: 72 1 kg (159 lb)   Height: 5' 5 5" (1 664 m)     Wt Readings from Last 3 Encounters:   01/16/20 72 1 kg (159 lb)   12/10/19 73 3 kg (161 lb 9 6 oz)   09/13/19 73 kg (161 lb)       Physical Exam   Constitutional: She is oriented to person, place, and time  She appears well-developed and well-nourished  HENT:   Head: Normocephalic and atraumatic  Right Ear: Tympanic membrane normal    Left Ear: Tympanic membrane normal    Nose: Nose normal    Mouth/Throat: Oropharynx is clear and moist  No oropharyngeal exudate  Eyes: Pupils are equal, round, and reactive to light  Conjunctivae are normal    Mild gaze provoked nystagmus   Neck: Neck supple  Carotid bruit is not present  No neck rigidity  No Brudzinski's sign and no Kernig's sign noted  No thyromegaly present     Paraspinal cervical and left trapezius spasm   Cardiovascular: Normal rate, regular rhythm and normal heart sounds  No murmur heard  Pulmonary/Chest: Effort normal and breath sounds normal  No respiratory distress  She has no wheezes  Abdominal: She exhibits no abdominal bruit  Musculoskeletal: Normal range of motion  She exhibits no edema  Lymphadenopathy:     She has no cervical adenopathy  Neurological: She is alert and oriented to person, place, and time  No cranial nerve deficit  Coordination abnormal    Positive Romberg to the right   Skin: Skin is warm  No rash noted  Psychiatric: She has a normal mood and affect  Her behavior is normal    Nursing note and vitals reviewed         Pertinent Laboratory/Diagnostic Studies:  Lab Results   Component Value Date    GLUCOSE 104 08/15/2015    BUN 9 06/08/2019    CREATININE 0 64 06/08/2019    CALCIUM 8 8 06/08/2019     08/15/2015    K 3 9 06/08/2019    CO2 30 06/08/2019     06/08/2019     Lab Results   Component Value Date    ALT 15 06/08/2019    AST 8 06/08/2019    ALKPHOS 105 06/08/2019    BILITOT 0 30 08/15/2015       Lab Results   Component Value Date    WBC 8 11 06/08/2019    HGB 13 3 06/08/2019    HCT 40 1 06/08/2019    MCV 85 06/08/2019     06/08/2019       No results found for: TSH    No results found for: CHOL  No results found for: TRIG  No results found for: HDL  No results found for: [facility]  Lab Results   Component Value Date    HGBA1C 5 2 08/15/2015       Results for orders placed or performed in visit on 06/08/19   Vitamin D 25 hydroxy   Result Value Ref Range    Vit D, 25-Hydroxy 14 7 (L) 30 0 - 100 0 ng/mL   CBC and differential   Result Value Ref Range    WBC 8 11 4 31 - 10 16 Thousand/uL    RBC 4 73 3 81 - 5 12 Million/uL    Hemoglobin 13 3 11 5 - 15 4 g/dL    Hematocrit 40 1 34 8 - 46 1 %    MCV 85 82 - 98 fL    MCH 28 1 26 8 - 34 3 pg    MCHC 33 2 31 4 - 37 4 g/dL    RDW 13 7 11 6 - 15 1 %    MPV 11 8 8 9 - 12 7 fL    Platelets 378 018 - 602 Thousands/uL    nRBC 0 /100 WBCs    Neutrophils Relative 64 43 - 75 %    Immat GRANS % 0 0 - 2 %    Lymphocytes Relative 30 14 - 44 %    Monocytes Relative 5 4 - 12 %    Eosinophils Relative 1 0 - 6 %    Basophils Relative 0 0 - 1 %    Neutrophils Absolute 5 20 1 85 - 7 62 Thousands/µL    Immature Grans Absolute 0 01 0 00 - 0 20 Thousand/uL    Lymphocytes Absolute 2 40 0 60 - 4 47 Thousands/µL    Monocytes Absolute 0 38 0 17 - 1 22 Thousand/µL    Eosinophils Absolute 0 09 0 00 - 0 61 Thousand/µL    Basophils Absolute 0 03 0 00 - 0 10 Thousands/µL   Comprehensive metabolic panel   Result Value Ref Range    Sodium 143 136 - 145 mmol/L    Potassium 3 9 3 5 - 5 3 mmol/L    Chloride 108 100 - 108 mmol/L    CO2 30 21 - 32 mmol/L    ANION GAP 5 4 - 13 mmol/L    BUN 9 5 - 25 mg/dL    Creatinine 0 64 0 60 - 1 30 mg/dL    Glucose, Fasting 100 (H) 65 - 99 mg/dL    Calcium 8 8 8 3 - 10 1 mg/dL    AST 8 5 - 45 U/L    ALT 15 12 - 78 U/L    Alkaline Phosphatase 105 46 - 116 U/L    Total Protein 6 9 6 4 - 8 2 g/dL    Albumin 4 1 3 5 - 5 0 g/dL    Total Bilirubin 0 69 0 20 - 1 00 mg/dL    eGFR 115 ml/min/1 73sq m   TSH, 3rd generation   Result Value Ref Range    TSH 3RD GENERATON 1 100 0 358 - 3 740 uIU/mL   Lipase   Result Value Ref Range    Lipase 102 73 - 393 u/L   Bilirubin, direct   Result Value Ref Range    Bilirubin, Direct 0 14 0 00 - 0 20 mg/dL       Orders Placed This Encounter   Procedures    CT head wo contrast       ALLERGIES:  Allergies   Allergen Reactions    Cefprozil Shortness Of Breath    Pertussis Vaccine     Shellfish-Derived Products Hives    Amoxicillin      Thrush,yeast infection       Current Medications     Current Outpatient Medications   Medication Sig Dispense Refill    cetirizine (ZyrTEC) 10 mg tablet Take 10 mg by mouth daily at bedtime       Cholecalciferol (VITAMIN D) 2000 units CAPS Take by mouth daily      escitalopram (LEXAPRO) 10 mg tablet Take half a tablet once a day for 6 days then take 1 tablet once a day (Patient taking differently: Take 10 mg by mouth daily Taking 1 daily) 30 tablet 3    fluticasone (FLONASE) 50 mcg/act nasal spray 2 sprays into each nostril daily 1 Bottle 0    levonorgestrel (MIRENA) 20 MCG/24HR IUD 1 each by Intrauterine route once      cyclobenzaprine (FLEXERIL) 5 mg tablet Take 1 or 2 tablets at bedtime as needed for painful neck spasm 30 tablet 0    predniSONE 10 mg tablet Take 40 mg (4 tabs) daily x 2 days; then 30 mg (3 tabs) daily x 2 days then 20 mg (2 tabs) daily x 2 days then 10 mg ( 1 tab) dailyx 2 days 20 tablet 0     No current facility-administered medications for this visit          Medications Discontinued During This Encounter   Medication Reason    loratadine (CLARITIN) 10 mg tablet Alternate therapy    methylPREDNISolone 4 MG tablet therapy pack Therapy completed    Promethazine-DM (PHENERGAN-DM) 6 25-15 mg/5 mL oral syrup Therapy completed       Health Maintenance     Health Maintenance   Topic Date Due    Annual Physical  02/22/2020    DTaP,Tdap,and Td Vaccines (6 - Tdap) 12/10/2020 (Originally 8/29/2018)    BMI: Followup Plan  12/10/2020    BMI: Adult  01/16/2021    Cervical Cancer Screening  02/21/2021    Pneumococcal Vaccine: 65+ Years (1 of 2 - PCV13) 07/24/2047    HIV Screening  Completed    Influenza Vaccine  Completed    IPV Vaccine  Completed    Meningococcal ACWY Vaccine  Completed    Pneumococcal Vaccine: Pediatrics (0 to 5 Years) and At-Risk Patients (6 to 59 Years)  Aged Out    HIB Vaccine  Aged Out    Hepatitis B Vaccine  Aged Out    Hepatitis A Vaccine  Aged Out    HPV Vaccine  Aged [employer] History   Administered Date(s) Administered    DT (pediatric) 07/16/1984, 10/11/1993    DTaP 1982, 1982, 1982    DTaP 5 1982, 1982, 1982, 03/28/1983, 08/29/2008    INFLUENZA 10/10/2015, 10/08/2016, 10/17/2017    IPV 01/03/1983, 01/28/1983, 04/25/1983, 07/16/1984, 10/01/1987    Influenza Quadrivalent Preservative Free 3 years and older IM 10/10/2015, 10/08/2016    Influenza, Quadrivalent (nasal) 10/10/2015, 10/08/2016    Influenza, injectable, quadrivalent, preservative free 0 5 mL 10/29/2018, 10/08/2019    MMR 11/23/1983, 10/11/1993    Meningococcal MCV4P 08/02/2000, 08/29/2008    Meningococcal, Unknown Serogroups 08/02/2000, 08/29/2008    Mumps 08/22/1983    Rubella 11/23/1983, 09/21/1984    Tuberculin Skin Test-PPD Intradermal 02/06/2012       Meli Royal MD

## 2020-03-04 ENCOUNTER — ANNUAL EXAM (OUTPATIENT)
Dept: OBGYN CLINIC | Facility: CLINIC | Age: 40
End: 2020-03-04
Payer: COMMERCIAL

## 2020-03-04 VITALS
HEIGHT: 65 IN | BODY MASS INDEX: 27.06 KG/M2 | DIASTOLIC BLOOD PRESSURE: 76 MMHG | WEIGHT: 162.4 LBS | SYSTOLIC BLOOD PRESSURE: 108 MMHG

## 2020-03-04 DIAGNOSIS — R10.31 RIGHT LOWER QUADRANT PAIN: Primary | ICD-10-CM

## 2020-03-04 DIAGNOSIS — N80.9 ENDOMETRIOSIS: ICD-10-CM

## 2020-03-04 PROCEDURE — S0612 ANNUAL GYNECOLOGICAL EXAMINA: HCPCS | Performed by: OBSTETRICS & GYNECOLOGY

## 2020-03-04 NOTE — PATIENT INSTRUCTIONS
The patient is informed of a stable gyn examination  I am concerned that she might have recurrence of endometriosis  However now I further consultation Dr Edgardo Hannon for possible diagnostic laparoscopy and possible right oophorectomy , we will also do a pelvic ultrasound including a transvaginal ultrasound to look for any other pelvic pathology

## 2020-03-04 NOTE — PROGRESS NOTES
Assessment/Plan:    The patient was informed of a stable gyn examination  There is tenderness in the right adnexa strongly suspicious for endometriosis  She had an ultrasound back in October watch her showing a right ovarian cyst   The pain is not getting worse with intercourse  Most the time the pain is under control on a pain scale of 2  There occasion the pain get up to an 8  We had a discussion about endometriosis  Her childbearing is complete  I would like for her consultation with Dr Lauren Allen for the possibility of a right oophorectomy or even the possibility of hysterectomy to help with her severe endometriosis of the past   She is happy with her weight  Her interstitial cystitis is under control  There is no problem with depression or anxiety  She has a dentist on a regular basis  She will be informed results of this ultrasound  And hopefully she will make a consultation as we discussed above  Subjective:      Patient ID: Bhupendra Kaur is a 42 y o  female  HPI    This is a 53-year-old white female, she is a  6 para 2 with 1  section for twins followed by repeat  section the 2nd time  Her current method of contraception includes the tubal ligation  She has a history of endometriosis in the past has a history of abnormal uterine bleeding  She had an intrauterine device placed in May of 2018  The bleeding issue has resolved  She still has some pain in the right lower quadrant consistent possible endometriosis  Her last severe attack was in 2019  And there was evidence of a right ovarian cyst which I suspect are recurrence of endometrioma  The pain comes and goes not interfering with her lifestyle the present time  She is still sexually active  There is no problem with that  There is no major  GI complaint  She does have a history of interstitial cystitis which is under control at the present time  She is not happy with her weight  She is exercising tried lose more  Denies any problem with depression or anxiety  There are no new major family illnesses report at this time  The following portions of the patient's history were reviewed and updated as appropriate: allergies, current medications, past family history, past medical history, past social history, past surgical history and problem list     Review of Systems   Genitourinary:        Right lower quadrant pain suspicious for endometriosis         Objective:      /76   Ht 5' 5" (1 651 m)   Wt 73 7 kg (162 lb 6 4 oz)   BMI 27 02 kg/m²          Physical Exam   Constitutional: She is oriented to person, place, and time  She appears well-developed and well-nourished  HENT:   Head: Atraumatic  Eyes: EOM are normal    Neck: Normal range of motion  Neck supple  No JVD present  No tracheal deviation present  No thyromegaly present  Cardiovascular: Normal rate, regular rhythm and normal heart sounds  Pulmonary/Chest: Effort normal and breath sounds normal  No stridor  No respiratory distress  She has no wheezes  She has no rales  She exhibits no tenderness  Abdominal: Soft  Bowel sounds are normal  She exhibits no distension and no mass  There is tenderness  There is no rebound and no guarding  No hernia  Hernia confirmed negative in the right inguinal area and confirmed negative in the left inguinal area  Genitourinary: Rectum normal, vagina normal and uterus normal  No labial fusion  There is no rash, tenderness, lesion or injury on the right labia  There is no rash, tenderness, lesion or injury on the left labia  Uterus is not deviated, not enlarged, not fixed and not tender  Cervix exhibits motion tenderness  Cervix exhibits no discharge and no friability  Right adnexum displays tenderness  Right adnexum displays no mass and no fullness  Left adnexum displays no mass, no tenderness and no fullness  No erythema, tenderness or bleeding in the vagina   No foreign body in the vagina  No signs of injury around the vagina  No vaginal discharge found  Genitourinary Comments: The patient is tender on the right adnexa where her pain was back in October 2019  There is no fullness  I do believe she is more endometriosis implants on the right side  It should be remembered her appendix is absent  A Pap smear was not performed  An IUD string could not be visualized today's visit  Musculoskeletal: Normal range of motion  Lymphadenopathy:     She has no cervical adenopathy  No inguinal adenopathy noted on the right or left side  Neurological: She is alert and oriented to person, place, and time  Skin: Skin is warm and dry  Psychiatric: She has a normal mood and affect   Her behavior is normal  Thought content normal

## 2020-04-29 ENCOUNTER — TELEMEDICINE (OUTPATIENT)
Dept: FAMILY MEDICINE CLINIC | Facility: CLINIC | Age: 40
End: 2020-04-29
Payer: COMMERCIAL

## 2020-04-29 VITALS — WEIGHT: 165 LBS | BODY MASS INDEX: 27.49 KG/M2 | TEMPERATURE: 98.6 F | HEIGHT: 65 IN

## 2020-04-29 DIAGNOSIS — F41.9 ANXIETY AND DEPRESSION: ICD-10-CM

## 2020-04-29 DIAGNOSIS — F32.A ANXIETY AND DEPRESSION: ICD-10-CM

## 2020-04-29 DIAGNOSIS — F17.200 TOBACCO DEPENDENCE: Primary | ICD-10-CM

## 2020-04-29 PROCEDURE — 99214 OFFICE O/P EST MOD 30 MIN: CPT | Performed by: FAMILY MEDICINE

## 2020-04-29 PROCEDURE — 3008F BODY MASS INDEX DOCD: CPT | Performed by: FAMILY MEDICINE

## 2020-04-29 RX ORDER — PSEUDOEPHEDRINE HCL 120 MG/1
120 TABLET, FILM COATED, EXTENDED RELEASE ORAL 2 TIMES DAILY
COMMUNITY

## 2020-04-29 RX ORDER — VARENICLINE TARTRATE 1 MG/1
1 TABLET, FILM COATED ORAL 2 TIMES DAILY
Qty: 60 TABLET | Refills: 4 | Status: SHIPPED | OUTPATIENT
Start: 2020-04-29 | End: 2021-11-15 | Stop reason: ALTCHOICE

## 2020-04-29 RX ORDER — VARENICLINE TARTRATE 25 MG
KIT ORAL
Qty: 53 TABLET | Refills: 0 | Status: SHIPPED | OUTPATIENT
Start: 2020-04-29 | End: 2021-11-15 | Stop reason: ALTCHOICE

## 2020-04-29 RX ORDER — ESCITALOPRAM OXALATE 10 MG/1
TABLET ORAL
Qty: 30 TABLET | Refills: 5 | Status: SHIPPED | OUTPATIENT
Start: 2020-04-29 | End: 2020-10-26

## 2020-06-02 ENCOUNTER — TELEPHONE (OUTPATIENT)
Dept: FAMILY MEDICINE CLINIC | Facility: CLINIC | Age: 40
End: 2020-06-02

## 2020-09-17 ENCOUNTER — TELEPHONE (OUTPATIENT)
Dept: FAMILY MEDICINE CLINIC | Facility: CLINIC | Age: 40
End: 2020-09-17

## 2020-09-17 ENCOUNTER — TELEMEDICINE (OUTPATIENT)
Dept: FAMILY MEDICINE CLINIC | Facility: CLINIC | Age: 40
End: 2020-09-17
Payer: COMMERCIAL

## 2020-09-17 DIAGNOSIS — Z20.828 EXPOSURE TO SARS-ASSOCIATED CORONAVIRUS: Primary | ICD-10-CM

## 2020-09-17 DIAGNOSIS — J01.91 ACUTE RECURRENT SINUSITIS, UNSPECIFIED LOCATION: ICD-10-CM

## 2020-09-17 PROCEDURE — 99214 OFFICE O/P EST MOD 30 MIN: CPT | Performed by: FAMILY MEDICINE

## 2020-09-17 PROCEDURE — 1036F TOBACCO NON-USER: CPT | Performed by: FAMILY MEDICINE

## 2020-09-17 RX ORDER — DOXYCYCLINE HYCLATE 100 MG/1
100 CAPSULE ORAL
Qty: 20 CAPSULE | Refills: 0 | Status: SHIPPED | OUTPATIENT
Start: 2020-09-17 | End: 2020-09-27

## 2020-09-17 NOTE — PROGRESS NOTES
COVID-19 Virtual Visit     Assessment/Plan:    Problem List Items Addressed This Visit     None      Visit Diagnoses     Exposure to SARS-associated coronavirus    -  Primary    Relevant Orders    Novel Coronavirus (COVID-19), PCR [facility] - Collected at [facility] or [facility]    Acute recurrent sinusitis, unspecified location        Relevant Medications    doxycycline hyclate (VIBRAMYCIN) 100 mg capsule        This virtual check-in was done via [facility] and patient was informed that this is not a secure, HIPAA-complaint platform  She agrees to proceed     Disposition:      I referred Neda Arrieta to one of our centralized sites for a COVID-19 swab  Patient presents due to possible exposure to COVID-19  She will proceed with testing at [facility] site at [employer] tomorrow morning  Patient is afebrile and denies symptoms of cough, chest tightness or GI symptoms  She has been experiencing headache, postnasal drip and sinus pressure within past few weeks  I will start her on doxycycline 100 mg twice a day for 10 days due to symptoms of possible sinusitis  I will be contacting patient regarding COVID-19 testing  She is awaiting results of COVID-19 test of her daughter from earlier today  I spent 18 minutes directly with the patient during this visit    Encounter provider Roya Valdez MD    Provider located at [address] [place] [id number]    Recent Visits  No visits were found meeting these conditions  Showing recent visits within past 7 days and meeting all other requirements     Today's Visits  Date Type Provider Dept   09/17/20 Telemedicine Roya Valdez, [facility] [place]   09/17/20 Telephone [address]   Showing today's visits and meeting all other requirements     Future Appointments  No visits were found meeting these conditions     Showing future appointments within next 150 days and meeting all other requirements        Patient agrees to participate in a virtual check in via telephone or video visit instead of presenting to the office to address urgent/immediate medical needs  Patient is aware this is a billable service  After connecting through [employer], the patient was identified by name and date of birth  Theron Gordillo was informed that this was a telemedicine visit and that the exam was being conducted confidentially over secure lines  My office door was closed  No one else was in the room  Theron Gordillo acknowledged consent and understanding of privacy and security of the telemedicine visit  I informed the patient that I have reviewed her record in Epic and presented the opportunity for her to ask any questions regarding the visit today  The patient agreed to participate  Subjective  Theron Gordillo is a 47 y o  female who is concerned about COVID-19  She reports headache, fatigue and sinus pressure, postnasal drip, occasional sneezing, ear pressure  She has not experienced fever, chills, repeated shaking with chills, cough, shortness of breath, anorexia, myalgias, anosmia, abdominal pain, diarrhea, nausea and vomiting She has not had contact with a person who is under investigation for or who is positive for COVID-19 within the last 14 days  She has not been hospitalized recently for fever and/or lower respiratory symptoms  Patient presents due to concern of possible COVID-19 exposure  She is a mother of 3 children  Her daughter was diagnosed with fever and bilateral ear infection 2 days ago  Her 2nd daughter was seen by pediatrician today for evaluation of viral illness including fever, cough, no known etiology  Child was referred for COVID-19 testing today by pediatrician, results are pending  Patient's 3rd child, son, attends  and reportedly has been exposed to COVID-19 positive person  Patient's son is completely asymptomatic with no fever, cold or cough  Patient's pediatrician suggested that patient should be also tested for COVID-19  She denies symptoms of fever, cough, chest tightness diarrhea, nausea vomiting  She has been experiencing recurrent symptoms of sinus pressure, postnasal drip, ear discomfort that a typical for her  She has been using Zyrtec D with partial but incomplete improvement    Patient believes that her symptoms are likely consistent with typical sinusitis that she had experienced many times in the past     Past Medical History:   Diagnosis Date    Anxiety and depression     Chronic kidney disease     [id number]    Diabetes mellitus ([address] )     gd last preg    Disease of thyroid gland     nodule    Endometriosis     Female infertility     iui with prior preg    Generalized anxiety disorder     last assessed 14    Renal calculi     last assessed 10/20/14; US 10/2014 3mm and 4mm right, non obstructing    Varicella     childhood       Past Surgical History:   Procedure Laterality Date    APPENDECTOMY       SECTION      DIAGNOSTIC LAPAROSCOPY      multiple, last 10/2012, 10/2014    LAPAROSCOPIC OVARIAN CYSTECTOMY      LASER LAPAROSCOPY      NASAL SEPTUM SURGERY      TN  DELIVERY ONLY N/A 2017    Procedure:  SECTION () REPEAT;  Surgeon: Keegan Garza DO;  [facility]: [facility];  Service: Obstetrics    TN LIGATION,FALLOPIAN TUBE W/ Bilateral 2017    Procedure: LIGATION/COAGULATION TUBAL;  Surgeon: Keegan Garza DO;  [facility];  Service: Obstetrics       Current Outpatient Medications   Medication Sig Dispense Refill    cetirizine (ZyrTEC) 10 mg tablet Take 10 mg by mouth daily at bedtime       Cholecalciferol (VITAMIN D) 2000 units CAPS Take by mouth daily      doxycycline hyclate (VIBRAMYCIN) 100 mg capsule Take 1 capsule (100 mg total) by mouth 2 (two) times daily after meals for 10 days 20 capsule 0    escitalopram (LEXAPRO) 10 mg tablet TAKE 1 TABLET BY MOUTH EVERY DAY 30 tablet 5    fluticasone (FLONASE) 50 mcg/act nasal spray 2 sprays into each nostril daily 1 Bottle 0    levonorgestrel (MIRENA) 20 MCG/24HR IUD 1 each by Intrauterine route once      pseudoephedrine (SUDAFED) 120 MG 12 hr tablet Take 120 mg by mouth 2 (two) times a day PRN      varenicline (CHANTIX CARL) 0 5 MG X 11 & 1 MG X 42 tablet Take one 0 5 mg tablet by mouth once daily for 3 days, then one 0 5 mg tablet by mouth twice daily for 4 days, then one 1 mg tablet by mouth twice daily  53 tablet 0    varenicline (CHANTIX) 1 mg tablet Take 1 tablet (1 mg total) by mouth 2 (two) times a day 60 tablet 4     No current facility-administered medications for this visit  Allergies   Allergen Reactions    Cefprozil Shortness Of Breath    Pertussis Vaccine     Shellfish-Derived Products Hives    Amoxicillin      Thrush,yeast infection       Review of Systems   Constitutional: Negative  HENT: Positive for congestion, ear pain, postnasal drip and sinus pressure  Eyes: Negative  Respiratory: Negative  Cardiovascular: Negative  Gastrointestinal: Negative  Endocrine: Negative  Genitourinary: Negative  Musculoskeletal: Negative  Neurological: Negative  Hematological: Negative  Psychiatric/Behavioral: Negative  Video Exam    There were no vitals filed for this visit  Andreia Ferris appears alert, no distress  Physical Exam  Constitutional:       Appearance: Normal appearance  Pulmonary:      Effort: Pulmonary effort is normal       Comments: Unlabored breathing, no cough throughout exam  Musculoskeletal:      Right lower leg: No edema  Left lower leg: No edema  Neurological:      General: No focal deficit present  Mental Status: She is alert and oriented to person, place, and time  Psychiatric:         Mood and Affect: Mood normal          Behavior: Behavior normal          Thought Content:  Thought content normal           VIRTUAL VISIT DISCLAIMER    Malou LITA Fink acknowledges that she has consented to an online visit or consultation  She understands that the online visit is based solely on information provided by her, and that, in the absence of a face-to-face physical evaluation by the physician, the diagnosis she receives is both limited and provisional in terms of accuracy and completeness  This is not intended to replace a full medical face-to-face evaluation by the physician  Malou Fink understands and accepts these terms

## 2020-09-17 NOTE — TELEPHONE ENCOUNTER
Patient's daughter is currently being tested for COVID she would like to know is she can have a test as well to be tested with her daughter  Please advise patient

## 2020-09-17 NOTE — TELEPHONE ENCOUNTER
Please schedule video visit, okay to do it by the end of  today at 5:00 p m  or set up with any other physician nurse practitioner

## 2020-09-18 DIAGNOSIS — Z20.828 EXPOSURE TO SARS-ASSOCIATED CORONAVIRUS: ICD-10-CM

## 2020-09-18 PROCEDURE — U0003 INFECTIOUS AGENT DETECTION BY NUCLEIC ACID (DNA OR RNA); SEVERE ACUTE RESPIRATORY SYNDROME CORONAVIRUS 2 (SARS-COV-2) (CORONAVIRUS DISEASE [COVID-19]), AMPLIFIED PROBE TECHNIQUE, MAKING USE OF HIGH THROUGHPUT TECHNOLOGIES AS DESCRIBED BY CMS-2020-01-R: HCPCS | Performed by: FAMILY MEDICINE

## 2020-09-19 LAB — SARS-COV-2 RNA SPEC QL NAA+PROBE: NOT DETECTED

## 2020-09-22 ENCOUNTER — TELEPHONE (OUTPATIENT)
Dept: FAMILY MEDICINE CLINIC | Facility: CLINIC | Age: 40
End: 2020-09-22

## 2020-09-22 NOTE — TELEPHONE ENCOUNTER
From   Kayley Ro MD  To   Malou LONDON Sheets  Sent   9/20/2020 12:32 PM    Hayder Westfall,   Coronavirus testing is negative     Thank you

## 2020-10-24 ENCOUNTER — IMMUNIZATIONS (OUTPATIENT)
Dept: FAMILY MEDICINE CLINIC | Facility: CLINIC | Age: 40
End: 2020-10-24
Payer: COMMERCIAL

## 2020-10-24 DIAGNOSIS — Z23 INFLUENZA VACCINE NEEDED: Primary | ICD-10-CM

## 2020-10-24 PROCEDURE — 90686 IIV4 VACC NO PRSV 0.5 ML IM: CPT | Performed by: FAMILY MEDICINE

## 2020-10-24 PROCEDURE — 90471 IMMUNIZATION ADMIN: CPT | Performed by: FAMILY MEDICINE

## 2020-10-26 DIAGNOSIS — F32.A ANXIETY AND DEPRESSION: ICD-10-CM

## 2020-10-26 DIAGNOSIS — F41.9 ANXIETY AND DEPRESSION: ICD-10-CM

## 2020-10-26 RX ORDER — ESCITALOPRAM OXALATE 10 MG/1
TABLET ORAL
Qty: 30 TABLET | Refills: 5 | Status: SHIPPED | OUTPATIENT
Start: 2020-10-26 | End: 2021-04-21

## 2020-12-03 VITALS — TEMPERATURE: 98 F

## 2020-12-18 ENCOUNTER — APPOINTMENT (EMERGENCY)
Dept: CT IMAGING | Facility: HOSPITAL | Age: 40
End: 2020-12-18
Payer: COMMERCIAL

## 2020-12-18 ENCOUNTER — APPOINTMENT (EMERGENCY)
Dept: RADIOLOGY | Facility: HOSPITAL | Age: 40
End: 2020-12-18
Payer: COMMERCIAL

## 2020-12-18 ENCOUNTER — HOSPITAL ENCOUNTER (EMERGENCY)
Facility: HOSPITAL | Age: 40
Discharge: HOME | End: 2020-12-18
Attending: EMERGENCY MEDICINE | Admitting: EMERGENCY MEDICINE
Payer: COMMERCIAL

## 2020-12-18 VITALS
WEIGHT: 167 LBS | RESPIRATION RATE: 18 BRPM | TEMPERATURE: 99.3 F | HEART RATE: 108 BPM | OXYGEN SATURATION: 98 % | DIASTOLIC BLOOD PRESSURE: 65 MMHG | SYSTOLIC BLOOD PRESSURE: 130 MMHG | BODY MASS INDEX: 27.82 KG/M2 | HEIGHT: 65 IN

## 2020-12-18 DIAGNOSIS — R19.7 DIARRHEA, UNSPECIFIED TYPE: ICD-10-CM

## 2020-12-18 DIAGNOSIS — R10.13 EPIGASTRIC PAIN: Primary | ICD-10-CM

## 2020-12-18 DIAGNOSIS — K52.9 GASTROENTERITIS: ICD-10-CM

## 2020-12-18 DIAGNOSIS — R11.2 NON-INTRACTABLE VOMITING WITH NAUSEA, UNSPECIFIED VOMITING TYPE: ICD-10-CM

## 2020-12-18 LAB
ALBUMIN SERPL BCP-MCNC: 3.9 G/DL (ref 3.5–5)
ALP SERPL-CCNC: 108 U/L (ref 46–116)
ALT SERPL W P-5'-P-CCNC: 22 U/L (ref 12–78)
ANION GAP SERPL CALCULATED.3IONS-SCNC: 6 MMOL/L (ref 4–13)
AST SERPL W P-5'-P-CCNC: 13 U/L (ref 5–45)
ATRIAL RATE: 115 BPM
BACTERIA UR QL AUTO: NORMAL /HPF
BASOPHILS # BLD AUTO: 0.04 THOUSANDS/ΜL (ref 0–0.1)
BASOPHILS NFR BLD AUTO: 0 % (ref 0–1)
BILIRUB SERPL-MCNC: 0.6 MG/DL (ref 0.2–1)
BILIRUB UR QL STRIP: NEGATIVE
BUN SERPL-MCNC: 9 MG/DL (ref 5–25)
CALCIUM SERPL-MCNC: 8.6 MG/DL (ref 8.3–10.1)
CHLORIDE SERPL-SCNC: 99 MMOL/L (ref 100–108)
CLARITY UR: CLEAR
CO2 SERPL-SCNC: 30 MMOL/L (ref 21–32)
COLOR UR: ABNORMAL
CREAT SERPL-MCNC: 0.61 MG/DL (ref 0.6–1.3)
EOSINOPHIL # BLD AUTO: 0.02 THOUSAND/ΜL (ref 0–0.61)
EOSINOPHIL NFR BLD AUTO: 0 % (ref 0–6)
ERYTHROCYTE [DISTWIDTH] IN BLOOD BY AUTOMATED COUNT: 13.5 % (ref 11.6–15.1)
EXT PREG TEST URINE: NEGATIVE
EXT. CONTROL ED NAV: NORMAL
FLUAV RNA RESP QL NAA+PROBE: NEGATIVE
FLUBV RNA RESP QL NAA+PROBE: NEGATIVE
GFR SERPL CREATININE-BSD FRML MDRD: 115 ML/MIN/1.73SQ M
GLUCOSE SERPL-MCNC: 103 MG/DL (ref 65–140)
GLUCOSE UR STRIP-MCNC: NEGATIVE MG/DL
HCT VFR BLD AUTO: 42.5 % (ref 34.8–46.1)
HGB BLD-MCNC: 14.5 G/DL (ref 11.5–15.4)
HGB UR QL STRIP.AUTO: ABNORMAL
IMM GRANULOCYTES # BLD AUTO: 0.04 THOUSAND/UL (ref 0–0.2)
IMM GRANULOCYTES NFR BLD AUTO: 0 % (ref 0–2)
KETONES UR STRIP-MCNC: ABNORMAL MG/DL
LACTATE SERPL-SCNC: 0.9 MMOL/L (ref 0.5–2)
LEUKOCYTE ESTERASE UR QL STRIP: NEGATIVE
LIPASE SERPL-CCNC: 56 U/L (ref 73–393)
LYMPHOCYTES # BLD AUTO: 1.22 THOUSANDS/ΜL (ref 0.6–4.47)
LYMPHOCYTES NFR BLD AUTO: 12 % (ref 14–44)
MCH RBC QN AUTO: 28.1 PG (ref 26.8–34.3)
MCHC RBC AUTO-ENTMCNC: 34.1 G/DL (ref 31.4–37.4)
MCV RBC AUTO: 82 FL (ref 82–98)
MONOCYTES # BLD AUTO: 0.71 THOUSAND/ΜL (ref 0.17–1.22)
MONOCYTES NFR BLD AUTO: 7 % (ref 4–12)
NEUTROPHILS # BLD AUTO: 8.14 THOUSANDS/ΜL (ref 1.85–7.62)
NEUTS SEG NFR BLD AUTO: 81 % (ref 43–75)
NITRITE UR QL STRIP: NEGATIVE
NON-SQ EPI CELLS URNS QL MICRO: NORMAL /HPF
NRBC BLD AUTO-RTO: 0 /100 WBCS
P AXIS: 57 DEGREES
PH UR STRIP.AUTO: 6 [PH]
PLATELET # BLD AUTO: 234 THOUSANDS/UL (ref 149–390)
PMV BLD AUTO: 10 FL (ref 8.9–12.7)
POTASSIUM SERPL-SCNC: 3.5 MMOL/L (ref 3.5–5.3)
PR INTERVAL: 140 MS
PROCALCITONIN SERPL-MCNC: 0.08 NG/ML
PROT SERPL-MCNC: 7.6 G/DL (ref 6.4–8.2)
PROT UR STRIP-MCNC: ABNORMAL MG/DL
QRS AXIS: -13 DEGREES
QRSD INTERVAL: 94 MS
QT INTERVAL: 304 MS
QTC INTERVAL: 420 MS
RBC # BLD AUTO: 5.16 MILLION/UL (ref 3.81–5.12)
RBC #/AREA URNS AUTO: NORMAL /HPF
RSV RNA RESP QL NAA+PROBE: NEGATIVE
SARS-COV-2 RNA RESP QL NAA+PROBE: NEGATIVE
SODIUM SERPL-SCNC: 135 MMOL/L (ref 136–145)
SP GR UR STRIP.AUTO: 1.02 (ref 1–1.03)
T WAVE AXIS: -1 DEGREES
TROPONIN I SERPL-MCNC: <0.02 NG/ML
UROBILINOGEN UR QL STRIP.AUTO: 1 E.U./DL
VENTRICULAR RATE: 115 BPM
WBC # BLD AUTO: 10.17 THOUSAND/UL (ref 4.31–10.16)
WBC #/AREA URNS AUTO: NORMAL /HPF

## 2020-12-18 PROCEDURE — 96361 HYDRATE IV INFUSION ADD-ON: CPT

## 2020-12-18 PROCEDURE — 99285 EMERGENCY DEPT VISIT HI MDM: CPT

## 2020-12-18 PROCEDURE — 71045 X-RAY EXAM CHEST 1 VIEW: CPT

## 2020-12-18 PROCEDURE — 83690 ASSAY OF LIPASE: CPT | Performed by: EMERGENCY MEDICINE

## 2020-12-18 PROCEDURE — 99284 EMERGENCY DEPT VISIT MOD MDM: CPT | Performed by: EMERGENCY MEDICINE

## 2020-12-18 PROCEDURE — 93005 ELECTROCARDIOGRAM TRACING: CPT

## 2020-12-18 PROCEDURE — 80053 COMPREHEN METABOLIC PANEL: CPT | Performed by: EMERGENCY MEDICINE

## 2020-12-18 PROCEDURE — 96374 THER/PROPH/DIAG INJ IV PUSH: CPT

## 2020-12-18 PROCEDURE — 96375 TX/PRO/DX INJ NEW DRUG ADDON: CPT

## 2020-12-18 PROCEDURE — 83605 ASSAY OF LACTIC ACID: CPT | Performed by: EMERGENCY MEDICINE

## 2020-12-18 PROCEDURE — 36415 COLL VENOUS BLD VENIPUNCTURE: CPT | Performed by: EMERGENCY MEDICINE

## 2020-12-18 PROCEDURE — G1004 CDSM NDSC: HCPCS

## 2020-12-18 PROCEDURE — 0241U HB NFCT DS VIR RESP RNA 4 TRGT: CPT | Performed by: EMERGENCY MEDICINE

## 2020-12-18 PROCEDURE — 93010 ELECTROCARDIOGRAM REPORT: CPT | Performed by: INTERNAL MEDICINE

## 2020-12-18 PROCEDURE — 81001 URINALYSIS AUTO W/SCOPE: CPT | Performed by: EMERGENCY MEDICINE

## 2020-12-18 PROCEDURE — 81025 URINE PREGNANCY TEST: CPT | Performed by: EMERGENCY MEDICINE

## 2020-12-18 PROCEDURE — 87040 BLOOD CULTURE FOR BACTERIA: CPT | Performed by: EMERGENCY MEDICINE

## 2020-12-18 PROCEDURE — 74177 CT ABD & PELVIS W/CONTRAST: CPT

## 2020-12-18 PROCEDURE — 85025 COMPLETE CBC W/AUTO DIFF WBC: CPT | Performed by: EMERGENCY MEDICINE

## 2020-12-18 PROCEDURE — 84145 PROCALCITONIN (PCT): CPT | Performed by: EMERGENCY MEDICINE

## 2020-12-18 PROCEDURE — 84484 ASSAY OF TROPONIN QUANT: CPT | Performed by: EMERGENCY MEDICINE

## 2020-12-18 RX ORDER — ONDANSETRON 4 MG/1
4 TABLET, ORALLY DISINTEGRATING ORAL EVERY 6 HOURS PRN
Qty: 20 TABLET | Refills: 0 | Status: SHIPPED | OUTPATIENT
Start: 2020-12-18 | End: 2022-01-31

## 2020-12-18 RX ORDER — KETOROLAC TROMETHAMINE 30 MG/ML
15 INJECTION, SOLUTION INTRAMUSCULAR; INTRAVENOUS ONCE
Status: COMPLETED | OUTPATIENT
Start: 2020-12-18 | End: 2020-12-18

## 2020-12-18 RX ORDER — METOCLOPRAMIDE HYDROCHLORIDE 5 MG/ML
10 INJECTION INTRAMUSCULAR; INTRAVENOUS ONCE
Status: COMPLETED | OUTPATIENT
Start: 2020-12-18 | End: 2020-12-18

## 2020-12-18 RX ORDER — DIPHENHYDRAMINE HYDROCHLORIDE 50 MG/ML
25 INJECTION INTRAMUSCULAR; INTRAVENOUS ONCE
Status: COMPLETED | OUTPATIENT
Start: 2020-12-18 | End: 2020-12-18

## 2020-12-18 RX ORDER — FAMOTIDINE 20 MG/1
20 TABLET, FILM COATED ORAL 2 TIMES DAILY
Qty: 30 TABLET | Refills: 0 | Status: SHIPPED | OUTPATIENT
Start: 2020-12-18 | End: 2021-11-15 | Stop reason: ALTCHOICE

## 2020-12-18 RX ORDER — FAMOTIDINE 20 MG/1
20 TABLET, FILM COATED ORAL ONCE
Status: COMPLETED | OUTPATIENT
Start: 2020-12-18 | End: 2020-12-18

## 2020-12-18 RX ORDER — MORPHINE SULFATE 4 MG/ML
4 INJECTION, SOLUTION INTRAMUSCULAR; INTRAVENOUS ONCE
Status: COMPLETED | OUTPATIENT
Start: 2020-12-18 | End: 2020-12-18

## 2020-12-18 RX ADMIN — MORPHINE SULFATE 4 MG: 4 INJECTION INTRAVENOUS at 11:44

## 2020-12-18 RX ADMIN — METOCLOPRAMIDE HYDROCHLORIDE 10 MG: 5 INJECTION INTRAMUSCULAR; INTRAVENOUS at 11:41

## 2020-12-18 RX ADMIN — KETOROLAC TROMETHAMINE 15 MG: 30 INJECTION, SOLUTION INTRAMUSCULAR at 14:46

## 2020-12-18 RX ADMIN — SODIUM CHLORIDE 1000 ML: 0.9 INJECTION, SOLUTION INTRAVENOUS at 11:41

## 2020-12-18 RX ADMIN — FAMOTIDINE 20 MG: 20 TABLET ORAL at 14:42

## 2020-12-18 RX ADMIN — DIPHENHYDRAMINE HYDROCHLORIDE 25 MG: 50 INJECTION, SOLUTION INTRAMUSCULAR; INTRAVENOUS at 14:44

## 2020-12-18 RX ADMIN — IOHEXOL 100 ML: 350 INJECTION, SOLUTION INTRAVENOUS at 13:23

## 2020-12-22 ENCOUNTER — TELEPHONE (OUTPATIENT)
Dept: FAMILY MEDICINE CLINIC | Facility: CLINIC | Age: 40
End: 2020-12-22

## 2020-12-22 ENCOUNTER — TELEMEDICINE (OUTPATIENT)
Dept: FAMILY MEDICINE CLINIC | Facility: CLINIC | Age: 40
End: 2020-12-22
Payer: COMMERCIAL

## 2020-12-22 DIAGNOSIS — R82.90 ABNORMAL FINDING IN URINE: ICD-10-CM

## 2020-12-22 DIAGNOSIS — R31.9 HEMATURIA, UNSPECIFIED TYPE: ICD-10-CM

## 2020-12-22 DIAGNOSIS — R82.90 ABNORMAL FINDING IN URINE: Primary | ICD-10-CM

## 2020-12-22 DIAGNOSIS — G44.209 TENSION HEADACHE: ICD-10-CM

## 2020-12-22 DIAGNOSIS — R11.0 NAUSEA: ICD-10-CM

## 2020-12-22 DIAGNOSIS — R10.13 EPIGASTRIC PAIN: Primary | ICD-10-CM

## 2020-12-22 DIAGNOSIS — N20.0 KIDNEY STONES: ICD-10-CM

## 2020-12-22 LAB
BACTERIA UR QL AUTO: ABNORMAL /HPF
BILIRUB UR QL STRIP: NEGATIVE
CLARITY UR: CLEAR
COLOR UR: YELLOW
GLUCOSE UR STRIP-MCNC: NEGATIVE MG/DL
HGB UR QL STRIP.AUTO: ABNORMAL
HYALINE CASTS #/AREA URNS LPF: ABNORMAL /LPF
KETONES UR STRIP-MCNC: NEGATIVE MG/DL
LEUKOCYTE ESTERASE UR QL STRIP: ABNORMAL
NITRITE UR QL STRIP: NEGATIVE
NON-SQ EPI CELLS URNS QL MICRO: ABNORMAL /HPF
PH UR STRIP.AUTO: 8 [PH]
PROT UR STRIP-MCNC: NEGATIVE MG/DL
RBC #/AREA URNS AUTO: ABNORMAL /HPF
SL AMB  POCT GLUCOSE, UA: ABNORMAL
SL AMB LEUKOCYTE ESTERASE,UA: ABNORMAL
SL AMB POCT BILIRUBIN,UA: ABNORMAL
SL AMB POCT BLOOD,UA: ABNORMAL
SL AMB POCT CLARITY,UA: CLEAR
SL AMB POCT COLOR,UA: YELLOW
SL AMB POCT KETONES,UA: ABNORMAL
SL AMB POCT NITRITE,UA: ABNORMAL
SL AMB POCT PH,UA: 6.5
SL AMB POCT SPECIFIC GRAVITY,UA: 1
SL AMB POCT URINE PROTEIN: ABNORMAL
SL AMB POCT UROBILINOGEN: 0.2
SP GR UR STRIP.AUTO: 1.01 (ref 1–1.03)
UROBILINOGEN UR QL STRIP.AUTO: 1 E.U./DL
WBC #/AREA URNS AUTO: ABNORMAL /HPF

## 2020-12-22 PROCEDURE — 87086 URINE CULTURE/COLONY COUNT: CPT | Performed by: FAMILY MEDICINE

## 2020-12-22 PROCEDURE — 1036F TOBACCO NON-USER: CPT | Performed by: FAMILY MEDICINE

## 2020-12-22 PROCEDURE — 81001 URINALYSIS AUTO W/SCOPE: CPT | Performed by: FAMILY MEDICINE

## 2020-12-22 PROCEDURE — 99214 OFFICE O/P EST MOD 30 MIN: CPT | Performed by: FAMILY MEDICINE

## 2020-12-22 PROCEDURE — 81003 URINALYSIS AUTO W/O SCOPE: CPT | Performed by: FAMILY MEDICINE

## 2020-12-22 RX ORDER — PANTOPRAZOLE SODIUM 40 MG/1
40 TABLET, DELAYED RELEASE ORAL DAILY
Qty: 30 TABLET | Refills: 0 | Status: SHIPPED | OUTPATIENT
Start: 2020-12-22 | End: 2021-01-13

## 2020-12-22 RX ORDER — METHOCARBAMOL 750 MG/1
750 TABLET, FILM COATED ORAL 3 TIMES DAILY PRN
Qty: 30 TABLET | Refills: 0 | Status: SHIPPED | OUTPATIENT
Start: 2020-12-22 | End: 2022-01-31

## 2020-12-23 LAB
BACTERIA BLD CULT: NORMAL
BACTERIA BLD CULT: NORMAL
BACTERIA UR CULT: NORMAL

## 2020-12-24 ENCOUNTER — TELEPHONE (OUTPATIENT)
Dept: FAMILY MEDICINE CLINIC | Facility: CLINIC | Age: 40
End: 2020-12-24

## 2021-01-07 ENCOUNTER — CONSULT (OUTPATIENT)
Dept: GYNECOLOGY | Facility: CLINIC | Age: 41
End: 2021-01-07
Payer: COMMERCIAL

## 2021-01-07 VITALS
WEIGHT: 166 LBS | BODY MASS INDEX: 27.66 KG/M2 | HEART RATE: 97 BPM | DIASTOLIC BLOOD PRESSURE: 78 MMHG | HEIGHT: 65 IN | SYSTOLIC BLOOD PRESSURE: 110 MMHG

## 2021-01-07 DIAGNOSIS — G89.29 CHRONIC PELVIC PAIN IN FEMALE: ICD-10-CM

## 2021-01-07 DIAGNOSIS — N30.10 INTERSTITIAL CYSTITIS: ICD-10-CM

## 2021-01-07 DIAGNOSIS — R10.2 CHRONIC PELVIC PAIN IN FEMALE: ICD-10-CM

## 2021-01-07 DIAGNOSIS — N80.9 ENDOMETRIOSIS: Primary | ICD-10-CM

## 2021-01-07 PROCEDURE — 1036F TOBACCO NON-USER: CPT | Performed by: OBSTETRICS & GYNECOLOGY

## 2021-01-07 PROCEDURE — 3008F BODY MASS INDEX DOCD: CPT | Performed by: OBSTETRICS & GYNECOLOGY

## 2021-01-07 PROCEDURE — 99214 OFFICE O/P EST MOD 30 MIN: CPT | Performed by: OBSTETRICS & GYNECOLOGY

## 2021-01-07 NOTE — LETTER
January 7, 2021     Giana Noguera, [facility]  [address] [address]    Patient: Tramaine Zapata   YOB: 1980   Date of Visit: 1/7/2021       Dear Adriana Frank,    Thank you for referring Mare Irwin to me for evaluation  Below are my notes for this consultation  If you have questions, please do not hesitate to call me  I look forward to following your patient along with you  Sincerely,        Magdy Ramirez DO      [employer]        CC: No Recipients  Magdy Ramirez DO  1/7/2021  2:00 PM  Incomplete  Assessment/Plan:    Discussed endometriosis and treatment options including alternative medical management versus surgical management  Patient is opted to proceed with surgical management  The plan is to proceed with an [address] possible BSO  She is aware that the should BSO become necessary this will place her into surgical menopause  We discussed the risks of the surgery including but not limited to infection, hemorrhage, bowel bladder or vascular or ureteral injury, possible necessity for laparotomy, possible persistence of pain     Diagnoses and all orders for this visit:    Endometriosis    Chronic pelvic pain in female    Interstitial cystitis        Subjective:      Patient ID: Tramaine Zapata is a 47 y o  female  HPI  G 9 P 2073 referred to office by Dr Junior Hart for hysterectomy secondary to endometriosis  All patient has a long history of pelvic pain and endometriosis  She has had multiple laparoscopies in the past for fulguration of endometriosis and cystectomies  Patient has nearly persistent pain  It is typically a scale of 5 to 6/10  Occasionally a becomes worse  She presently has a Mirena IUD in place for controlling of menorrhagia  Patient has had a prior tubal   Ultrasound in October 2019 revealed a 4 3 cm complex cyst on the right ovary  A CT scan in December revealed 5 2 cm complex cyst on the left ovary    Patient has had 2 prior  sections with a tubal at the time of the 2nd  section  Pain that she is experiencing is nonresponsive to nonsteroidals  She has no associated nausea vomiting diarrhea or constipation  She does have a history of interstitial cystitis which in the past was treated by Dr Saira Barker with bladder instillations  The following portions of the patient's history were reviewed and updated as appropriate:   She  has a past medical history of Anxiety and depression, Chronic kidney disease, Diabetes mellitus ([address] ), Disease of thyroid gland, Endometriosis, Female infertility, Generalized anxiety disorder, Renal calculi, and Varicella  She   Patient Active Problem List    Diagnosis Date Noted    Gastroduodenitis 2019    Elevated alkaline phosphatase level 11/15/2018    Endometriosis determined by laparoscopy 2018    Anxiety and depression 2018    Vitamin D deficiency 2014    Allergic rhinitis 2013     She  has a past surgical history that includes  section; Appendectomy; Nasal septum surgery; pr  delivery only (N/A, 2017); pr ligation,fallopian tube w/ (Bilateral, 2017); Diagnostic laparoscopy; Laser laparoscopy (); and Laparoscopic ovarian cystectomy  Her family history includes Alcohol abuse in her maternal uncle; Anxiety disorder in her mother; Arthritis in her paternal grandmother; Cancer in her maternal grandmother and sister; Colon cancer in her paternal grandmother; Dementia in her maternal grandmother; Depression in her maternal grandmother and mother; Endometriosis in her mother and sister; Hearing loss in her paternal grandmother; Heart defect in her mother; Heart disease in her paternal grandmother; Hyperlipidemia in her father; Hypertension in her mother; Kylah Tio / [employer]ela High in her mother and paternal grandmother; Stroke in her paternal grandmother; Vision loss in her maternal grandmother    She  reports that she has quit smoking  She has never used smokeless tobacco  She reports that she does not drink alcohol or use drugs  Current Outpatient Medications   Medication Sig Dispense Refill    cetirizine (ZyrTEC) 10 mg tablet Take 10 mg by mouth daily at bedtime       Cholecalciferol (VITAMIN D) 2000 units CAPS Take by mouth daily      escitalopram (LEXAPRO) 10 mg tablet TAKE 1 TABLET BY MOUTH EVERY DAY 30 tablet 5    famotidine (PEPCID) 20 mg tablet Take 1 tablet (20 mg total) by mouth 2 (two) times a day 30 tablet 0    fluticasone (FLONASE) 50 mcg/act nasal spray 2 sprays into each nostril daily 1 Bottle 0    levonorgestrel (MIRENA) 20 MCG/24HR IUD 1 each by Intrauterine route once      methocarbamol (ROBAXIN) 750 mg tablet Take 1 tablet (750 mg total) by mouth 3 (three) times a day as needed for muscle spasms (tension headache) 30 tablet 0    ondansetron (ZOFRAN-ODT) 4 mg disintegrating tablet Take 1 tablet (4 mg total) by mouth every 6 (six) hours as needed for nausea or vomiting 20 tablet 0    pantoprazole (PROTONIX) 40 mg tablet Take 1 tablet (40 mg total) by mouth daily 30 tablet 0    pseudoephedrine (SUDAFED) 120 MG 12 hr tablet Take 120 mg by mouth 2 (two) times a day PRN      varenicline (CHANTIX CARL) 0 5 MG X 11 & 1 MG X 42 tablet Take one 0 5 mg tablet by mouth once daily for 3 days, then one 0 5 mg tablet by mouth twice daily for 4 days, then one 1 mg tablet by mouth twice daily  53 tablet 0    varenicline (CHANTIX) 1 mg tablet Take 1 tablet (1 mg total) by mouth 2 (two) times a day 60 tablet 4     No current facility-administered medications for this visit        Current Outpatient Medications on File Prior to Visit   Medication Sig    cetirizine (ZyrTEC) 10 mg tablet Take 10 mg by mouth daily at bedtime     Cholecalciferol (VITAMIN D) 2000 units CAPS Take by mouth daily    escitalopram (LEXAPRO) 10 mg tablet TAKE 1 TABLET BY MOUTH EVERY DAY    famotidine (PEPCID) 20 mg tablet Take 1 tablet (20 mg total) by mouth 2 (two) times a day    fluticasone (FLONASE) 50 mcg/act nasal spray 2 sprays into each nostril daily    levonorgestrel (MIRENA) 20 MCG/24HR IUD 1 each by Intrauterine route once    methocarbamol (ROBAXIN) 750 mg tablet Take 1 tablet (750 mg total) by mouth 3 (three) times a day as needed for muscle spasms (tension headache)    ondansetron (ZOFRAN-ODT) 4 mg disintegrating tablet Take 1 tablet (4 mg total) by mouth every 6 (six) hours as needed for nausea or vomiting    pantoprazole (PROTONIX) 40 mg tablet Take 1 tablet (40 mg total) by mouth daily    pseudoephedrine (SUDAFED) 120 MG 12 hr tablet Take 120 mg by mouth 2 (two) times a day PRN    varenicline (CHANTIX CARL) 0 5 MG X 11 & 1 MG X 42 tablet Take one 0 5 mg tablet by mouth once daily for 3 days, then one 0 5 mg tablet by mouth twice daily for 4 days, then one 1 mg tablet by mouth twice daily   varenicline (CHANTIX) 1 mg tablet Take 1 tablet (1 mg total) by mouth 2 (two) times a day     No current facility-administered medications on file prior to visit  She is allergic to cefprozil; pertussis vaccine; shellfish-derived products; and amoxicillin       Review of Systems   Constitutional: Negative  HENT: Negative for sore throat and trouble swallowing  Gastrointestinal: Positive for abdominal pain  Negative for blood in stool, constipation, diarrhea, nausea and vomiting  Genitourinary: Positive for dyspareunia and pelvic pain  Negative for difficulty urinating, dysuria, enuresis, frequency, hematuria, menstrual problem, urgency, vaginal bleeding and vaginal discharge  Objective:      /78 (BP Location: Right arm, Patient Position: Sitting, Cuff Size: Standard)   Pulse 97   Ht 5' 5" (1 651 m)   Wt 75 3 kg (166 lb)   BMI 27 62 kg/m²          Physical Exam  Vitals signs reviewed  Constitutional:       Appearance: Normal appearance  Neck:      Musculoskeletal: Normal range of motion and neck supple   No muscular tenderness  Cardiovascular:      Rate and Rhythm: Normal rate and regular rhythm  Pulses: Normal pulses  Heart sounds: Normal heart sounds  No murmur  Pulmonary:      Effort: Pulmonary effort is normal  No respiratory distress  Breath sounds: Normal breath sounds  Abdominal:      General: Bowel sounds are normal  There is no distension  Palpations: Abdomen is soft  There is no mass  Tenderness: There is no abdominal tenderness  There is no guarding  Hernia: No hernia is present  There is no hernia in the left inguinal area or right inguinal area  Genitourinary:     General: Normal vulva  Labia:         Right: No rash, tenderness or lesion  Left: No rash, tenderness or lesion  Vagina: Normal       Cervix: Normal       Uterus: Normal        Adnexa:         Right: Tenderness present  No mass or fullness  Left: Tenderness present  No mass or fullness  Lymphadenopathy:      Cervical: No cervical adenopathy  Lower Body: No right inguinal adenopathy  No left inguinal adenopathy  Neurological:      Mental Status: She is alert

## 2021-01-07 NOTE — PROGRESS NOTES
Assessment/Plan:    Discussed endometriosis and treatment options including alternative medical management versus surgical management  Patient is opted to proceed with surgical management  The plan is to proceed with an [address] possible BSO  She is aware that the should BSO become necessary this will place her into surgical menopause  We discussed the risks of the surgery including but not limited to infection, hemorrhage, bowel bladder or vascular or ureteral injury, possible necessity for laparotomy, possible persistence of pain     Diagnoses and all orders for this visit:    Endometriosis    Chronic pelvic pain in female    Interstitial cystitis        Subjective:      Patient ID: Lavon Byrd is a 47 y o  female  HPI  G 9 P  referred to office by Dr Nathan Quiles for hysterectomy secondary to endometriosis  All patient has a long history of pelvic pain and endometriosis  She has had multiple laparoscopies in the past for fulguration of endometriosis and cystectomies  Patient has nearly persistent pain  It is typically a scale of 5 to 6/10  Occasionally a becomes worse  She presently has a Mirena IUD in place for controlling of menorrhagia  Patient has had a prior tubal   Ultrasound in 2019 revealed a 4 3 cm complex cyst on the right ovary  A CT scan in December revealed 5 2 cm complex cyst on the left ovary  Patient has had 2 prior  sections with a tubal at the time of the 2nd  section  Pain that she is experiencing is nonresponsive to nonsteroidals  She has no associated nausea vomiting diarrhea or constipation  She does have a history of interstitial cystitis which in the past was treated by Dr Peg Johnston with bladder instillations      The following portions of the patient's history were reviewed and updated as appropriate:   She  has a past medical history of Anxiety and depression, Chronic kidney disease, Diabetes mellitus ([address] ), Disease of thyroid gland, Endometriosis, Female infertility, Generalized anxiety disorder, Renal calculi, and Varicella  She   Patient Active Problem List    Diagnosis Date Noted    Gastroduodenitis 2019    Elevated alkaline phosphatase level 11/15/2018    Endometriosis determined by laparoscopy 2018    Anxiety and depression 2018    Vitamin D deficiency 2014    Allergic rhinitis 2013     She  has a past surgical history that includes  section; Appendectomy; Nasal septum surgery; pr  delivery only (N/A, 2017); pr ligation,fallopian tube w/ (Bilateral, 2017); Diagnostic laparoscopy; Laser laparoscopy (); and Laparoscopic ovarian cystectomy  Her family history includes Alcohol abuse in her maternal uncle; Anxiety disorder in her mother; Arthritis in her paternal grandmother; Cancer in her maternal grandmother and sister; Colon cancer in her paternal grandmother; Dementia in her maternal grandmother; Depression in her maternal grandmother and mother; Endometriosis in her mother and sister; Hearing loss in her paternal grandmother; Heart defect in her mother; Heart disease in her paternal grandmother; Hyperlipidemia in her father; Hypertension in her mother; Erla Kiersten / Naism Lex in her mother and paternal grandmother; Stroke in her paternal grandmother; Vision loss in her maternal grandmother  She  reports that she has quit smoking  She has never used smokeless tobacco  She reports that she does not drink alcohol or use drugs    Current Outpatient Medications   Medication Sig Dispense Refill    cetirizine (ZyrTEC) 10 mg tablet Take 10 mg by mouth daily at bedtime       Cholecalciferol (VITAMIN D) 2000 units CAPS Take by mouth daily      escitalopram (LEXAPRO) 10 mg tablet TAKE 1 TABLET BY MOUTH EVERY DAY 30 tablet 5    famotidine (PEPCID) 20 mg tablet Take 1 tablet (20 mg total) by mouth 2 (two) times a day 30 tablet 0    fluticasone (FLONASE) 50 mcg/act nasal spray 2 sprays into each nostril daily 1 Bottle 0    levonorgestrel (MIRENA) 20 MCG/24HR IUD 1 each by Intrauterine route once      methocarbamol (ROBAXIN) 750 mg tablet Take 1 tablet (750 mg total) by mouth 3 (three) times a day as needed for muscle spasms (tension headache) 30 tablet 0    ondansetron (ZOFRAN-ODT) 4 mg disintegrating tablet Take 1 tablet (4 mg total) by mouth every 6 (six) hours as needed for nausea or vomiting 20 tablet 0    pantoprazole (PROTONIX) 40 mg tablet Take 1 tablet (40 mg total) by mouth daily 30 tablet 0    pseudoephedrine (SUDAFED) 120 MG 12 hr tablet Take 120 mg by mouth 2 (two) times a day PRN      varenicline (CHANTIX CARL) 0 5 MG X 11 & 1 MG X 42 tablet Take one 0 5 mg tablet by mouth once daily for 3 days, then one 0 5 mg tablet by mouth twice daily for 4 days, then one 1 mg tablet by mouth twice daily  53 tablet 0    varenicline (CHANTIX) 1 mg tablet Take 1 tablet (1 mg total) by mouth 2 (two) times a day 60 tablet 4     No current facility-administered medications for this visit        Current Outpatient Medications on File Prior to Visit   Medication Sig    cetirizine (ZyrTEC) 10 mg tablet Take 10 mg by mouth daily at bedtime     Cholecalciferol (VITAMIN D) 2000 units CAPS Take by mouth daily    escitalopram (LEXAPRO) 10 mg tablet TAKE 1 TABLET BY MOUTH EVERY DAY    famotidine (PEPCID) 20 mg tablet Take 1 tablet (20 mg total) by mouth 2 (two) times a day    fluticasone (FLONASE) 50 mcg/act nasal spray 2 sprays into each nostril daily    levonorgestrel (MIRENA) 20 MCG/24HR IUD 1 each by Intrauterine route once    methocarbamol (ROBAXIN) 750 mg tablet Take 1 tablet (750 mg total) by mouth 3 (three) times a day as needed for muscle spasms (tension headache)    ondansetron (ZOFRAN-ODT) 4 mg disintegrating tablet Take 1 tablet (4 mg total) by mouth every 6 (six) hours as needed for nausea or vomiting    pantoprazole (PROTONIX) 40 mg tablet Take 1 tablet (40 mg total) by mouth daily    pseudoephedrine (SUDAFED) 120 MG 12 hr tablet Take 120 mg by mouth 2 (two) times a day PRN    varenicline (CHANTIX CARL) 0 5 MG X 11 & 1 MG X 42 tablet Take one 0 5 mg tablet by mouth once daily for 3 days, then one 0 5 mg tablet by mouth twice daily for 4 days, then one 1 mg tablet by mouth twice daily   varenicline (CHANTIX) 1 mg tablet Take 1 tablet (1 mg total) by mouth 2 (two) times a day     No current facility-administered medications on file prior to visit  She is allergic to cefprozil; pertussis vaccine; shellfish-derived products; and amoxicillin       Review of Systems   Constitutional: Negative  HENT: Negative for sore throat and trouble swallowing  Gastrointestinal: Positive for abdominal pain  Negative for blood in stool, constipation, diarrhea, nausea and vomiting  Genitourinary: Positive for dyspareunia and pelvic pain  Negative for difficulty urinating, dysuria, enuresis, frequency, hematuria, menstrual problem, urgency, vaginal bleeding and vaginal discharge  Objective:      /78 (BP Location: Right arm, Patient Position: Sitting, Cuff Size: Standard)   Pulse 97   Ht 5' 5" (1 651 m)   Wt 75 3 kg (166 lb)   BMI 27 62 kg/m²          Physical Exam  Vitals signs reviewed  Constitutional:       Appearance: Normal appearance  Neck:      Musculoskeletal: Normal range of motion and neck supple  No muscular tenderness  Cardiovascular:      Rate and Rhythm: Normal rate and regular rhythm  Pulses: Normal pulses  Heart sounds: Normal heart sounds  No murmur  Pulmonary:      Effort: Pulmonary effort is normal  No respiratory distress  Breath sounds: Normal breath sounds  Abdominal:      General: Bowel sounds are normal  There is no distension  Palpations: Abdomen is soft  There is no mass  Tenderness: There is no abdominal tenderness  There is no guarding  Hernia: No hernia is present   There is no hernia in the left inguinal area or right inguinal area  Genitourinary:     General: Normal vulva  Labia:         Right: No rash, tenderness or lesion  Left: No rash, tenderness or lesion  Vagina: Normal       Cervix: Normal       Uterus: Normal        Adnexa:         Right: Tenderness present  No mass or fullness  Left: Tenderness present  No mass or fullness  Lymphadenopathy:      Cervical: No cervical adenopathy  Lower Body: No right inguinal adenopathy  No left inguinal adenopathy  Neurological:      Mental Status: She is alert

## 2021-01-13 DIAGNOSIS — G44.209 TENSION HEADACHE: ICD-10-CM

## 2021-01-13 RX ORDER — PANTOPRAZOLE SODIUM 40 MG/1
TABLET, DELAYED RELEASE ORAL
Qty: 30 TABLET | Refills: 3 | Status: SHIPPED | OUTPATIENT
Start: 2021-01-13 | End: 2021-11-15 | Stop reason: ALTCHOICE

## 2021-02-16 DIAGNOSIS — N80.9 ENDOMETRIOSIS: Primary | ICD-10-CM

## 2021-02-16 DIAGNOSIS — G89.29 CHRONIC PELVIC PAIN IN FEMALE: ICD-10-CM

## 2021-02-16 DIAGNOSIS — Z01.812 PRE-OPERATIVE LABORATORY EXAMINATION: ICD-10-CM

## 2021-02-16 DIAGNOSIS — R10.2 CHRONIC PELVIC PAIN IN FEMALE: ICD-10-CM

## 2021-02-22 ENCOUNTER — TELEPHONE (OUTPATIENT)
Dept: GYNECOLOGY | Facility: CLINIC | Age: 41
End: 2021-02-22

## 2021-02-22 ENCOUNTER — DOCUMENTATION (OUTPATIENT)
Dept: GYNECOLOGY | Facility: CLINIC | Age: 41
End: 2021-02-22

## 2021-02-22 NOTE — TELEPHONE ENCOUNTER
Pt  States She needs [facility] paper work filled out with updated dates since surgery was rescheduled

## 2021-02-22 NOTE — PRE-PROCEDURE INSTRUCTIONS
Pre-Surgery Instructions:   Medication Instructions    cetirizine (ZyrTEC) 10 mg tablet Instructed patient per Anesthesia Guidelines   escitalopram (LEXAPRO) 10 mg tablet Instructed patient per Anesthesia Guidelines   levonorgestrel (MIRENA) 20 MCG/24HR IUD Instructed patient per Anesthesia Guidelines   pantoprazole (PROTONIX) 40 mg tablet Instructed patient per Anesthesia Guidelines   varenicline (CHANTIX CARL) 0 5 MG X 11 & 1 MG X 42 tablet Instructed patient per Anesthesia Guidelines   varenicline (CHANTIX) 1 mg tablet Instructed patient per Anesthesia Guidelines  Education Index    Med Instructions Troubleshoot   5HT3 Antagonists Med Class  *pt verbalizes understanding    Continue to take this medication on your normal schedule  If this is an oral medication and you take it in the morning, then you may take this medicine with a sip of water  Alpha-1 adrenergic agonist Med Class  *pt verbalizes understanding    Continue to take this medication on your normal schedule  If this is an oral medication and you take it in the morning, then you may take this medicine with a sip of water  Antidepressant Med Class  *pt verbalizes understanding    Continue to take this medication on your normal schedule  If this is an oral medication and you take it in the morning, then you may take this medicine with a sip of water  H2 Blocker Med Class  *pt verbalizes understanding    Continue to take this medication on your normal schedule  If this is an oral medication and you take it in the morning, then you may take this medicine with a sip of water  Vitamin Med Class  *Last Dose a 1 week ago    You may continue to take any vitamin that your surgeon has prescribed to you up to the day before surgery  If your surgeon has not specifically prescribed this vitamin or instructed you to continue then stop taking 7 days prior to surgery

## 2021-02-23 ENCOUNTER — LAB (OUTPATIENT)
Dept: LAB | Facility: CLINIC | Age: 41
End: 2021-02-23
Payer: COMMERCIAL

## 2021-02-23 DIAGNOSIS — G89.29 CHRONIC PELVIC PAIN IN FEMALE: ICD-10-CM

## 2021-02-23 DIAGNOSIS — N80.9 ENDOMETRIOSIS: ICD-10-CM

## 2021-02-23 DIAGNOSIS — R10.2 CHRONIC PELVIC PAIN IN FEMALE: ICD-10-CM

## 2021-02-23 DIAGNOSIS — Z01.812 PRE-OPERATIVE LABORATORY EXAMINATION: ICD-10-CM

## 2021-02-23 LAB
ABO GROUP BLD: NORMAL
BLD GP AB SCN SERPL QL: NEGATIVE
EST. AVERAGE GLUCOSE BLD GHB EST-MCNC: 103 MG/DL
HBA1C MFR BLD: 5.2 %
HCT VFR BLD AUTO: 41.2 % (ref 34.8–46.1)
HGB BLD-MCNC: 13.1 G/DL (ref 11.5–15.4)
RH BLD: POSITIVE
SPECIMEN EXPIRATION DATE: NORMAL

## 2021-02-23 PROCEDURE — 86901 BLOOD TYPING SEROLOGIC RH(D): CPT

## 2021-02-23 PROCEDURE — NC001 PR NO CHARGE: Performed by: OBSTETRICS & GYNECOLOGY

## 2021-02-23 PROCEDURE — 86850 RBC ANTIBODY SCREEN: CPT

## 2021-02-23 PROCEDURE — 36415 COLL VENOUS BLD VENIPUNCTURE: CPT

## 2021-02-23 PROCEDURE — 85018 HEMOGLOBIN: CPT

## 2021-02-23 PROCEDURE — 85014 HEMATOCRIT: CPT

## 2021-02-23 PROCEDURE — 86900 BLOOD TYPING SEROLOGIC ABO: CPT

## 2021-02-23 PROCEDURE — 83036 HEMOGLOBIN GLYCOSYLATED A1C: CPT

## 2021-02-23 NOTE — H&P
Assessment/Plan:     Discussed endometriosis and treatment options including alternative medical management versus surgical management  Patient is opted to proceed with surgical management  The plan is to proceed with an [address] possible BSO  She is aware that the should BSO become necessary this will place her into surgical menopause  We discussed the risks of the surgery including but not limited to infection, hemorrhage, bowel bladder or vascular or ureteral injury, possible necessity for laparotomy, possible persistence of pain      Diagnoses and all orders for this visit:     Endometriosis     Chronic pelvic pain in female     Interstitial cystitis         Subjective:       Patient ID: Bhupendra Kaur is a 47 y o  female      HPI  G 9 P  referred to office by Dr Teagn Cyr for hysterectomy secondary to endometriosis  All patient has a long history of pelvic pain and endometriosis  She has had multiple laparoscopies in the past for fulguration of endometriosis and cystectomies  Patient has nearly persistent pain  It is typically a scale of 5 to 6/10  Occasionally a becomes worse  She presently has a Mirena IUD in place for controlling of menorrhagia  Patient has had a prior tubal   Ultrasound in 2019 revealed a 4 3 cm complex cyst on the right ovary  A CT scan in December revealed 5 2 cm complex cyst on the left ovary  Patient has had 2 prior  sections with a tubal at the time of the 2nd  section  Pain that she is experiencing is nonresponsive to nonsteroidals  She has no associated nausea vomiting diarrhea or constipation    She does have a history of interstitial cystitis which in the past was treated by Dr En Cristobal with bladder instillations      The following portions of the patient's history were reviewed and updated as appropriate:   She  has a past medical history of Anxiety and depression, Chronic kidney disease, Diabetes mellitus ([address] ), Disease of thyroid gland, Endometriosis, Female infertility, Generalized anxiety disorder, Renal calculi, and Varicella  She        Patient Active Problem List     Diagnosis Date Noted    Gastroduodenitis 2019    Elevated alkaline phosphatase level 11/15/2018    Endometriosis determined by laparoscopy 2018    Anxiety and depression 2018    Vitamin D deficiency 2014    Allergic rhinitis 2013      She  has a past surgical history that includes  section; Appendectomy; Nasal septum surgery; pr  delivery only (N/A, 2017); pr ligation,fallopian tube w/ (Bilateral, 2017); Diagnostic laparoscopy; Laser laparoscopy (); and Laparoscopic ovarian cystectomy  Her family history includes Alcohol abuse in her maternal uncle; Anxiety disorder in her mother; Arthritis in her paternal grandmother; Cancer in her maternal grandmother and sister; Colon cancer in her paternal grandmother; Dementia in her maternal grandmother; Depression in her maternal grandmother and mother; Endometriosis in her mother and sister; Hearing loss in her paternal grandmother; Heart defect in her mother; Heart disease in her paternal grandmother; Hyperlipidemia in her father; Hypertension in her mother; Morales Kluver / Lanie Hockey in her mother and paternal grandmother; Stroke in her paternal grandmother; Vision loss in her maternal grandmother  She  reports that she has quit smoking  She has never used smokeless tobacco  She reports that she does not drink alcohol or use drugs           Current Outpatient Medications   Medication Sig Dispense Refill    cetirizine (ZyrTEC) 10 mg tablet Take 10 mg by mouth daily at bedtime         Cholecalciferol (VITAMIN D) 2000 units CAPS Take by mouth daily        escitalopram (LEXAPRO) 10 mg tablet TAKE 1 TABLET BY MOUTH EVERY DAY 30 tablet 5    famotidine (PEPCID) 20 mg tablet Take 1 tablet (20 mg total) by mouth 2 (two) times a day 30 tablet 0    fluticasone (FLONASE) 50 mcg/act nasal spray 2 sprays into each nostril daily 1 Bottle 0    levonorgestrel (MIRENA) 20 MCG/24HR IUD 1 each by Intrauterine route once        methocarbamol (ROBAXIN) 750 mg tablet Take 1 tablet (750 mg total) by mouth 3 (three) times a day as needed for muscle spasms (tension headache) 30 tablet 0    ondansetron (ZOFRAN-ODT) 4 mg disintegrating tablet Take 1 tablet (4 mg total) by mouth every 6 (six) hours as needed for nausea or vomiting 20 tablet 0    pantoprazole (PROTONIX) 40 mg tablet Take 1 tablet (40 mg total) by mouth daily 30 tablet 0    pseudoephedrine (SUDAFED) 120 MG 12 hr tablet Take 120 mg by mouth 2 (two) times a day PRN        varenicline (CHANTIX CARL) 0 5 MG X 11 & 1 MG X 42 tablet Take one 0 5 mg tablet by mouth once daily for 3 days, then one 0 5 mg tablet by mouth twice daily for 4 days, then one 1 mg tablet by mouth twice daily   53 tablet 0    varenicline (CHANTIX) 1 mg tablet Take 1 tablet (1 mg total) by mouth 2 (two) times a day 60 tablet 4      No current facility-administered medications for this visit             Current Outpatient Medications on File Prior to Visit   Medication Sig    cetirizine (ZyrTEC) 10 mg tablet Take 10 mg by mouth daily at bedtime     Cholecalciferol (VITAMIN D) 2000 units CAPS Take by mouth daily    escitalopram (LEXAPRO) 10 mg tablet TAKE 1 TABLET BY MOUTH EVERY DAY    famotidine (PEPCID) 20 mg tablet Take 1 tablet (20 mg total) by mouth 2 (two) times a day    fluticasone (FLONASE) 50 mcg/act nasal spray 2 sprays into each nostril daily    levonorgestrel (MIRENA) 20 MCG/24HR IUD 1 each by Intrauterine route once    methocarbamol (ROBAXIN) 750 mg tablet Take 1 tablet (750 mg total) by mouth 3 (three) times a day as needed for muscle spasms (tension headache)    ondansetron (ZOFRAN-ODT) 4 mg disintegrating tablet Take 1 tablet (4 mg total) by mouth every 6 (six) hours as needed for nausea or vomiting    pantoprazole (PROTONIX) 40 mg tablet Take 1 tablet (40 mg total) by mouth daily    pseudoephedrine (SUDAFED) 120 MG 12 hr tablet Take 120 mg by mouth 2 (two) times a day PRN    varenicline (CHANTIX CARL) 0 5 MG X 11 & 1 MG X 42 tablet Take one 0 5 mg tablet by mouth once daily for 3 days, then one 0 5 mg tablet by mouth twice daily for 4 days, then one 1 mg tablet by mouth twice daily   varenicline (CHANTIX) 1 mg tablet Take 1 tablet (1 mg total) by mouth 2 (two) times a day      No current facility-administered medications on file prior to visit        She is allergic to cefprozil; pertussis vaccine; shellfish-derived products; and amoxicillin        Review of Systems   Constitutional: Negative  HENT: Negative for sore throat and trouble swallowing  Gastrointestinal: Positive for abdominal pain  Negative for blood in stool, constipation, diarrhea, nausea and vomiting  Genitourinary: Positive for dyspareunia and pelvic pain  Negative for difficulty urinating, dysuria, enuresis, frequency, hematuria, menstrual problem, urgency, vaginal bleeding and vaginal discharge           Objective:        /78 (BP Location: Right arm, Patient Position: Sitting, Cuff Size: Standard)   Pulse 97   Ht 5' 5" (1 651 m)   Wt 75 3 kg (166 lb)   BMI 27 62 kg/m²             Physical Exam  Vitals signs reviewed  Constitutional:       Appearance: Normal appearance  Neck:      Musculoskeletal: Normal range of motion and neck supple  No muscular tenderness  Cardiovascular:      Rate and Rhythm: Normal rate and regular rhythm  Pulses: Normal pulses  Heart sounds: Normal heart sounds  No murmur  Pulmonary:      Effort: Pulmonary effort is normal  No respiratory distress  Breath sounds: Normal breath sounds  Abdominal:      General: Bowel sounds are normal  There is no distension  Palpations: Abdomen is soft  There is no mass  Tenderness: There is no abdominal tenderness  There is no guarding        Hernia: No hernia is present  There is no hernia in the left inguinal area or right inguinal area  Genitourinary:     General: Normal vulva  Labia:         Right: No rash, tenderness or lesion  Left: No rash, tenderness or lesion  Vagina: Normal       Cervix: Normal       Uterus: Normal        Adnexa:         Right: Tenderness present  No mass or fullness  Left: Tenderness present  No mass or fullness  Lymphadenopathy:      Cervical: No cervical adenopathy  Lower Body: No right inguinal adenopathy  No left inguinal adenopathy  Neurological:      Mental Status: She is alert

## 2021-02-25 ENCOUNTER — ANESTHESIA EVENT (OUTPATIENT)
Dept: PERIOP | Facility: HOSPITAL | Age: 41
End: 2021-02-25
Payer: COMMERCIAL

## 2021-03-01 ENCOUNTER — ANESTHESIA (OUTPATIENT)
Dept: PERIOP | Facility: HOSPITAL | Age: 41
End: 2021-03-01
Payer: COMMERCIAL

## 2021-03-01 ENCOUNTER — HOSPITAL ENCOUNTER (OUTPATIENT)
Facility: HOSPITAL | Age: 41
Setting detail: OUTPATIENT SURGERY
Discharge: HOME | End: 2021-03-01
Attending: OBSTETRICS & GYNECOLOGY | Admitting: OBSTETRICS & GYNECOLOGY
Payer: COMMERCIAL

## 2021-03-01 VITALS
WEIGHT: 166 LBS | RESPIRATION RATE: 18 BRPM | DIASTOLIC BLOOD PRESSURE: 62 MMHG | HEIGHT: 65 IN | TEMPERATURE: 97.7 F | HEART RATE: 99 BPM | OXYGEN SATURATION: 95 % | SYSTOLIC BLOOD PRESSURE: 138 MMHG | BODY MASS INDEX: 27.66 KG/M2

## 2021-03-01 DIAGNOSIS — G89.18 POSTOPERATIVE PAIN: Primary | ICD-10-CM

## 2021-03-01 DIAGNOSIS — N80.9 ENDOMETRIOSIS: ICD-10-CM

## 2021-03-01 DIAGNOSIS — R10.2 PELVIC PAIN IN FEMALE: ICD-10-CM

## 2021-03-01 PROBLEM — Z98.890 PONV (POSTOPERATIVE NAUSEA AND VOMITING): Status: ACTIVE | Noted: 2021-03-01

## 2021-03-01 PROBLEM — R11.2 PONV (POSTOPERATIVE NAUSEA AND VOMITING): Status: ACTIVE | Noted: 2021-03-01

## 2021-03-01 LAB
EXT PREGNANCY TEST URINE: NEGATIVE
EXT. CONTROL: NORMAL

## 2021-03-01 PROCEDURE — 58542 LSH W/T/O UT 250 G OR LESS: CPT | Performed by: OBSTETRICS & GYNECOLOGY

## 2021-03-01 PROCEDURE — 88307 TISSUE EXAM BY PATHOLOGIST: CPT | Performed by: PATHOLOGY

## 2021-03-01 PROCEDURE — 81025 URINE PREGNANCY TEST: CPT | Performed by: OBSTETRICS & GYNECOLOGY

## 2021-03-01 PROCEDURE — C1782 MORCELLATOR: HCPCS | Performed by: OBSTETRICS & GYNECOLOGY

## 2021-03-01 RX ORDER — HYDROMORPHONE HCL/PF 1 MG/ML
0.5 SYRINGE (ML) INJECTION
Status: DISCONTINUED | OUTPATIENT
Start: 2021-03-01 | End: 2021-03-01 | Stop reason: HOSPADM

## 2021-03-01 RX ORDER — DEXAMETHASONE SODIUM PHOSPHATE 10 MG/ML
INJECTION, SOLUTION INTRAMUSCULAR; INTRAVENOUS AS NEEDED
Status: DISCONTINUED | OUTPATIENT
Start: 2021-03-01 | End: 2021-03-01

## 2021-03-01 RX ORDER — IBUPROFEN 600 MG/1
600 TABLET ORAL EVERY 6 HOURS PRN
Status: DISCONTINUED | OUTPATIENT
Start: 2021-03-01 | End: 2021-03-01 | Stop reason: HOSPADM

## 2021-03-01 RX ORDER — SODIUM CHLORIDE 9 MG/ML
125 INJECTION, SOLUTION INTRAVENOUS CONTINUOUS
Status: DISCONTINUED | OUTPATIENT
Start: 2021-03-01 | End: 2021-03-01 | Stop reason: HOSPADM

## 2021-03-01 RX ORDER — NEOSTIGMINE METHYLSULFATE 1 MG/ML
INJECTION INTRAVENOUS AS NEEDED
Status: DISCONTINUED | OUTPATIENT
Start: 2021-03-01 | End: 2021-03-01

## 2021-03-01 RX ORDER — SCOLOPAMINE TRANSDERMAL SYSTEM 1 MG/1
1 PATCH, EXTENDED RELEASE TRANSDERMAL ONCE AS NEEDED
Status: DISCONTINUED | OUTPATIENT
Start: 2021-03-01 | End: 2021-03-01 | Stop reason: HOSPADM

## 2021-03-01 RX ORDER — OXYCODONE HYDROCHLORIDE AND ACETAMINOPHEN 5; 325 MG/1; MG/1
1 TABLET ORAL EVERY 4 HOURS PRN
Qty: 15 TABLET | Refills: 0 | Status: SHIPPED | OUTPATIENT
Start: 2021-03-01 | End: 2021-11-15 | Stop reason: ALTCHOICE

## 2021-03-01 RX ORDER — FENTANYL CITRATE/PF 50 MCG/ML
25 SYRINGE (ML) INJECTION
Status: DISCONTINUED | OUTPATIENT
Start: 2021-03-01 | End: 2021-03-01 | Stop reason: HOSPADM

## 2021-03-01 RX ORDER — SODIUM CHLORIDE 9 MG/ML
INJECTION, SOLUTION INTRAVENOUS CONTINUOUS PRN
Status: DISCONTINUED | OUTPATIENT
Start: 2021-03-01 | End: 2021-03-01

## 2021-03-01 RX ORDER — ACETAMINOPHEN 325 MG/1
650 TABLET ORAL EVERY 6 HOURS PRN
Status: DISCONTINUED | OUTPATIENT
Start: 2021-03-01 | End: 2021-03-01 | Stop reason: HOSPADM

## 2021-03-01 RX ORDER — MIDAZOLAM HYDROCHLORIDE 2 MG/2ML
INJECTION, SOLUTION INTRAMUSCULAR; INTRAVENOUS AS NEEDED
Status: DISCONTINUED | OUTPATIENT
Start: 2021-03-01 | End: 2021-03-01

## 2021-03-01 RX ORDER — HYDROMORPHONE HCL/PF 1 MG/ML
SYRINGE (ML) INJECTION AS NEEDED
Status: DISCONTINUED | OUTPATIENT
Start: 2021-03-01 | End: 2021-03-01

## 2021-03-01 RX ORDER — FENTANYL CITRATE 50 UG/ML
INJECTION, SOLUTION INTRAMUSCULAR; INTRAVENOUS AS NEEDED
Status: DISCONTINUED | OUTPATIENT
Start: 2021-03-01 | End: 2021-03-01

## 2021-03-01 RX ORDER — ONDANSETRON 2 MG/ML
INJECTION INTRAMUSCULAR; INTRAVENOUS AS NEEDED
Status: DISCONTINUED | OUTPATIENT
Start: 2021-03-01 | End: 2021-03-01

## 2021-03-01 RX ORDER — PROPOFOL 10 MG/ML
INJECTION, EMULSION INTRAVENOUS CONTINUOUS PRN
Status: DISCONTINUED | OUTPATIENT
Start: 2021-03-01 | End: 2021-03-01

## 2021-03-01 RX ORDER — ONDANSETRON 2 MG/ML
4 INJECTION INTRAMUSCULAR; INTRAVENOUS EVERY 6 HOURS PRN
Status: DISCONTINUED | OUTPATIENT
Start: 2021-03-01 | End: 2021-03-01 | Stop reason: HOSPADM

## 2021-03-01 RX ORDER — PROPOFOL 10 MG/ML
INJECTION, EMULSION INTRAVENOUS AS NEEDED
Status: DISCONTINUED | OUTPATIENT
Start: 2021-03-01 | End: 2021-03-01

## 2021-03-01 RX ORDER — LIDOCAINE HYDROCHLORIDE 10 MG/ML
INJECTION, SOLUTION EPIDURAL; INFILTRATION; INTRACAUDAL; PERINEURAL AS NEEDED
Status: DISCONTINUED | OUTPATIENT
Start: 2021-03-01 | End: 2021-03-01

## 2021-03-01 RX ORDER — OXYCODONE HYDROCHLORIDE 5 MG/1
5 TABLET ORAL EVERY 4 HOURS PRN
Status: DISCONTINUED | OUTPATIENT
Start: 2021-03-01 | End: 2021-03-01 | Stop reason: HOSPADM

## 2021-03-01 RX ORDER — ONDANSETRON 2 MG/ML
4 INJECTION INTRAMUSCULAR; INTRAVENOUS ONCE AS NEEDED
Status: DISCONTINUED | OUTPATIENT
Start: 2021-03-01 | End: 2021-03-01 | Stop reason: HOSPADM

## 2021-03-01 RX ORDER — KETOROLAC TROMETHAMINE 30 MG/ML
INJECTION, SOLUTION INTRAMUSCULAR; INTRAVENOUS AS NEEDED
Status: DISCONTINUED | OUTPATIENT
Start: 2021-03-01 | End: 2021-03-01

## 2021-03-01 RX ORDER — DEXMEDETOMIDINE HYDROCHLORIDE 100 UG/ML
INJECTION, SOLUTION INTRAVENOUS AS NEEDED
Status: DISCONTINUED | OUTPATIENT
Start: 2021-03-01 | End: 2021-03-01

## 2021-03-01 RX ORDER — ROCURONIUM BROMIDE 10 MG/ML
INJECTION, SOLUTION INTRAVENOUS AS NEEDED
Status: DISCONTINUED | OUTPATIENT
Start: 2021-03-01 | End: 2021-03-01

## 2021-03-01 RX ORDER — GLYCOPYRROLATE 0.2 MG/ML
INJECTION INTRAMUSCULAR; INTRAVENOUS AS NEEDED
Status: DISCONTINUED | OUTPATIENT
Start: 2021-03-01 | End: 2021-03-01

## 2021-03-01 RX ORDER — CLINDAMYCIN PHOSPHATE 900 MG/50ML
900 INJECTION INTRAVENOUS ONCE
Status: COMPLETED | OUTPATIENT
Start: 2021-03-01 | End: 2021-03-01

## 2021-03-01 RX ADMIN — SODIUM CHLORIDE: 0.9 INJECTION, SOLUTION INTRAVENOUS at 11:20

## 2021-03-01 RX ADMIN — ONDANSETRON 4 MG: 2 INJECTION INTRAMUSCULAR; INTRAVENOUS at 12:25

## 2021-03-01 RX ADMIN — PROPOFOL 150 MCG/KG/MIN: 10 INJECTION, EMULSION INTRAVENOUS at 11:09

## 2021-03-01 RX ADMIN — GLYCOPYRROLATE 0.6 MG: 0.2 INJECTION, SOLUTION INTRAMUSCULAR; INTRAVENOUS at 12:25

## 2021-03-01 RX ADMIN — FENTANYL CITRATE 50 MCG: 50 INJECTION, SOLUTION INTRAMUSCULAR; INTRAVENOUS at 11:09

## 2021-03-01 RX ADMIN — CLINDAMYCIN PHOSPHATE 900 MG: 18 INJECTION, SOLUTION INTRAMUSCULAR; INTRAVENOUS at 11:04

## 2021-03-01 RX ADMIN — LIDOCAINE HYDROCHLORIDE 100 MG: 10 INJECTION, SOLUTION EPIDURAL; INFILTRATION; INTRACAUDAL; PERINEURAL at 11:09

## 2021-03-01 RX ADMIN — ONDANSETRON 4 MG: 2 INJECTION INTRAMUSCULAR; INTRAVENOUS at 11:09

## 2021-03-01 RX ADMIN — NEOSTIGMINE METHYLSULFATE 4 MG: 1 INJECTION INTRAVENOUS at 12:25

## 2021-03-01 RX ADMIN — OXYCODONE HYDROCHLORIDE 5 MG: 5 TABLET ORAL at 14:38

## 2021-03-01 RX ADMIN — FENTANYL CITRATE 25 MCG: 50 INJECTION INTRAMUSCULAR; INTRAVENOUS at 13:20

## 2021-03-01 RX ADMIN — DEXMEDETOMIDINE HYDROCHLORIDE 4 MCG: 100 INJECTION, SOLUTION INTRAVENOUS at 12:26

## 2021-03-01 RX ADMIN — MIDAZOLAM 4 MG: 1 INJECTION INTRAMUSCULAR; INTRAVENOUS at 11:04

## 2021-03-01 RX ADMIN — SODIUM CHLORIDE: 0.9 INJECTION, SOLUTION INTRAVENOUS at 11:42

## 2021-03-01 RX ADMIN — FENTANYL CITRATE 50 MCG: 50 INJECTION, SOLUTION INTRAMUSCULAR; INTRAVENOUS at 11:34

## 2021-03-01 RX ADMIN — SODIUM CHLORIDE 125 ML/HR: 0.9 INJECTION, SOLUTION INTRAVENOUS at 08:45

## 2021-03-01 RX ADMIN — HYDROMORPHONE HYDROCHLORIDE 0.5 MG: 1 INJECTION, SOLUTION INTRAMUSCULAR; INTRAVENOUS; SUBCUTANEOUS at 12:08

## 2021-03-01 RX ADMIN — DEXMEDETOMIDINE HYDROCHLORIDE 8 MCG: 100 INJECTION, SOLUTION INTRAVENOUS at 12:08

## 2021-03-01 RX ADMIN — PROPOFOL 200 MG: 10 INJECTION, EMULSION INTRAVENOUS at 11:09

## 2021-03-01 RX ADMIN — FENTANYL CITRATE 50 MCG: 50 INJECTION, SOLUTION INTRAMUSCULAR; INTRAVENOUS at 11:27

## 2021-03-01 RX ADMIN — METHYLENE BLUE 10 ML: 5 INJECTION INTRAVENOUS at 12:18

## 2021-03-01 RX ADMIN — ROCURONIUM BROMIDE 50 MG: 10 INJECTION, SOLUTION INTRAVENOUS at 11:09

## 2021-03-01 RX ADMIN — DEXMEDETOMIDINE HYDROCHLORIDE 8 MCG: 100 INJECTION, SOLUTION INTRAVENOUS at 11:42

## 2021-03-01 RX ADMIN — DEXAMETHASONE SODIUM PHOSPHATE 8 MG: 10 INJECTION, SOLUTION INTRAMUSCULAR; INTRAVENOUS at 11:09

## 2021-03-01 RX ADMIN — FENTANYL CITRATE 50 MCG: 50 INJECTION, SOLUTION INTRAMUSCULAR; INTRAVENOUS at 11:30

## 2021-03-01 RX ADMIN — KETOROLAC TROMETHAMINE 30 MG: 30 INJECTION, SOLUTION INTRAMUSCULAR at 12:25

## 2021-03-01 RX ADMIN — GENTAMICIN SULFATE 90 MG: 40 INJECTION, SOLUTION INTRAMUSCULAR; INTRAVENOUS at 11:20

## 2021-03-01 RX ADMIN — FENTANYL CITRATE 25 MCG: 50 INJECTION INTRAMUSCULAR; INTRAVENOUS at 12:55

## 2021-03-01 RX ADMIN — SCOPALAMINE 1 PATCH: 1 PATCH, EXTENDED RELEASE TRANSDERMAL at 09:21

## 2021-03-01 NOTE — INTERVAL H&P NOTE
H&P reviewed  After examining the patient I find no changes in the patients condition since the H&P had been written      Vitals:    03/01/21 0830   BP: 113/59   Pulse: 88   Resp: 16   Temp: 98 2 °F (36 8 °C)   SpO2: 98%

## 2021-03-01 NOTE — OP NOTE
OPERATIVE REPORT  PATIENT NAME: Mckenna Fink    :  1980  MRN: [id number]  Pt Location: [facility] OR ROOM 01    SURGERY DATE: 3/1/2021    Surgeon(s) and Role:     Sy Denis DO - Primary     * Melvyn Bamberger, MD - Gilberto Bender MD - Fellow    Preop Diagnosis:  Endometriosis [N80 9]  Pelvic pain in female [R10 2]    Post-Op Diagnosis Codes:     * Endometriosis [N80 9]     * Pelvic pain in female [R10 2]    Procedure(s) (LRB):  L S H , BILATERAL SALPINGECTOMY, RIGHT OOPHORECTOMY (N/A)  CYSTOSCOPY (N/A)    Specimen(s):  ID Type Source Tests Collected by Time Destination   1 : UTERUS, BILATERAL FALLOPIPAN TUBES, RIGHT OVARY Tissue Uterus TISSUE Jeanine Stone DO 3/1/2021 1207        Estimated Blood Loss:   Minimal    Drains:  [REMOVED] Urethral Catheter Double-lumen;Non-latex 12 Fr  (Removed)   Number of days: 0       Anesthesia Type:   General    Operative Indications:  Endometriosis [N80 9]  Pelvic pain in female [R10 2]      Operative Findings:  10 week sized fibroid uterus with normal fallopian tubes with evidence of tubal ligation bilaterally  6cm endometrioma incasing entire right ovary with normal left ovary  Dense anterior uterine adhesions to bladder peritoneum  Anterior abdominal omental adhesions  Complications:   None    Procedure and Technique:  Appropriate preoperative antibiotics chosen per ACOG guidelines were given  Bilateral SCDs were placed in the lower extremities for DVT prevention prior to the institution of anesthesia  No bladder, ureteral, viscus, or solid organ injury were noted at the end of the procedure  The patient was identified in the holding area by the operating room staff and attending physician  She was taken to the operating room where anesthesia was instituted without complications   She was placed in the dorsal lithotomy position with the legs in [address] [place] [address] with care taken to avoid excessive flexion or extension of her lower extremities  The patient was prepped and draped in the usual sterile fashion  A Weber catheter was inserted  A 10 cm HOLLY manipulator was placed for uterine manipulation  Next, the deepest part of the umbilicus was grasped and everted with an Allis clamp  The edges of the umbilicus were grasped with 2 towel clamps to elevate the abdominal wall away from the abdominal organs and vessels  A Veress needle was gently inserted into the umbilicus at a 90 degree angle  Once entry into the abdominal cavity was confirmed, the abdomen was insufflated with CO2 gas to 15 mmHg  Next, a 10 mm diagnostic laparoscope was inserted via direct entry into the umbilicus  The patient was then placed in Trendelenburg for better visualization of the pelvis  Next, a 5 mm trocar was inserted into the left lateral quadrant under direct visualization  Then a 5 mm trocar was inserted into the right lateral quadrant under direct visualization  First, a survey of the cavity was performed, and we confirmed the above-mentioned findings  First the anterior omental adhesions were brought down using the LigaSure device  We started a salpingectomy on the left side with the Ligasure device  The salpingectomy was performed by transecting the mesosalpinx of the left fallopian tube to the mesosalpinx to the level of the cornua  The left utero-ovarian ligament was coagulated and transected  The right fallopian tube and ovary were grafts and diverted medially to expose the infundibulopelvic ligament  The ureter was observed vermiculating medial to the infundibulopelvic ligament  The infundibulopelvic ligament was then grasped coagulated sealed and cut using the LigaSure device  The peritoneum was dissected just below the ovary to free the ovary from the pelvic sidewall paying careful attention to avoid damaging the ureter      Next, the round ligament was opened on the left side, and the anterior leaf of the broad ligament was dissected toward the level of the internal cervical os to develop the bladder flap  The left round ligament was then entered and the utero-ovarian ligament coagulated and transected  The bladder flap was completed from the left size and the uterine arteries were skeletonized, coagulated and transected  The right uterine arteries were coagulated and transected  Next, the bipolar Courtney loop was introduced and the loop tightened at the level of the cervical canal  Once the loop was cleared of bowel and other major structures, the loop was activated and the corpus of the uterus was amputated without difficulty  Next, a the left and right lateral trocar incisions were extended to 15 mm  A 15mm specimen bag was placed into the abdomen through this right lateral incision  Next, the 15 mm Courtney morcellator was carefully introduced into the abdomen through the left lateral port, and a tenaculum was introduced through this Courtney trocar to grasp the uterine fundus  The specimen was then placed into the bag  The specimen was then morcellated mechanically within the bag, without difficulty  The bag was then removed once the specimen was removed  [facility] instruments were then removed from the abdomen  The 15 mm fascial inciscions were then closed with 0 Vicryl suture using a facial closure device  The final laparoscopic survey of the pelvic cavity and we confirmed good hemostasis  The ureters were seen vermiculating normally bilaterally  The trocars were removed  The gas was allowed to escape  The skin incisions were closed with plain interrupted sutures without complications  The Weber catheter was then used to drain the bladder and then it was removed and a diagnostic cystoscopy was performed by instilling 180mL of fluid into the bladder  Both ureters were effluxing normally and there were no lacerations in the lower urinary tract  The patient tolerated the procedure well    The sponge, needle and instrument count were correct x 2  The patient tolerated the procedure well  She was awakened from anesthesia and transferred to the recovery room in stable condition  Dr Laure Garrido was present for the entire procedure      Patient Disposition:  PACU     SIGNATURE: Bairon Anderson MD  DATE: March 1, 2021  TIME: 12:34 PM

## 2021-03-01 NOTE — DISCHARGE INSTRUCTIONS
Hysterectomy Discharge Instructions    WHAT YOU NEED TO KNOW:   A hysterectomy is surgery to remove your uterus  Your ovaries, fallopian tubes, cervix, or part of your vagina may also need to be removed  The organs and tissue that will be removed depends on your medical condition  After a hysterectomy, you will not be able to become pregnant  If your ovaries are removed, you will go through menopause if you have not already  DISCHARGE INSTRUCTIONS:   Contact your doctor at the number above if:   · You have a fever over 101o  · You have nausea or are vomiting that does not improve after a light meal    · Your pain is getting worse, even after you take medicine  · You feel pain or burning when you urinate, or you have trouble urinating  · You have pus or a foul-smelling odor coming from your vagina  · Your wound is red, swollen, or draining pus  · You see new or an increased amount of bright red blood coming from your vagina or your incisions  · You have questions or concerns about your condition or care  Seek care immediately:   · Your arm or leg feels warm, tender, and painful  It may look swollen and red  · You have increasing abdominal or pelvic pain  · You have heavy vaginal bleeding that fills 1 or more sanitary pads in 1 hour  Call 911 for any of the following:   · You feel lightheaded, short of breath, and have chest pain  · You cough up blood  Medicines: You may need any of the following:  · Prescription medicine may be given  You may receive a prescription for pain medication or be advised to use over the counter (OTC) pain medication such as acetaminophen (Tylenol) or ibuprofen (Advil, Motrin)  Ask your healthcare provider how to take this medicine safely  · NSAIDs , such as ibuprofen, help decrease swelling, pain, and fever  NSAIDs can cause stomach bleeding or kidney problems in certain people   If you take blood thinner medicine, always ask your healthcare provider if NSAIDs are safe for you  Always read the medicine label and follow directions  · Stool softeners help treat or prevent constipation  · Take your medicine as directed  Contact your healthcare provider if you think your medicine is not helping or if you have side effects  Tell him or her if you are allergic to any medicine  Keep a list of the medicines, vitamins, and herbs you take  Include the amounts, and when and why you take them  Bring the list or the pill bottles to follow-up visits  Carry your medicine list with you in case of an emergency  Activity:   · Rest as needed  Get up and move around as directed to help prevent blood clots  Start with short walks and slowly increase the distance every day  Limit the number of times you climb stairs to 2 times each day for the first week  Plan most of your daily activities on one level of your home  · Do not lift objects heavier than 10 pounds for 6 weeks  Avoid strenuous activity for 2 weeks  · Do not strain during bowel movements  High-fiber foods and extra liquids can help you prevent constipation  Examples of high-fiber foods are fruit and bran  Prune juice and water are good liquids to drink  · Do not have sex, use tampons, or douche for up to 8 weeks  You may shower as soon as the day after surgery  Tub baths can be taken starting 2 weeks after surgery  Do not go into pools or hot tubs until cleared by your doctor  · Ask when it is safe for you to drive  It is generally safe to drive after 2 weeks and when no longer taking prescription pain medication  · Ask when you may return to work and to other regular activities  Wound care: Care for your abdominal incisions as directed  Carefully wash around the wound with soap and water  If you have Hibiclens or medicated soap that you were instructed to use before surgery, you may use that to wash with for up to 2 days after surgery    If not, any mild non-scented, non-abrasive soap is safe   It is okay to let the soap and water run over your incision  Do not scrub your incision  Dry the area and put on new, clean bandages as directed  Change your bandages when they get wet or dirty  If you have strips of medical tape, let them fall off on their own  It may take 7 to 14 days for them to fall off  Check your incision every day for redness, swelling, or pus  Deep breathing: Take deep breaths and cough 10 times each hour  This will decrease your risk for a lung infection  Take a deep breath and hold it for as long as you can  Let the air out and then cough strongly  Deep breaths help open your airway  You may be given an incentive spirometer to help you take deep breaths  Put the plastic piece in your mouth and take a slow, deep breath, then let the air out and cough  Repeat these steps 10 times every hour  Get support: This surgery may be life-changing for you and your family  You will no longer be able to get pregnant  Sudden changes in the levels of your hormones may occur and cause mood swings and depression  You may feel angry, sad, or frightened, or cry frequently and unexpectedly  These feelings are normal  Talk to your healthcare provider about where you can get support  You can also ask if hormone replacement medicine is right for you  Follow up with your healthcare provider or gynecologist as directed: You may need to return to have stitches removed, and for other tests  Write down your questions so you remember to ask them during your visits

## 2021-03-01 NOTE — ANESTHESIA PREPROCEDURE EVALUATION
Procedure:  L S H , POSS BSO (N/A Abdomen)    Relevant Problems   ANESTHESIA   (+) PONV (postoperative nausea and vomiting)      NEURO/PSYCH   (+) Anxiety and depression        Physical Exam    Airway    Mallampati score: II  TM Distance: >3 FB  Neck ROM: full     Dental   No notable dental hx     Cardiovascular  Rhythm: regular, Rate: normal, Cardiovascular exam normal    Pulmonary  Pulmonary exam normal Breath sounds clear to auscultation,     Other Findings        Anesthesia Plan  ASA Score- 2     Anesthesia Type- general with ASA Monitors  Additional Monitors:   Airway Plan: ETT  Comment: SCOP patch ordered  Plan Factors-    Chart reviewed  Existing labs reviewed  Patient summary reviewed  Patient is not a current smoker  Patient instructed to abstain from smoking on day of procedure  Patient did not smoke on day of surgery  Induction- intravenous  Postoperative Plan-     Informed Consent- Anesthetic plan and risks discussed with patient and spouse Real Maloney

## 2021-03-03 ENCOUNTER — DOCUMENTATION (OUTPATIENT)
Dept: GYNECOLOGY | Facility: CLINIC | Age: 41
End: 2021-03-03

## 2021-03-03 NOTE — PROGRESS NOTES
Called pt post op follow up call she is doing well, staying ahead of the pain with percocet,  prn and Motrin  Incisions look fine no sign of infection,  No BM yet but has a lot of gas today    Post op appt made for 3/16 will call if she needs anything until then

## 2021-03-16 ENCOUNTER — OFFICE VISIT (OUTPATIENT)
Dept: GYNECOLOGY | Facility: CLINIC | Age: 41
End: 2021-03-16

## 2021-03-16 VITALS
HEIGHT: 65 IN | BODY MASS INDEX: 28.02 KG/M2 | HEART RATE: 94 BPM | WEIGHT: 168.2 LBS | SYSTOLIC BLOOD PRESSURE: 110 MMHG | DIASTOLIC BLOOD PRESSURE: 70 MMHG

## 2021-03-16 DIAGNOSIS — Z48.89 POSTOPERATIVE VISIT: Primary | ICD-10-CM

## 2021-03-16 PROCEDURE — 99024 POSTOP FOLLOW-UP VISIT: CPT | Performed by: OBSTETRICS & GYNECOLOGY

## 2021-03-16 PROCEDURE — 3008F BODY MASS INDEX DOCD: CPT | Performed by: OBSTETRICS & GYNECOLOGY

## 2021-03-16 NOTE — PROGRESS NOTES
Patient presents for postoperative check she is status post [address] and left salpingectomy secondary to fibroid uterus and endometriosis  Final pathology report revealed endometrioma with adenomyosis and fibroid uterus  Patient is doing well since the surgery with no complaints  Physical exam: All port sites are healing well with no erythema or exudate her induration      Impression:  Normal postoperative check     Plan:  Refer return to Dr Jethro Christopher for ongoing gyn care

## 2021-03-16 NOTE — LETTER
March 16, 2021     Lona Simental MD  [address]  [address]  [address] [place] [address]    Patient: Baron Smoke   YOB: 1980   Date of Visit: 3/16/2021       DeaElroy,    Thank you for referring Carlos Farrell to me for evaluation  Below are my notes for this consultation  If you have questions, please do not hesitate to call me  I look forward to following your patient along with you  Sincerely,        Jason Flores DO        CC: No Recipients  Jason Flores DO  3/16/2021  9:49 AM  Incomplete  Patient presents for postoperative check she is status post [address] and left salpingectomy secondary to fibroid uterus and endometriosis  Final pathology report revealed endometrioma with adenomyosis and fibroid uterus  Patient is doing well since the surgery with no complaints  Physical exam: All port sites are healing well with no erythema or exudate her induration      Impression:  Normal postoperative check     Plan:  Refer return to Dr Luke Hanna for ongoing gyn care

## 2021-04-06 DIAGNOSIS — Z23 ENCOUNTER FOR IMMUNIZATION: ICD-10-CM

## 2021-04-13 ENCOUNTER — IMMUNIZATIONS (OUTPATIENT)
Dept: FAMILY MEDICINE CLINIC | Facility: HOSPITAL | Age: 41
End: 2021-04-13

## 2021-04-13 DIAGNOSIS — Z23 ENCOUNTER FOR IMMUNIZATION: Primary | ICD-10-CM

## 2021-04-13 PROCEDURE — 0001A SARS-COV-2 / COVID-19 MRNA VACCINE (PFIZER-BIONTECH) 30 MCG: CPT

## 2021-04-13 PROCEDURE — 91300 SARS-COV-2 / COVID-19 MRNA VACCINE (PFIZER-BIONTECH) 30 MCG: CPT

## 2021-04-21 DIAGNOSIS — F41.9 ANXIETY AND DEPRESSION: ICD-10-CM

## 2021-04-21 DIAGNOSIS — F32.A ANXIETY AND DEPRESSION: ICD-10-CM

## 2021-04-21 RX ORDER — ESCITALOPRAM OXALATE 10 MG/1
TABLET ORAL
Qty: 90 TABLET | Refills: 1 | Status: SHIPPED | OUTPATIENT
Start: 2021-04-21 | End: 2021-08-23

## 2021-04-22 ENCOUNTER — OFFICE VISIT (OUTPATIENT)
Dept: OBGYN CLINIC | Facility: CLINIC | Age: 41
End: 2021-04-22
Payer: COMMERCIAL

## 2021-04-22 VITALS
SYSTOLIC BLOOD PRESSURE: 116 MMHG | WEIGHT: 167 LBS | HEIGHT: 65 IN | BODY MASS INDEX: 27.82 KG/M2 | DIASTOLIC BLOOD PRESSURE: 76 MMHG

## 2021-04-22 DIAGNOSIS — N93.9 VAGINAL BLEEDING: Primary | ICD-10-CM

## 2021-04-22 DIAGNOSIS — Z90.711 S/P LAPAROSCOPIC SUPRACERVICAL HYSTERECTOMY: ICD-10-CM

## 2021-04-22 PROCEDURE — 1036F TOBACCO NON-USER: CPT | Performed by: NURSE PRACTITIONER

## 2021-04-22 PROCEDURE — 3008F BODY MASS INDEX DOCD: CPT | Performed by: NURSE PRACTITIONER

## 2021-04-22 PROCEDURE — 99213 OFFICE O/P EST LOW 20 MIN: CPT | Performed by: NURSE PRACTITIONER

## 2021-04-22 NOTE — PROGRESS NOTES
Malou LONDON Danae 81-year-old presents with c/o vaginal bleeding  She is 2 months post-op with a supracervical hysterectomy  She noticed today some small amount of bleeding coming from her vagina  She was under the impression that after her hysterectomy she would not experience bleeding  ROS:  As indicated in HPI  All other ROS negative  Physical Exam  Constitutional:       Appearance: Normal appearance  Genitourinary:      Pelvic exam was performed with patient in the lithotomy position  Vulva and vagina normal       Cervix is not parous  Cervical bleeding (small amount of bleeding noted) present  No cervical friability, lesion, polyp or nabothian cyst       Uterus is absent  HENT:      Head: Normocephalic and atraumatic  Neck:      Musculoskeletal: Neck supple  Neurological:      Mental Status: She is alert  Skin:     General: Skin is warm  Psychiatric:         Behavior: Behavior is cooperative  Vitals signs and nursing note reviewed  Diagnoses and all orders for this visit:    Vaginal bleeding    S/P laparoscopic supracervical hysterectomy        I explained that it is not uncommon to experience small amount of bleeding with a supracervical hysterectomy  I explained the cervical is vascular and she still has an endocervical canal cells from which she can experience some mild bleeding  Patient reassured that this is not abnormal    Follow-up as needed

## 2021-05-04 ENCOUNTER — IMMUNIZATIONS (OUTPATIENT)
Dept: FAMILY MEDICINE CLINIC | Facility: HOSPITAL | Age: 41
End: 2021-05-04

## 2021-05-04 DIAGNOSIS — Z23 ENCOUNTER FOR IMMUNIZATION: Primary | ICD-10-CM

## 2021-05-04 PROCEDURE — 0002A SARS-COV-2 / COVID-19 MRNA VACCINE (PFIZER-BIONTECH) 30 MCG: CPT

## 2021-05-04 PROCEDURE — 91300 SARS-COV-2 / COVID-19 MRNA VACCINE (PFIZER-BIONTECH) 30 MCG: CPT

## 2021-08-04 ENCOUNTER — OFFICE VISIT (OUTPATIENT)
Dept: URGENT CARE | Facility: CLINIC | Age: 41
End: 2021-08-04
Payer: COMMERCIAL

## 2021-08-04 VITALS
WEIGHT: 167 LBS | HEIGHT: 65 IN | OXYGEN SATURATION: 96 % | BODY MASS INDEX: 27.82 KG/M2 | RESPIRATION RATE: 16 BRPM | TEMPERATURE: 97 F | HEART RATE: 84 BPM

## 2021-08-04 DIAGNOSIS — J02.9 SORE THROAT: Primary | ICD-10-CM

## 2021-08-04 DIAGNOSIS — R09.82 PND (POST-NASAL DRIP): ICD-10-CM

## 2021-08-04 LAB — S PYO AG THROAT QL: NEGATIVE

## 2021-08-04 PROCEDURE — S9083 URGENT CARE CENTER GLOBAL: HCPCS | Performed by: PHYSICIAN ASSISTANT

## 2021-08-04 PROCEDURE — G0382 LEV 3 HOSP TYPE B ED VISIT: HCPCS | Performed by: PHYSICIAN ASSISTANT

## 2021-08-04 PROCEDURE — 87880 STREP A ASSAY W/OPTIC: CPT | Performed by: PHYSICIAN ASSISTANT

## 2021-08-04 PROCEDURE — U0003 INFECTIOUS AGENT DETECTION BY NUCLEIC ACID (DNA OR RNA); SEVERE ACUTE RESPIRATORY SYNDROME CORONAVIRUS 2 (SARS-COV-2) (CORONAVIRUS DISEASE [COVID-19]), AMPLIFIED PROBE TECHNIQUE, MAKING USE OF HIGH THROUGHPUT TECHNOLOGIES AS DESCRIBED BY CMS-2020-01-R: HCPCS | Performed by: PHYSICIAN ASSISTANT

## 2021-08-04 PROCEDURE — U0005 INFEC AGEN DETEC AMPLI PROBE: HCPCS | Performed by: PHYSICIAN ASSISTANT

## 2021-08-04 NOTE — PROGRESS NOTES
[address]        NAME: Mercedes Miller is a 46 y o  female  : 1980    MRN: [id number]  DATE: 2021  TIME: 12:50 PM    Assessment and Plan   Sore throat [J02 9]  1  Sore throat  POCT rapid strepA    Novel Coronavirus (Covid-19),PCR Iker Manjarrez - Office Collection   2  PND (post-nasal drip)           Patient Instructions     Swabbed for COVID-19, discussed self quarantine until contacted with results  Monitor for worsening symptoms  C/w OTC symptom relief including tylenol, fluids, rest  Recommend supplementing with vitamins D & C as well as a multivitamin  Discussed etiology is most likely viral  Rapid strep negative  Follow up with PCP in 3-5 days  Proceed to  ER if symptoms worsen  Chief Complaint     Chief Complaint   Patient presents with    Sore Throat     started today    Sinusitis     since monday    Headache     started today    COVID-19     had both shots         History of Present Illness        Patient presents with complaint of cold symptoms since this morning  She reports postnasal drip, sore throat, and headache  She states that the headaches have been ongoing for few days  Patient states that she and her family just returned from the beach on Saturday and all 3 of her children have similar symptoms  She denies fever, chills, sweats, abdominal pain, nausea, vomiting, diarrhea, chest tightness, and dyspnea  She reports that she is vaccinated against COVID-19  Review of Systems   Review of Systems   Constitutional: Positive for fatigue  Negative for chills and fever  HENT: Positive for postnasal drip and sore throat  Negative for congestion and ear pain  Eyes: Negative for pain and visual disturbance  Respiratory: Positive for cough  Negative for shortness of breath  Cardiovascular: Negative for chest pain and palpitations  Gastrointestinal: Negative for abdominal pain and vomiting  Genitourinary: Negative for dysuria and hematuria     Musculoskeletal: Negative for arthralgias and back pain  Skin: Negative for color change and rash  Neurological: Positive for headaches  Negative for seizures and syncope  All other systems reviewed and are negative          Current Medications       Current Outpatient Medications:     cetirizine (ZyrTEC) 10 mg tablet, Take 10 mg by mouth daily at bedtime , Disp: , Rfl:     escitalopram (LEXAPRO) 10 mg tablet, TAKE 1 TABLET BY MOUTH EVERY DAY, Disp: 90 tablet, Rfl: 1    fluticasone (FLONASE) 50 mcg/act nasal spray, 2 sprays into each nostril daily, Disp: 1 Bottle, Rfl: 0    Cholecalciferol (VITAMIN D) 2000 units CAPS, Take by mouth daily (Patient not taking: Reported on 8/4/2021), Disp: , Rfl:     famotidine (PEPCID) 20 mg tablet, Take 1 tablet (20 mg total) by mouth 2 (two) times a day (Patient not taking: Reported on 8/4/2021), Disp: 30 tablet, Rfl: 0    methocarbamol (ROBAXIN) 750 mg tablet, Take 1 tablet (750 mg total) by mouth 3 (three) times a day as needed for muscle spasms (tension headache) (Patient not taking: Reported on 3/16/2021), Disp: 30 tablet, Rfl: 0    ondansetron (ZOFRAN-ODT) 4 mg disintegrating tablet, Take 1 tablet (4 mg total) by mouth every 6 (six) hours as needed for nausea or vomiting (Patient not taking: Reported on 3/16/2021), Disp: 20 tablet, Rfl: 0    oxyCODONE-acetaminophen (PERCOCET) 5-325 mg per tablet, Take 1 tablet by mouth every 4 (four) hours as needed for severe pain for up to 15 dosesMax Daily Amount: 6 tablets (Patient not taking: Reported on 3/16/2021), Disp: 15 tablet, Rfl: 0    pantoprazole (PROTONIX) 40 mg tablet, TAKE 1 TABLET BY MOUTH EVERY DAY (Patient not taking: Reported on 4/22/2021), Disp: 30 tablet, Rfl: 3    pseudoephedrine (SUDAFED) 120 MG 12 hr tablet, Take 120 mg by mouth 2 (two) times a day PRN (Patient not taking: Reported on 8/4/2021), Disp: , Rfl:     varenicline (CHANTIX CARL) 0 5 MG X 11 & 1 MG X 42 tablet, Take one 0 5 mg tablet by mouth once daily for 3 days, then one 0 5 mg tablet by mouth twice daily for 4 days, then one 1 mg tablet by mouth twice daily   (Patient not taking: Reported on 3/16/2021), Disp: 53 tablet, Rfl: 0    varenicline (CHANTIX) 1 mg tablet, Take 1 tablet (1 mg total) by mouth 2 (two) times a day (Patient not taking: Reported on 2021), Disp: 60 tablet, Rfl: 4    Current Allergies     Allergies as of 2021 - Reviewed 2021   Allergen Reaction Noted    Cefprozil Shortness Of Breath 05/15/2017    Pertussis vaccine      Shellfish-derived products - food allergy Hives 2017    Amoxicillin  2017            The following portions of the patient's history were reviewed and updated as appropriate: allergies, current medications, past family history, past medical history, past social history, past surgical history and problem list      Past Medical History:   Diagnosis Date    Anxiety     Chronic kidney disease     [id number]    Depression     Disease of thyroid gland     nodule    Endometriosis     Female infertility     iui with prior preg    GERD (gastroesophageal reflux disease)     Gestational diabetes     Kidney stone     PONV (postoperative nausea and vomiting)     gets "very sick"    Renal calculi     last assessed 10/20/14; US 10/2014 3mm and 4mm right, non obstructing    Varicella     childhood       Past Surgical History:   Procedure Laterality Date    APPENDECTOMY       SECTION      CYSTOSCOPY N/A 3/1/2021    Procedure: CYSTOSCOPY;  Surgeon: Basim Mckeon DO;  Location: [facility];  Service: Gynecology    DIAGNOSTIC LAPAROSCOPY      multiple, last 10/2012, 10/2014    LAPAROSCOPIC OVARIAN CYSTECTOMY      LASER LAPAROSCOPY      NASAL SEPTUM SURGERY      AZ  DELIVERY ONLY N/A 2017    Procedure:  SECTION () REPEAT;  Surgeon: Faustino Hameed DO;  Location: [facility];  Service: Obstetrics    [place] LAP, SUPRACERVIAL HYSTERECTOMY, <250G N/A 3/1/2021 Procedure: L S H , BILATERAL SALPINGECTOMY, RIGHT OOPHORECTOMY;  Surgeon: Va Marin DO;  Location: [facility];  Service: Gynecology    MA LIGATION,FALLOPIAN TUBE W/ Bilateral 2017    Procedure: LIGATION/COAGULATION TUBAL;  Surgeon: Radha Flores DO;  [facility];  Service: Obstetrics    TONSILLECTOMY      TUBAL LIGATION         Family History   Problem Relation Age of Onset    Depression Mother     Hypertension Mother     Miscarriages / [place] Mother     Anxiety disorder Mother         NOS    Heart defect Mother         aortic valve disorder    Endometriosis Mother     Cancer Sister         breast    Alcohol abuse Maternal Uncle     Cancer Maternal Grandmother         colon    Depression Maternal Grandmother     Dementia Maternal Grandmother     Vision loss Maternal Grandmother     Arthritis Paternal Grandmother     Hearing loss Paternal Grandmother     Heart disease Paternal Grandmother     Stroke Paternal Grandmother    [de-identified] / Stillbirths Paternal [de-identified]     Colon cancer Paternal [de-identified]     Hyperlipidemia Father         high cholesterol    Endometriosis Sister          Medications have been verified  Objective   Pulse 84   Temp (!) 97 °F (36 1 °C)   Resp 16   Ht 5' 5" (1 651 m)   Wt 75 8 kg (167 lb)   LMP 2019 (Exact Date)   SpO2 96%   BMI 27 79 kg/m²   Patient's last menstrual period was 2019 (exact date)  Physical Exam     Physical Exam  Vitals and nursing note reviewed  Constitutional:       General: She is not in acute distress  Appearance: Normal appearance  She is well-developed  She is not ill-appearing or diaphoretic  HENT:      Head: Normocephalic and atraumatic  Right Ear: Tympanic membrane and ear canal normal  Tympanic membrane is not erythematous  Left Ear: Tympanic membrane and ear canal normal  Tympanic membrane is not erythematous        Mouth/Throat:      Mouth: Mucous membranes are moist       Pharynx: Oropharynx is clear  Posterior oropharyngeal erythema present  No oropharyngeal exudate  Tonsils: No tonsillar exudate  1+ on the right  1+ on the left  Eyes:      Conjunctiva/sclera: Conjunctivae normal       Pupils: Pupils are equal, round, and reactive to light  Cardiovascular:      Rate and Rhythm: Normal rate and regular rhythm  Heart sounds: Normal heart sounds  Pulmonary:      Effort: Pulmonary effort is normal  No respiratory distress  Breath sounds: Normal breath sounds  No stridor  No wheezing, rhonchi or rales  Musculoskeletal:      Cervical back: Normal range of motion and neck supple  Lymphadenopathy:      Cervical: Cervical adenopathy present  Skin:     General: Skin is warm and dry  Capillary Refill: Capillary refill takes less than 2 seconds  Findings: No rash  Neurological:      Mental Status: She is alert and oriented to person, place, and time  Cranial Nerves: No cranial nerve deficit  Sensory: No sensory deficit  Psychiatric:         Behavior: Behavior normal          Thought Content:  Thought content normal

## 2021-08-05 LAB — SARS-COV-2 RNA RESP QL NAA+PROBE: NEGATIVE

## 2021-08-22 DIAGNOSIS — F32.A ANXIETY AND DEPRESSION: ICD-10-CM

## 2021-08-22 DIAGNOSIS — F41.9 ANXIETY AND DEPRESSION: ICD-10-CM

## 2021-08-23 RX ORDER — ESCITALOPRAM OXALATE 10 MG/1
TABLET ORAL
Qty: 90 TABLET | Refills: 1 | Status: SHIPPED | OUTPATIENT
Start: 2021-08-23 | End: 2022-03-08

## 2021-11-15 ENCOUNTER — OFFICE VISIT (OUTPATIENT)
Dept: FAMILY MEDICINE CLINIC | Facility: CLINIC | Age: 41
End: 2021-11-15
Payer: COMMERCIAL

## 2021-11-15 VITALS
DIASTOLIC BLOOD PRESSURE: 60 MMHG | OXYGEN SATURATION: 98 % | BODY MASS INDEX: 28.32 KG/M2 | HEART RATE: 95 BPM | HEIGHT: 65 IN | WEIGHT: 170 LBS | SYSTOLIC BLOOD PRESSURE: 118 MMHG | RESPIRATION RATE: 16 BRPM | TEMPERATURE: 98 F

## 2021-11-15 DIAGNOSIS — Z23 FLU VACCINE NEED: ICD-10-CM

## 2021-11-15 DIAGNOSIS — F32.A ANXIETY AND DEPRESSION: ICD-10-CM

## 2021-11-15 DIAGNOSIS — F41.9 ANXIETY AND DEPRESSION: ICD-10-CM

## 2021-11-15 DIAGNOSIS — R39.9 UTI SYMPTOMS: Primary | ICD-10-CM

## 2021-11-15 LAB
BACTERIA UR QL AUTO: NORMAL /HPF
BILIRUB UR QL STRIP: NEGATIVE
CLARITY UR: CLEAR
COLOR UR: YELLOW
GLUCOSE UR STRIP-MCNC: NEGATIVE MG/DL
HGB UR QL STRIP.AUTO: NEGATIVE
HYALINE CASTS #/AREA URNS LPF: NORMAL /LPF
KETONES UR STRIP-MCNC: NEGATIVE MG/DL
LEUKOCYTE ESTERASE UR QL STRIP: NEGATIVE
NITRITE UR QL STRIP: NEGATIVE
NON-SQ EPI CELLS URNS QL MICRO: NORMAL /HPF
PH UR STRIP.AUTO: 8 [PH]
PROT UR STRIP-MCNC: NEGATIVE MG/DL
RBC #/AREA URNS AUTO: NORMAL /HPF
SL AMB  POCT GLUCOSE, UA: NORMAL
SL AMB LEUKOCYTE ESTERASE,UA: NORMAL
SL AMB POCT BILIRUBIN,UA: NORMAL
SL AMB POCT BLOOD,UA: NORMAL
SL AMB POCT CLARITY,UA: NORMAL
SL AMB POCT COLOR,UA: NORMAL
SL AMB POCT KETONES,UA: NORMAL
SL AMB POCT NITRITE,UA: NORMAL
SL AMB POCT PH,UA: 7.5
SL AMB POCT SPECIFIC GRAVITY,UA: 1.01
SL AMB POCT URINE PROTEIN: NORMAL
SL AMB POCT UROBILINOGEN: 0.2
SP GR UR STRIP.AUTO: 1.02 (ref 1–1.03)
UROBILINOGEN UR QL STRIP.AUTO: 1 E.U./DL
WBC #/AREA URNS AUTO: NORMAL /HPF

## 2021-11-15 PROCEDURE — 1036F TOBACCO NON-USER: CPT | Performed by: FAMILY MEDICINE

## 2021-11-15 PROCEDURE — 99213 OFFICE O/P EST LOW 20 MIN: CPT | Performed by: FAMILY MEDICINE

## 2021-11-15 PROCEDURE — 81001 URINALYSIS AUTO W/SCOPE: CPT | Performed by: FAMILY MEDICINE

## 2021-11-15 PROCEDURE — 90686 IIV4 VACC NO PRSV 0.5 ML IM: CPT

## 2021-11-15 PROCEDURE — 81003 URINALYSIS AUTO W/O SCOPE: CPT | Performed by: FAMILY MEDICINE

## 2021-11-15 PROCEDURE — 90471 IMMUNIZATION ADMIN: CPT

## 2021-11-15 PROCEDURE — 3008F BODY MASS INDEX DOCD: CPT | Performed by: FAMILY MEDICINE

## 2021-11-15 PROCEDURE — 87077 CULTURE AEROBIC IDENTIFY: CPT | Performed by: FAMILY MEDICINE

## 2021-11-15 PROCEDURE — 87086 URINE CULTURE/COLONY COUNT: CPT | Performed by: FAMILY MEDICINE

## 2021-11-15 PROCEDURE — 87186 SC STD MICRODIL/AGAR DIL: CPT | Performed by: FAMILY MEDICINE

## 2021-11-15 RX ORDER — NITROFURANTOIN 25; 75 MG/1; MG/1
100 CAPSULE ORAL 2 TIMES DAILY
Qty: 10 CAPSULE | Refills: 0 | Status: SHIPPED | OUTPATIENT
Start: 2021-11-15 | End: 2021-11-20

## 2021-11-17 LAB — BACTERIA UR CULT: ABNORMAL

## 2021-12-15 ENCOUNTER — IMMUNIZATIONS (OUTPATIENT)
Dept: FAMILY MEDICINE CLINIC | Facility: HOSPITAL | Age: 41
End: 2021-12-15

## 2021-12-15 DIAGNOSIS — Z23 ENCOUNTER FOR IMMUNIZATION: Primary | ICD-10-CM

## 2021-12-15 PROCEDURE — 91300 COVID-19 PFIZER VACC 0.3 ML: CPT

## 2021-12-15 PROCEDURE — 0001A COVID-19 PFIZER VACC 0.3 ML: CPT

## 2022-01-31 ENCOUNTER — ANNUAL EXAM (OUTPATIENT)
Dept: OBGYN CLINIC | Facility: CLINIC | Age: 42
End: 2022-01-31
Payer: COMMERCIAL

## 2022-01-31 VITALS
DIASTOLIC BLOOD PRESSURE: 68 MMHG | HEIGHT: 65 IN | SYSTOLIC BLOOD PRESSURE: 126 MMHG | BODY MASS INDEX: 28.49 KG/M2 | WEIGHT: 171 LBS

## 2022-01-31 DIAGNOSIS — Z01.419 WOMEN'S ANNUAL ROUTINE GYNECOLOGICAL EXAMINATION: Primary | ICD-10-CM

## 2022-01-31 DIAGNOSIS — Z12.31 BREAST CANCER SCREENING BY MAMMOGRAM: ICD-10-CM

## 2022-01-31 PROCEDURE — S0612 ANNUAL GYNECOLOGICAL EXAMINA: HCPCS | Performed by: NURSE PRACTITIONER

## 2022-01-31 PROCEDURE — G0145 SCR C/V CYTO,THINLAYER,RESCR: HCPCS | Performed by: NURSE PRACTITIONER

## 2022-01-31 PROCEDURE — G0476 HPV COMBO ASSAY CA SCREEN: HCPCS | Performed by: NURSE PRACTITIONER

## 2022-01-31 NOTE — PROGRESS NOTES
Subjective    HPI:     Poncho Rolle is a 46 y o  female  She is a [place] 6 Para 2, with C/S x 2 (twin girls and a son)  She had a supracervical hysterectomy with one oophorectomy in 2021  She denies issues with intimacy  She denies /GI and Gyn complaints  She feels safe at home  She states her depression/anxiety is stable  Medical, surgical and family history reviewed  Her dental care is up-to-date  She tries to eat a healthy diet  She is not happy with her recent weight gain  Gynecologic History    Patient's last menstrual period was 2019 (exact date)  Last Pap: 18  Results were: normal    Obstetric History    OB History    Para Term  AB Living   6 2 1 1 4 3   SAB IAB Ectopic Multiple Live Births   4     1 3      # Outcome Date GA Lbr Favio/2nd Weight Sex Delivery Anes PTL Lv   6 Term 17 38w5d  4139 g (9 lb 2 oz) M CS-LTranv Spinal  MAUREEN   5A  08/24/15 32w6d  2126 g (4 lb 11 oz) F CS-Unspec Spinal  MAUREEN   5B  08/24/15 32w6d  1928 g (4 lb 4 oz) F CS-Unspec Spinal  MAUREEN   4 SAB            3 SAB            2 SAB            1 SAB                The following portions of the patient's history were reviewed and updated as appropriate: allergies, current medications, past family history, past medical history, past social history, past surgical history and problem list     Review of Systems    Pertinent items are noted in HPI  Objective    Physical Exam  Constitutional:       Appearance: Normal appearance  She is well-developed  Genitourinary:      Vulva, bladder and urethral meatus normal       No lesions in the vagina  Right Labia: Bartholin's cyst (right gland enlarged  No evidence of infection  )  Right Labia: No rash, tenderness, lesions or skin changes  Left Labia: No tenderness, lesions, skin changes, Bartholin's cyst or rash  No labial fusion noted  No inguinal adenopathy present in the right or left side       No vaginal discharge, erythema, tenderness, bleeding or granulation tissue  No vaginal prolapse present  No vaginal atrophy present  Right Adnexa: absent  Left Adnexa: not tender, not full and no mass present  Cervix is not parous  No cervical motion tenderness, discharge, friability, lesion, polyp or nabothian cyst       Uterus is absent  Pelvic exam was performed with patient in the lithotomy position  Breasts: Breasts are symmetrical       Right: No inverted nipple, mass, nipple discharge, skin change, tenderness, axillary adenopathy or supraclavicular adenopathy  Left: No inverted nipple, mass, nipple discharge, skin change, tenderness, axillary adenopathy or supraclavicular adenopathy  HENT:      Head: Normocephalic and atraumatic  Neck:      Thyroid: No thyromegaly  Cardiovascular:      Rate and Rhythm: Normal rate and regular rhythm  Heart sounds: Normal heart sounds, S1 normal and S2 normal    Pulmonary:      Effort: Pulmonary effort is normal       Breath sounds: Normal breath sounds  Abdominal:      General: Bowel sounds are normal  There is no distension  Palpations: Abdomen is soft  There is no mass  Tenderness: There is no abdominal tenderness  There is no guarding  Hernia: There is no hernia in the left inguinal area or right inguinal area  Musculoskeletal:      Cervical back: Neck supple  Lymphadenopathy:      Cervical: No cervical adenopathy  Upper Body:      Right upper body: No supraclavicular or axillary adenopathy  Left upper body: No supraclavicular or axillary adenopathy  Lower Body: No right inguinal adenopathy  No left inguinal adenopathy  Neurological:      Mental Status: She is alert  Skin:     General: Skin is warm and dry  Findings: No rash     Psychiatric:         Attention and Perception: Attention and perception normal          Mood and Affect: Mood and affect normal          Speech: Speech normal  Behavior: Behavior is cooperative  Thought Content: Thought content normal          Cognition and Memory: Cognition and memory normal          Judgment: Judgment normal    Vitals and nursing note reviewed  Assessment and Plan    Carlos Greene was seen today for gynecologic exam     Diagnoses and all orders for this visit:    Women's annual routine gynecological examination    Breast cancer screening by mammogram  -     Mammo screening bilateral w 3d & cad; Future      Patient informed of a Stable GYN exam  A pap smear was performed  I have discussed the importance of exercise and healthy diet as well as adequate intake of calcium and vitamin D  The current ASCCP guidelines were reviewed  The low risk patient will receive pap smear screening every 3 years until the age of 34 and then every 3 to 5 years with HPV co-testing from the ages of 33-67  I emphasized the importance of an annual pelvic and breast exam  A yearly mammogram is recommended for breast cancer screening starting at age 36  Results will be released to [facility], if abnormal will call to review and discuss treatment plan  All questions have been answered to her satisfaction  Mammogram ordered  Follow up in: 1 year

## 2022-02-02 ENCOUNTER — OFFICE VISIT (OUTPATIENT)
Dept: FAMILY MEDICINE CLINIC | Facility: CLINIC | Age: 42
End: 2022-02-02
Payer: COMMERCIAL

## 2022-02-02 VITALS
BODY MASS INDEX: 28.66 KG/M2 | HEART RATE: 106 BPM | TEMPERATURE: 98 F | DIASTOLIC BLOOD PRESSURE: 60 MMHG | OXYGEN SATURATION: 96 % | SYSTOLIC BLOOD PRESSURE: 110 MMHG | WEIGHT: 172 LBS | RESPIRATION RATE: 18 BRPM | HEIGHT: 65 IN

## 2022-02-02 DIAGNOSIS — Z00.00 ENCOUNTER FOR WELLNESS EXAMINATION IN ADULT: Primary | ICD-10-CM

## 2022-02-02 DIAGNOSIS — R53.82 CHRONIC FATIGUE: ICD-10-CM

## 2022-02-02 DIAGNOSIS — Z13.220 NEED FOR LIPID SCREENING: ICD-10-CM

## 2022-02-02 DIAGNOSIS — E55.9 VITAMIN D DEFICIENCY: ICD-10-CM

## 2022-02-02 DIAGNOSIS — F32.A ANXIETY AND DEPRESSION: ICD-10-CM

## 2022-02-02 DIAGNOSIS — G47.00 INSOMNIA, UNSPECIFIED TYPE: ICD-10-CM

## 2022-02-02 DIAGNOSIS — J30.2 SEASONAL ALLERGIC RHINITIS, UNSPECIFIED TRIGGER: ICD-10-CM

## 2022-02-02 DIAGNOSIS — F41.9 ANXIETY AND DEPRESSION: ICD-10-CM

## 2022-02-02 DIAGNOSIS — E04.1 THYROID NODULE: ICD-10-CM

## 2022-02-02 LAB
HPV HR 12 DNA CVX QL NAA+PROBE: NEGATIVE
HPV16 DNA CVX QL NAA+PROBE: NEGATIVE
HPV18 DNA CVX QL NAA+PROBE: NEGATIVE

## 2022-02-02 PROCEDURE — 3008F BODY MASS INDEX DOCD: CPT | Performed by: FAMILY MEDICINE

## 2022-02-02 PROCEDURE — 99395 PREV VISIT EST AGE 18-39: CPT | Performed by: FAMILY MEDICINE

## 2022-02-02 PROCEDURE — 1036F TOBACCO NON-USER: CPT | Performed by: FAMILY MEDICINE

## 2022-02-02 RX ORDER — HYDROXYZINE HYDROCHLORIDE 25 MG/1
TABLET, FILM COATED ORAL
Qty: 30 TABLET | Refills: 1 | Status: SHIPPED | OUTPATIENT
Start: 2022-02-02 | End: 2022-02-10

## 2022-02-02 NOTE — ASSESSMENT & PLAN NOTE
Tiny nodule right lobe 2016   Mild thyromegaly on exam  Proceed with repeat thyroid ultrasound and labs including TSH

## 2022-02-02 NOTE — ASSESSMENT & PLAN NOTE
Recurrent  sinus congestion  Headaches- tension and cervicogenic - work related pressure  Patient will stop Zyrtec which has been ineffective lately  Trial of Atarax 25 to 50 mg q h s  p r n  Miguel Bray     She may use known sedating antihistamine such as Allegra Claritin in the morning if needed

## 2022-02-02 NOTE — ASSESSMENT & PLAN NOTE
Symptoms are well controlled on Lexapro 10 mg daily    Persistent insomnia - trouble sleeping in spite of melatonin, likely due to significant work related stress  Trial of Atarax

## 2022-02-02 NOTE — PROGRESS NOTES
FAMILY PRACTICE OFFICE VISIT       NAME: Brock Fink  AGE: 46 y o  SEX: female       : 1980        MRN: [id number]        Assessment and Plan     1  Encounter for wellness examination in adult    2  Anxiety and depression  Assessment & Plan:  Symptoms are well controlled on Lexapro 10 mg daily    Persistent insomnia - trouble sleeping in spite of melatonin, likely due to significant work related stress  Trial of Atarax        3  Seasonal allergic rhinitis, unspecified trigger  Assessment & Plan:  Recurrent  sinus congestion  Headaches- tension and cervicogenic - work related pressure  Patient will stop Zyrtec which has been ineffective lately  Trial of Atarax 25 to 50 mg q h s  p r n  Abdullahi Po She may use known sedating antihistamine such as Allegra Claritin in the morning if needed      4  Vitamin D deficiency  -     Vitamin D 25 hydroxy; Future    5  Insomnia, unspecified type  -     hydrOXYzine HCL (ATARAX) 25 mg tablet; Take 1-2 tabs qhs  PRN : insomnia    6  Chronic fatigue  -     CBC and differential; Future  -     Comprehensive metabolic panel; Future  -     TSH, 3rd generation; Future    7  Need for lipid screening  -     Lipid Panel with Direct LDL reflex; Future    8  Thyroid nodule  Assessment & Plan:  Tiny nodule right lobe 2016   Mild thyromegaly on exam  Proceed with repeat thyroid ultrasound and labs including TSH      Orders:  -     US thyroid; Future; Expected date: 2022         BMI Counseling: Body mass index is 28 62 kg/m²  The BMI is above normal  Nutrition recommendations include encouraging healthy choices of fruits and vegetables, consuming healthier snacks, moderation in carbohydrate intake, reducing intake of saturated and trans fat and reducing intake of cholesterol  Exercise recommendations include exercising 3-5 times per week  No pharmacotherapy was ordered  Patient referred to PCP  Rationale for BMI follow-up plan is due to patient being overweight or obese           There are no Patient Instructions on file for this visit  No follow-ups on file  Discussed with the patient and all questioned fully answered  She will call me if any problems arise  M*Modal software was used to dictate this note  It may contain errors with dictating incorrect words/spelling  Please contact provider directly with any questions  Chief Complaint     Chief Complaint   Patient presents with    Physical Exam     Yearly    Insomnia     Nightly        History of Present Illness     Annual well exam   UTD with GYN and pending mammogram after 42 is pursed  She had a supracervical hysterectomy with one oophorectomy in March of 2021 - Atha Espinosa  No pelvic pain  Occ  rare spotting - GYN is aware  Left ovary is preserved  Works from home, very stressful job  Ongoing  pressure-  tension headaches, sinus HA, poor sleep  Melatonin PRN- works somewhat  Patient is a working mother of 3 children  No symptoms of chest pain, palpitations, shortness of breath or dizziness  No daily prescription medications aside from Lexapro 10 mg daily that works well  Patient has completed all 3 COVID-19 vaccinations  Review of Systems   Review of Systems   Constitutional: Negative  HENT: Negative  Eyes: Negative  Respiratory: Negative  Cardiovascular: Negative  Gastrointestinal: Negative  Endocrine: Negative  Genitourinary: Negative  Musculoskeletal: Negative  Skin: Negative  Allergic/Immunologic: Negative  Neurological: Negative  Hematological: Negative  Psychiatric/Behavioral: Positive for sleep disturbance          Under stress       Active Problem List     Patient Active Problem List   Diagnosis    Endometriosis determined by laparoscopy    Allergic rhinitis    Anxiety and depression    Elevated alkaline phosphatase level    Vitamin D deficiency    Gastroduodenitis    PONV (postoperative nausea and vomiting)    Thyroid nodule    Insomnia       Past Medical History:  Past Medical History:   Diagnosis Date    Anxiety     Chronic kidney disease     [id number]    Depression     Disease of thyroid gland     nodule    Endometriosis     Female infertility     iui with prior preg    GERD (gastroesophageal reflux disease)     Gestational diabetes     Kidney stone     PONV (postoperative nausea and vomiting)     gets "very sick"    Renal calculi     last assessed 10/20/14; US 10/2014 3mm and 4mm right, non obstructing    Varicella     childhood       Past Surgical History:  Past Surgical History:   Procedure Laterality Date    APPENDECTOMY       SECTION      CYSTOSCOPY N/A 3/1/2021    Procedure: CYSTOSCOPY;  Surgeon: Anastasiya Chadwick DO;  [facility];  Service: Gynecology    DIAGNOSTIC LAPAROSCOPY      multiple, last 10/2012, 10/2014    LAPAROSCOPIC OVARIAN CYSTECTOMY      LASER LAPAROSCOPY      NASAL SEPTUM SURGERY      [place]  DELIVERY ONLY N/A 2017    Procedure:  SECTION () REPEAT;  Surgeon: Quyen Alvarado DO;  [facility];  Service: Obstetrics    [place] LAP, SUPRACERVIAL HYSTERECTOMY, <250G N/A 3/1/2021    Procedure: L S H , BILATERAL SALPINGECTOMY, RIGHT OOPHORECTOMY;  Surgeon: Anastasiya Chadwick DO;  [facility];  Service: Gynecology    [place] Peter Rico TUBE W/ Bilateral 2017    Procedure: LIGATION/COAGULATION TUBAL;  Surgeon: Quyen Alvarado DO;  [facility];  Service: Obstetrics    TONSILLECTOMY      TUBAL LIGATION         Family History:  Family History   Problem Relation Age of Onset    Depression Mother     Hypertension Mother    Fleurette Lauryn / Jinnie Fender Mother     Anxiety disorder Mother         NOS    Heart defect Mother         aortic valve disorder    Endometriosis Mother     Cancer Sister         breast    Alcohol abuse Maternal Uncle     Cancer Maternal Grandmother         colon    Depression Maternal Grandmother     Dementia Maternal Grandmother     Vision loss Maternal Grandmother     Arthritis Paternal Grandmother     Hearing loss Paternal Grandmother     Heart disease Paternal Grandmother     Stroke Paternal Grandmother    Lemmie Gonzalez / Stillbirths Paternal Grandmother     Colon cancer Paternal [de-identified]     Hyperlipidemia Father         high cholesterol    Endometriosis Sister        Social History:  Social History     Socioeconomic History    Marital status: /Civil Union     Spouse name: Not on file    Number of children: Not on file    Years of education: Not on file    Highest education level: Not on file   Occupational History    Not on file   Tobacco Use    Smoking status: Former Smoker     Types: Cigarettes    Smokeless tobacco: Never Used    Tobacco comment: Current smoker-quit 2 weeks agao, when found pregnant, smoked half pay daily, as per [employer]   Vaping Use    Vaping Use: Never used   Substance and Sexual Activity    Alcohol use: No    Drug use: No    Sexual activity: Yes     Partners: Male     Birth control/protection: Female Sterilization   Other Topics Concern    Not on file   Social History Narrative    Not on file     Social Determinants of Health     Financial Resource Strain: Not on file   Food Insecurity: Not on file   Transportation Needs: Not on file   Physical Activity: Not on file   Stress: Not on file   Social Connections: Not on file   Intimate Partner Violence: Not on file   Housing Stability: Not on file         Objective     Vitals:    02/02/22 0927   BP: 110/60   BP Location: Left arm   Patient Position: Sitting   Cuff Size: Standard   Pulse: (!) 106   Resp: 18   Temp: 98 °F (36 7 °C)   TempSrc: Temporal   SpO2: 96%   Weight: 78 kg (172 lb)   Height: 5' 5" (1 651 m)       Wt Readings from Last 3 Encounters:   02/02/22 78 kg (172 lb)   01/31/22 77 6 kg (171 lb)   11/15/21 77 1 kg (170 lb)       Physical Exam  Vitals and nursing note reviewed     Constitutional:       General: She is not in acute distress  Appearance: Normal appearance  She is well-developed  She is not ill-appearing  HENT:      Head: Normocephalic and atraumatic  Eyes:      General: No scleral icterus  Conjunctiva/sclera: Conjunctivae normal    Neck:      Thyroid: Thyromegaly present  No thyroid mass or thyroid tenderness  Vascular: No carotid bruit  Cardiovascular:      Rate and Rhythm: Normal rate and regular rhythm  Heart sounds: Normal heart sounds  No murmur heard  Pulmonary:      Effort: Pulmonary effort is normal  No respiratory distress  Breath sounds: Normal breath sounds  No wheezing  Abdominal:      General: There is no distension or abdominal bruit  Palpations: Abdomen is soft  Tenderness: There is no abdominal tenderness  Hernia: No hernia is present  Musculoskeletal:         General: Normal range of motion  Cervical back: Normal range of motion and neck supple  No rigidity  Right lower leg: No edema  Left lower leg: No edema  Skin:     General: Skin is warm  Findings: No rash  Neurological:      General: No focal deficit present  Mental Status: She is alert and oriented to person, place, and time  Cranial Nerves: No cranial nerve deficit  Coordination: Coordination normal    Psychiatric:         Mood and Affect: Mood normal          Behavior: Behavior normal          Thought Content:  Thought content normal           Pertinent Laboratory/Diagnostic Studies:    Lab Results   Component Value Date    WBC 10 17 (H) 12/18/2020    HGB 13 1 02/23/2021    HCT 41 2 02/23/2021    MCV 82 12/18/2020     12/18/2020       No results found for: TSH    No results found for: CHOL  No results found for: TRIG  No results found for: HDL  No results found for: [address]  Lab Results   Component Value Date    HGBA1C 5 2 02/23/2021     Lab Results   Component Value Date    SODIUM 135 (L) 12/18/2020    K 3 5 12/18/2020    CL 99 (L) 12/18/2020    CO2 30 12/18/2020    ANIONGAP 10 08/15/2015    AGAP 6 12/18/2020    BUN 9 12/18/2020    CREATININE 0 61 12/18/2020    GLUC 103 12/18/2020    GLUF 100 (H) 06/08/2019    CALCIUM 8 6 12/18/2020    AST 13 12/18/2020    ALT 22 12/18/2020    ALKPHOS 108 12/18/2020    PROT 6 0 (L) 08/15/2015    TP 7 6 12/18/2020    BILITOT 0 30 08/15/2015    TBILI 0 60 12/18/2020    EGFR 115 12/18/2020       Orders Placed This Encounter   Procedures    US thyroid    CBC and differential    Comprehensive metabolic panel    Lipid Panel with Direct LDL reflex    TSH, 3rd generation    Vitamin D 25 hydroxy       ALLERGIES:  Allergies   Allergen Reactions    Cefprozil Shortness Of Breath    Pertussis Vaccine     Shellfish-Derived Products - Food Allergy Hives    Amoxicillin      Thrush,yeast infection       Current Medications     Current Outpatient Medications   Medication Sig Dispense Refill    APPLE CIDER VINEGAR PO Take 1 gum by mouth daily      cetirizine (ZyrTEC) 10 mg tablet Take 10 mg by mouth daily at bedtime       escitalopram (LEXAPRO) 10 mg tablet TAKE 1 TABLET BY MOUTH EVERY DAY 90 tablet 1    fluticasone (FLONASE) 50 mcg/act nasal spray 2 sprays into each nostril daily 1 Bottle 0    pseudoephedrine (SUDAFED) 120 MG 12 hr tablet Take 120 mg by mouth 2 (two) times a day PRN        hydrOXYzine HCL (ATARAX) 25 mg tablet Take 1-2 tabs qhs  PRN : insomnia 30 tablet 1     No current facility-administered medications for this visit         Medications Discontinued During This Encounter   Medication Reason    Melatonin 2 5 MG CAPS        Health Maintenance     Health Maintenance   Topic Date Due    Hepatitis C Screening  Never done    DTaP,Tdap,and Td Vaccines (6 - Tdap) 08/29/2018    BMI: Followup Plan  11/17/2022    BMI: Adult  02/02/2023    Annual Physical  02/02/2023    Cervical Cancer Screening  01/31/2027    HIV Screening  Completed    IPV Vaccine  Completed    Meningococcal ACWY Vaccine Completed    Influenza Vaccine  Completed    COVID-19 Vaccine  Completed    Pneumococcal Vaccine: Pediatrics (0 to 5 Years) and At-Risk Patients (6 to 59 Years)  Aged Out    HIB Vaccine  Aged Out    Hepatitis B Vaccine  Aged Out    Hepatitis A Vaccine  Aged Out    HPV Vaccine  Aged [employer] History   Administered Date(s) Administered    COVID-19 PFIZER VACCINE 0 3 ML IM 04/13/2021, 05/04/2021, 12/15/2021    DT (pediatric) 07/16/1984, 10/11/1993    DTaP 1982, 1982, 1982    DTaP 5 1982, 1982, 1982, 03/28/1983, 08/29/2008    INFLUENZA 10/10/2015, 10/08/2016, 10/17/2017    IPV 01/03/1983, 01/28/1983, 04/25/1983, 07/16/1984, 10/01/1987    Influenza Quadrivalent Preservative Free 3 years and older IM 10/10/2015, 10/08/2016    Influenza, Quadrivalent (nasal) 10/10/2015, 10/08/2016    Influenza, injectable, quadrivalent, preservative free 0 5 mL 10/29/2018, 10/08/2019, 10/24/2020, 11/15/2021    MMR 11/23/1983, 10/11/1993    Meningococcal MCV4P 08/02/2000, 08/29/2008    Meningococcal, Unknown Serogroups 08/02/2000, 08/29/2008    Mumps 08/22/1983    Rubella 11/23/1983, 09/21/1984    Tuberculin Skin Test-PPD Intradermal 02/06/2012       Belen Duncan MD

## 2022-02-05 PROBLEM — G47.00 INSOMNIA: Status: ACTIVE | Noted: 2022-02-05

## 2022-02-05 LAB
LAB AP GYN PRIMARY INTERPRETATION: NORMAL
Lab: NORMAL

## 2022-02-06 ENCOUNTER — HOSPITAL ENCOUNTER (OUTPATIENT)
Dept: ULTRASOUND IMAGING | Facility: HOSPITAL | Age: 42
Discharge: HOME | End: 2022-02-06
Payer: COMMERCIAL

## 2022-02-06 DIAGNOSIS — E04.1 THYROID NODULE: ICD-10-CM

## 2022-02-06 PROCEDURE — 76536 US EXAM OF HEAD AND NECK: CPT

## 2022-02-07 ENCOUNTER — APPOINTMENT (OUTPATIENT)
Dept: LAB | Facility: CLINIC | Age: 42
End: 2022-02-07
Payer: COMMERCIAL

## 2022-02-07 DIAGNOSIS — R53.82 CHRONIC FATIGUE: ICD-10-CM

## 2022-02-07 DIAGNOSIS — Z13.220 NEED FOR LIPID SCREENING: ICD-10-CM

## 2022-02-07 DIAGNOSIS — E55.9 VITAMIN D DEFICIENCY: ICD-10-CM

## 2022-02-07 LAB
25(OH)D3 SERPL-MCNC: 9.7 NG/ML (ref 30–100)
ALBUMIN SERPL BCP-MCNC: 3.8 G/DL (ref 3.5–5)
ALP SERPL-CCNC: 105 U/L (ref 46–116)
ALT SERPL W P-5'-P-CCNC: 23 U/L (ref 12–78)
ANION GAP SERPL CALCULATED.3IONS-SCNC: 6 MMOL/L (ref 4–13)
AST SERPL W P-5'-P-CCNC: 12 U/L (ref 5–45)
BASOPHILS # BLD AUTO: 0.05 THOUSANDS/ΜL (ref 0–0.1)
BASOPHILS NFR BLD AUTO: 1 % (ref 0–1)
BILIRUB SERPL-MCNC: 1.29 MG/DL (ref 0.2–1)
BUN SERPL-MCNC: 13 MG/DL (ref 5–25)
CALCIUM SERPL-MCNC: 9 MG/DL (ref 8.3–10.1)
CHLORIDE SERPL-SCNC: 105 MMOL/L (ref 100–108)
CHOLEST SERPL-MCNC: 238 MG/DL
CO2 SERPL-SCNC: 26 MMOL/L (ref 21–32)
CREAT SERPL-MCNC: 0.63 MG/DL (ref 0.6–1.3)
EOSINOPHIL # BLD AUTO: 0.13 THOUSAND/ΜL (ref 0–0.61)
EOSINOPHIL NFR BLD AUTO: 2 % (ref 0–6)
ERYTHROCYTE [DISTWIDTH] IN BLOOD BY AUTOMATED COUNT: 13.6 % (ref 11.6–15.1)
GFR SERPL CREATININE-BSD FRML MDRD: 113 ML/MIN/1.73SQ M
GLUCOSE P FAST SERPL-MCNC: 108 MG/DL (ref 65–99)
HCT VFR BLD AUTO: 39.8 % (ref 34.8–46.1)
HDLC SERPL-MCNC: 35 MG/DL
HGB BLD-MCNC: 13.2 G/DL (ref 11.5–15.4)
IMM GRANULOCYTES # BLD AUTO: 0.02 THOUSAND/UL (ref 0–0.2)
IMM GRANULOCYTES NFR BLD AUTO: 0 % (ref 0–2)
LDLC SERPL CALC-MCNC: 163 MG/DL (ref 0–100)
LYMPHOCYTES # BLD AUTO: 2.51 THOUSANDS/ΜL (ref 0.6–4.47)
LYMPHOCYTES NFR BLD AUTO: 29 % (ref 14–44)
MCH RBC QN AUTO: 26.9 PG (ref 26.8–34.3)
MCHC RBC AUTO-ENTMCNC: 33.2 G/DL (ref 31.4–37.4)
MCV RBC AUTO: 81 FL (ref 82–98)
MONOCYTES # BLD AUTO: 0.37 THOUSAND/ΜL (ref 0.17–1.22)
MONOCYTES NFR BLD AUTO: 4 % (ref 4–12)
NEUTROPHILS # BLD AUTO: 5.51 THOUSANDS/ΜL (ref 1.85–7.62)
NEUTS SEG NFR BLD AUTO: 64 % (ref 43–75)
NRBC BLD AUTO-RTO: 0 /100 WBCS
PLATELET # BLD AUTO: 259 THOUSANDS/UL (ref 149–390)
PMV BLD AUTO: 10.6 FL (ref 8.9–12.7)
POTASSIUM SERPL-SCNC: 3.8 MMOL/L (ref 3.5–5.3)
PROT SERPL-MCNC: 7 G/DL (ref 6.4–8.2)
RBC # BLD AUTO: 4.9 MILLION/UL (ref 3.81–5.12)
SODIUM SERPL-SCNC: 137 MMOL/L (ref 136–145)
TRIGL SERPL-MCNC: 200 MG/DL
TSH SERPL DL<=0.05 MIU/L-ACNC: 1.13 UIU/ML (ref 0.36–3.74)
WBC # BLD AUTO: 8.59 THOUSAND/UL (ref 4.31–10.16)

## 2022-02-07 PROCEDURE — 36415 COLL VENOUS BLD VENIPUNCTURE: CPT

## 2022-02-07 PROCEDURE — 80053 COMPREHEN METABOLIC PANEL: CPT

## 2022-02-07 PROCEDURE — 84443 ASSAY THYROID STIM HORMONE: CPT

## 2022-02-07 PROCEDURE — 85025 COMPLETE CBC W/AUTO DIFF WBC: CPT

## 2022-02-07 PROCEDURE — 80061 LIPID PANEL: CPT

## 2022-02-07 PROCEDURE — 82306 VITAMIN D 25 HYDROXY: CPT

## 2022-02-09 ENCOUNTER — TELEPHONE (OUTPATIENT)
Dept: FAMILY MEDICINE CLINIC | Facility: CLINIC | Age: 42
End: 2022-02-09

## 2022-02-09 DIAGNOSIS — E55.9 VITAMIN D DEFICIENCY: Primary | ICD-10-CM

## 2022-02-09 DIAGNOSIS — E78.5 DYSLIPIDEMIA: ICD-10-CM

## 2022-02-09 RX ORDER — ERGOCALCIFEROL 1.25 MG/1
50000 CAPSULE ORAL WEEKLY
Qty: 12 CAPSULE | Refills: 0 | Status: SHIPPED | OUTPATIENT
Start: 2022-02-09

## 2022-02-09 NOTE — TELEPHONE ENCOUNTER
Please contact patient  I will be sending her a detailed my chart message regarding results of blood work  Please ask kindly to review and contact me with any questions or concerns      Thank you

## 2022-02-10 DIAGNOSIS — G47.00 INSOMNIA, UNSPECIFIED TYPE: ICD-10-CM

## 2022-02-10 RX ORDER — HYDROXYZINE HYDROCHLORIDE 25 MG/1
TABLET, FILM COATED ORAL
Qty: 30 TABLET | Refills: 1 | Status: SHIPPED | OUTPATIENT
Start: 2022-02-10 | End: 2022-02-28

## 2022-02-28 DIAGNOSIS — G47.00 INSOMNIA, UNSPECIFIED TYPE: ICD-10-CM

## 2022-02-28 RX ORDER — HYDROXYZINE HYDROCHLORIDE 25 MG/1
TABLET, FILM COATED ORAL
Qty: 180 TABLET | Refills: 1 | Status: SHIPPED | OUTPATIENT
Start: 2022-02-28

## 2022-03-08 DIAGNOSIS — F32.A ANXIETY AND DEPRESSION: ICD-10-CM

## 2022-03-08 DIAGNOSIS — F41.9 ANXIETY AND DEPRESSION: ICD-10-CM

## 2022-03-08 RX ORDER — ESCITALOPRAM OXALATE 10 MG/1
TABLET ORAL
Qty: 30 TABLET | Refills: 5 | Status: SHIPPED | OUTPATIENT
Start: 2022-03-08

## 2022-04-19 ENCOUNTER — OFFICE VISIT (OUTPATIENT)
Dept: FAMILY MEDICINE CLINIC | Facility: CLINIC | Age: 42
End: 2022-04-19
Payer: COMMERCIAL

## 2022-04-19 VITALS
RESPIRATION RATE: 18 BRPM | TEMPERATURE: 97.6 F | BODY MASS INDEX: 28.32 KG/M2 | HEART RATE: 93 BPM | OXYGEN SATURATION: 98 % | SYSTOLIC BLOOD PRESSURE: 100 MMHG | HEIGHT: 65 IN | WEIGHT: 170 LBS | DIASTOLIC BLOOD PRESSURE: 60 MMHG

## 2022-04-19 DIAGNOSIS — L30.9 DERMATITIS: Primary | ICD-10-CM

## 2022-04-19 PROCEDURE — 3008F BODY MASS INDEX DOCD: CPT | Performed by: FAMILY MEDICINE

## 2022-04-19 PROCEDURE — 1036F TOBACCO NON-USER: CPT | Performed by: FAMILY MEDICINE

## 2022-04-19 PROCEDURE — 99213 OFFICE O/P EST LOW 20 MIN: CPT | Performed by: FAMILY MEDICINE

## 2022-04-19 RX ORDER — FEXOFENADINE HCL 180 MG/1
180 TABLET ORAL DAILY
COMMUNITY

## 2022-04-19 RX ORDER — DOXYCYCLINE HYCLATE 100 MG/1
100 CAPSULE ORAL
Qty: 28 CAPSULE | Refills: 0 | Status: SHIPPED | OUTPATIENT
Start: 2022-04-19 | End: 2022-05-03

## 2022-04-19 RX ORDER — CLOBETASOL PROPIONATE 0.5 MG/G
CREAM TOPICAL 3 TIMES DAILY
Qty: 45 G | Refills: 0 | Status: SHIPPED | OUTPATIENT
Start: 2022-04-19

## 2022-04-19 NOTE — PROGRESS NOTES
FAMILY PRACTICE OFFICE VISIT       NAME: Yessi Fink  AGE: 46 y o  SEX: female       : 1980        MRN: [id number]        Assessment and Plan     1  Dermatitis  -     clobetasol (TEMOVATE) 0 05 % cream; Apply topically 3 (three) times a day  -     doxycycline hyclate (VIBRAMYCIN) 100 mg capsule; Take 1 capsule (100 mg total) by mouth 2 (two) times daily after meals for 14 days    Patient presents for evaluation of pruritic rash x2 weeks  She suspects that she might have been bitten by the bug  She has been feeling generally well and is afebrile  I advised her to complete 2 weeks of doxycycline and start topical clobetasol  Patient will contact me in a few days if symptoms are not improving  There are no Patient Instructions on file for this visit  Return if symptoms worsen or fail to improve  Discussed with the patient and all questioned fully answered  She will call me if any problems arise  M*Modal software was used to dictate this note  It may contain errors with dictating incorrect words/spelling  Please contact provider directly with any questions  Chief Complaint     Chief Complaint   Patient presents with    Rash     Right outer ankle X2 weeks ago  Pt used Hydrocortisone Cream/Benedryl Cream helped better with itch  Started as a bug bite  Red raised area  History of Present Illness     Patient presents for evaluation of persistent pruritic rash right lateral distal ankle  She believes that she was bitten by the bug approximately 2 weeks ago  Area became very pruritic, rash has not been going away in spite of trial of Benadryl and 1% hydrocortisone cream over-the-counter  Patient denies symptoms of fever fatigue  She was not able to detect if she was bitten by the tick, spider or ant? No other rashes  Patient has been feeling well otherwise  Review of Systems   Review of Systems   Constitutional: Negative  HENT: Negative      Respiratory: Negative  Cardiovascular: Negative  Skin: Positive for rash  Neurological: Negative          Active Problem List     Patient Active Problem List   Diagnosis    Endometriosis determined by laparoscopy    Allergic rhinitis    Anxiety and depression    Elevated alkaline phosphatase level    Vitamin D deficiency    Gastroduodenitis    PONV (postoperative nausea and vomiting)    Thyroid nodule    Insomnia    Dyslipidemia       Past Medical History:  Past Medical History:   Diagnosis Date    Anxiety     Chronic kidney disease     [id number]    Depression     Disease of thyroid gland     nodule    Endometriosis     Female infertility     iui with prior preg    GERD (gastroesophageal reflux disease)     Gestational diabetes     Kidney stone     PONV (postoperative nausea and vomiting)     gets "very sick"    Renal calculi     last assessed 10/20/14; US 10/2014 3mm and 4mm right, non obstructing    Varicella     childhood       Past Surgical History:  Past Surgical History:   Procedure Laterality Date    APPENDECTOMY       SECTION      CYSTOSCOPY N/A 3/1/2021    Procedure: CYSTOSCOPY;  Surgeon: Harjit Dee DO;  [facility];  Service: Gynecology    DIAGNOSTIC LAPAROSCOPY      multiple, last 10/2012, 10/2014    LAPAROSCOPIC OVARIAN CYSTECTOMY      LASER LAPAROSCOPY      NASAL SEPTUM SURGERY      [place]  DELIVERY ONLY N/A 2017    Procedure:  SECTION () REPEAT;  Surgeon: Destiny Rodriguez DO;  [facility];  Service: Obstetrics    MS LAP, SUPRACERVIAL HYSTERECTOMY, <250G N/A 3/1/2021    Procedure: L S H , BILATERAL SALPINGECTOMY, RIGHT OOPHORECTOMY;  Surgeon: Harjit Dee DO;  [facility];  Service: Gynecology    MS Laneta Camps TUBE W/ Bilateral 2017    Procedure: LIGATION/COAGULATION TUBAL;  Surgeon: Destiny Rodriguez DO;  [facility];  Service: Obstetrics    TONSILLECTOMY      TUBAL LIGATION         Family History:  Family History   Problem Relation Age of Onset    Depression Mother     Hypertension Mother     [de-identified] / Stillbirths Mother     Anxiety disorder Mother         NOS    Heart defect Mother         aortic valve disorder    Endometriosis Mother     Cancer Sister         breast    Alcohol abuse Maternal Uncle     Cancer Maternal Grandmother         colon    Depression Maternal Grandmother     Dementia Maternal Grandmother     Vision loss Maternal Grandmother     Arthritis Paternal Grandmother     Hearing loss Paternal Grandmother     Heart disease Paternal Grandmother     Stroke Paternal Grandmother    [de-identified] / Stillbirths Paternal [de-identified]     Colon cancer Paternal [de-identified]     Hyperlipidemia Father         high cholesterol    Endometriosis Sister        Social History:  Social History     Socioeconomic History    Marital status: /Civil Union     Spouse name: Not on file    Number of children: Not on file    Years of education: Not on file    Highest education level: Not on file   Occupational History    Not on file   Tobacco Use    Smoking status: Former Smoker     Types: Cigarettes    Smokeless tobacco: Never Used    Tobacco comment: Current smoker-quit 2 weeks agao, when found pregnant, smoked half pay daily, as per [employer]   Vaping Use    Vaping Use: Never used   Substance and Sexual Activity    Alcohol use: No    Drug use: No    Sexual activity: Yes     Partners: Male     Birth control/protection: Female Sterilization   Other Topics Concern    Not on file   Social History Narrative    Not on file     Social Determinants of Health     Financial Resource Strain: Not on file   Food Insecurity: Not on file   Transportation Needs: Not on file   Physical Activity: Not on file   Stress: Not on file   Social Connections: Not on file   Intimate Partner Violence: Not on file   Housing Stability: Not on file         Objective Vitals:    04/19/22 1824   BP: 100/60   BP Location: Left arm   Patient Position: Sitting   Cuff Size: Standard   Pulse: 93   Resp: 18   Temp: 97 6 °F (36 4 °C)   TempSrc: Temporal   SpO2: 98%   Weight: 77 1 kg (170 lb)   Height: 5' 5" (1 651 m)       Wt Readings from Last 3 Encounters:   04/19/22 77 1 kg (170 lb)   02/02/22 78 kg (172 lb)   01/31/22 77 6 kg (171 lb)       Physical Exam  Vitals and nursing note reviewed  Constitutional:       Appearance: Normal appearance  Skin:     Comments: Right lateral distal ankle:  Area of raised erythematous rash, measuring  1 5" X 1", slightly raised, very pruritic, no central clearing, smooth surface  Neurological:      General: No focal deficit present  Mental Status: She is alert and oriented to person, place, and time  Psychiatric:         Mood and Affect: Mood normal          Behavior: Behavior normal          Thought Content:  Thought content normal           Pertinent Laboratory/Diagnostic Studies:    Lab Results   Component Value Date    WBC 8 59 02/07/2022    HGB 13 2 02/07/2022    HCT 39 8 02/07/2022    MCV 81 (L) 02/07/2022     02/07/2022       No results found for: TSH    No results found for: CHOL  Lab Results   Component Value Date    TRIG 200 (H) 02/07/2022     Lab Results   Component Value Date    HDL 35 (L) 02/07/2022     Lab Results   Component Value Date    LDLCALC 163 (H) 02/07/2022     Lab Results   Component Value Date    HGBA1C 5 2 02/23/2021     Lab Results   Component Value Date    SODIUM 137 02/07/2022    K 3 8 02/07/2022     02/07/2022    CO2 26 02/07/2022    ANIONGAP 10 08/15/2015    AGAP 6 02/07/2022    BUN 13 02/07/2022    CREATININE 0 63 02/07/2022    GLUC 103 12/18/2020    GLUF 108 (H) 02/07/2022    CALCIUM 9 0 02/07/2022    AST 12 02/07/2022    ALT 23 02/07/2022    ALKPHOS 105 02/07/2022    PROT 6 0 (L) 08/15/2015    TP 7 0 02/07/2022    BILITOT 0 30 08/15/2015    TBILI 1 29 (H) 02/07/2022    EGFR 113 02/07/2022 No orders of the defined types were placed in this encounter  ALLERGIES:  Allergies   Allergen Reactions    Cefprozil Shortness Of Breath    Pertussis Vaccine     Shellfish-Derived Products - Food Allergy Hives    Amoxicillin      Thrush,yeast infection       Current Medications     Current Outpatient Medications   Medication Sig Dispense Refill    APPLE CIDER VINEGAR PO Take 1 gum by mouth daily      ergocalciferol (VITAMIN D2) 50,000 units Take 1 capsule (50,000 Units total) by mouth once a week 12 capsule 0    escitalopram (LEXAPRO) 10 mg tablet TAKE 1 TABLET BY MOUTH EVERY DAY 30 tablet 5    fexofenadine (ALLEGRA) 180 MG tablet Take 180 mg by mouth daily      fluticasone (FLONASE) 50 mcg/act nasal spray 2 sprays into each nostril daily 1 Bottle 0    hydrOXYzine HCL (ATARAX) 25 mg tablet TAKE 1 TO 2 TABLETS BY MOUTH AT BEDTIME AS NEEDED FOR INSOMNIA 180 tablet 1    pseudoephedrine (SUDAFED) 120 MG 12 hr tablet Take 120 mg by mouth 2 (two) times a day PRN        clobetasol (TEMOVATE) 0 05 % cream Apply topically 3 (three) times a day 45 g 0    doxycycline hyclate (VIBRAMYCIN) 100 mg capsule Take 1 capsule (100 mg total) by mouth 2 (two) times daily after meals for 14 days 28 capsule 0     No current facility-administered medications for this visit         Medications Discontinued During This Encounter   Medication Reason    cetirizine (ZyrTEC) 10 mg tablet Alternate therapy       Health Maintenance     Health Maintenance   Topic Date Due    Hepatitis C Screening  Never done    DTaP,Tdap,and Td Vaccines (6 - Tdap) 08/29/2018    Annual Physical  02/02/2023    BMI: Followup Plan  02/05/2023    BMI: Adult  04/19/2023    Cervical Cancer Screening  01/31/2027    HIV Screening  Completed    IPV Vaccine  Completed    Meningococcal ACWY Vaccine  Completed    Influenza Vaccine  Completed    COVID-19 Vaccine  Completed    Pneumococcal Vaccine: Pediatrics (0 to 5 Years) and At-Risk Patients (6 to 59 Years)  Aged Out    HIB Vaccine  Aged Out    Hepatitis B Vaccine  Aged Out    Hepatitis A Vaccine  Aged Out    HPV Vaccine  Aged [employer] History   Administered Date(s) Administered    COVID-19 PFIZER VACCINE 0 3 ML IM 04/13/2021, 05/04/2021, 12/15/2021    DT (pediatric) 07/16/1984, 10/11/1993    DTaP 1982, 1982, 1982    DTaP 5 1982, 1982, 1982, 03/28/1983, 08/29/2008    INFLUENZA 10/10/2015, 10/08/2016, 10/17/2017    IPV 01/03/1983, 01/28/1983, 04/25/1983, 07/16/1984, 10/01/1987    Influenza Quadrivalent Preservative Free 3 years and older IM 10/10/2015, 10/08/2016    Influenza, Quadrivalent (nasal) 10/10/2015, 10/08/2016    Influenza, injectable, quadrivalent, preservative free 0 5 mL 10/29/2018, 10/08/2019, 10/24/2020, 11/15/2021    MMR 11/23/1983, 10/11/1993    Meningococcal MCV4P 08/02/2000, 08/29/2008    Meningococcal, Unknown Serogroups 08/02/2000, 08/29/2008    Mumps 08/22/1983    Rubella 11/23/1983, 09/21/1984    Tuberculin Skin Test-PPD Intradermal 02/06/2012       Maegan Monte MD

## 2022-08-01 ENCOUNTER — HOSPITAL ENCOUNTER (OUTPATIENT)
Dept: MAMMOGRAPHY | Facility: HOSPITAL | Age: 42
Discharge: HOME | End: 2022-08-01
Payer: COMMERCIAL

## 2022-08-01 VITALS — HEIGHT: 65 IN | WEIGHT: 169.97 LBS | BODY MASS INDEX: 28.32 KG/M2

## 2022-08-01 DIAGNOSIS — Z12.31 BREAST CANCER SCREENING BY MAMMOGRAM: ICD-10-CM

## 2022-08-01 PROCEDURE — 77067 SCR MAMMO BI INCL CAD: CPT

## 2022-08-01 PROCEDURE — 77063 BREAST TOMOSYNTHESIS BI: CPT

## 2022-09-11 DIAGNOSIS — F41.9 ANXIETY AND DEPRESSION: ICD-10-CM

## 2022-09-11 DIAGNOSIS — F32.A ANXIETY AND DEPRESSION: ICD-10-CM

## 2022-09-11 RX ORDER — ESCITALOPRAM OXALATE 10 MG/1
TABLET ORAL
Qty: 30 TABLET | Refills: 5 | Status: SHIPPED | OUTPATIENT
Start: 2022-09-11 | End: 2022-10-08

## 2022-10-08 DIAGNOSIS — F41.9 ANXIETY AND DEPRESSION: ICD-10-CM

## 2022-10-08 DIAGNOSIS — F32.A ANXIETY AND DEPRESSION: ICD-10-CM

## 2022-10-08 RX ORDER — ESCITALOPRAM OXALATE 10 MG/1
TABLET ORAL
Qty: 90 TABLET | Refills: 2 | Status: SHIPPED | OUTPATIENT
Start: 2022-10-08

## 2022-11-02 ENCOUNTER — HOSPITAL ENCOUNTER (EMERGENCY)
Facility: HOSPITAL | Age: 42
Discharge: HOME | End: 2022-11-02
Attending: EMERGENCY MEDICINE

## 2022-11-02 VITALS
OXYGEN SATURATION: 100 % | BODY MASS INDEX: 28.32 KG/M2 | WEIGHT: 170 LBS | DIASTOLIC BLOOD PRESSURE: 60 MMHG | HEIGHT: 65 IN | HEART RATE: 69 BPM | SYSTOLIC BLOOD PRESSURE: 122 MMHG | RESPIRATION RATE: 18 BRPM | TEMPERATURE: 98 F

## 2022-11-02 DIAGNOSIS — R11.2 NAUSEA & VOMITING: Primary | ICD-10-CM

## 2022-11-02 LAB
ANION GAP SERPL CALCULATED.3IONS-SCNC: 4 MMOL/L (ref 4–13)
BASOPHILS # BLD AUTO: 0.02 THOUSANDS/ÂΜL (ref 0–0.1)
BASOPHILS NFR BLD AUTO: 0 % (ref 0–1)
BUN SERPL-MCNC: 11 MG/DL (ref 5–25)
CALCIUM SERPL-MCNC: 8.6 MG/DL (ref 8.4–10.2)
CHLORIDE SERPL-SCNC: 107 MMOL/L (ref 96–108)
CO2 SERPL-SCNC: 29 MMOL/L (ref 21–32)
CREAT SERPL-MCNC: 0.67 MG/DL (ref 0.6–1.3)
EOSINOPHIL # BLD AUTO: 0.03 THOUSAND/ÂΜL (ref 0–0.61)
EOSINOPHIL NFR BLD AUTO: 0 % (ref 0–6)
ERYTHROCYTE [DISTWIDTH] IN BLOOD BY AUTOMATED COUNT: 13.4 % (ref 11.6–15.1)
GFR SERPL CREATININE-BSD FRML MDRD: 110 ML/MIN/1.73SQ M
GLUCOSE SERPL-MCNC: 102 MG/DL (ref 65–140)
HCT VFR BLD AUTO: 38.4 % (ref 34.8–46.1)
HGB BLD-MCNC: 12.9 G/DL (ref 11.5–15.4)
IMM GRANULOCYTES # BLD AUTO: 0.02 THOUSAND/UL (ref 0–0.2)
IMM GRANULOCYTES NFR BLD AUTO: 0 % (ref 0–2)
LYMPHOCYTES # BLD AUTO: 1.86 THOUSANDS/ÂΜL (ref 0.6–4.47)
LYMPHOCYTES NFR BLD AUTO: 24 % (ref 14–44)
MCH RBC QN AUTO: 28.4 PG (ref 26.8–34.3)
MCHC RBC AUTO-ENTMCNC: 33.6 G/DL (ref 31.4–37.4)
MCV RBC AUTO: 84 FL (ref 82–98)
MONOCYTES # BLD AUTO: 0.42 THOUSAND/ÂΜL (ref 0.17–1.22)
MONOCYTES NFR BLD AUTO: 5 % (ref 4–12)
NEUTROPHILS # BLD AUTO: 5.37 THOUSANDS/ÂΜL (ref 1.85–7.62)
NEUTS SEG NFR BLD AUTO: 71 % (ref 43–75)
NRBC BLD AUTO-RTO: 0 /100 WBCS
PLATELET # BLD AUTO: 242 THOUSANDS/UL (ref 149–390)
PMV BLD AUTO: 10.4 FL (ref 8.9–12.7)
POTASSIUM SERPL-SCNC: 3.8 MMOL/L (ref 3.5–5.3)
RBC # BLD AUTO: 4.55 MILLION/UL (ref 3.81–5.12)
SODIUM SERPL-SCNC: 140 MMOL/L (ref 135–147)
WBC # BLD AUTO: 7.72 THOUSAND/UL (ref 4.31–10.16)

## 2022-11-02 RX ORDER — METOCLOPRAMIDE 10 MG/1
10 TABLET ORAL EVERY 6 HOURS
Qty: 12 TABLET | Refills: 0 | Status: SHIPPED | OUTPATIENT
Start: 2022-11-02 | End: 2022-11-05

## 2022-11-02 RX ORDER — ACETAMINOPHEN 325 MG/1
650 TABLET ORAL ONCE
Status: COMPLETED | OUTPATIENT
Start: 2022-11-02 | End: 2022-11-02

## 2022-11-02 RX ORDER — METOCLOPRAMIDE HYDROCHLORIDE 5 MG/ML
10 INJECTION INTRAMUSCULAR; INTRAVENOUS ONCE
Status: COMPLETED | OUTPATIENT
Start: 2022-11-02 | End: 2022-11-02

## 2022-11-02 RX ORDER — DIPHENHYDRAMINE HYDROCHLORIDE 50 MG/ML
25 INJECTION INTRAMUSCULAR; INTRAVENOUS ONCE
Status: COMPLETED | OUTPATIENT
Start: 2022-11-02 | End: 2022-11-02

## 2022-11-02 RX ADMIN — METOCLOPRAMIDE 10 MG: 5 INJECTION, SOLUTION INTRAMUSCULAR; INTRAVENOUS at 13:44

## 2022-11-02 RX ADMIN — SODIUM CHLORIDE 1000 ML: 0.9 INJECTION, SOLUTION INTRAVENOUS at 13:43

## 2022-11-02 RX ADMIN — ACETAMINOPHEN 650 MG: 325 TABLET, FILM COATED ORAL at 15:20

## 2022-11-02 RX ADMIN — DIPHENHYDRAMINE HYDROCHLORIDE 25 MG: 50 INJECTION, SOLUTION INTRAMUSCULAR; INTRAVENOUS at 13:43

## 2022-11-02 NOTE — ED PROVIDER NOTES
History  Chief Complaint   Patient presents with   • Vomiting     Started on Monday night, unable to keep anything  Took zofran at home - no relief  Denies fever, and any other symptoms  44yo F w/ hx of hysterectomy, appendectomy, and endometriosis presents nausea, vomiting, and headache  She states two days hx of acute onset nausea and vomiting that is exacerbated by fluid or food consumption  Denies previous hx of the same  Has been taking zofran and going to sleep, but states no relief w/ zofran  Episodes of nausea and vomiting do not wake her from her sleep  New onset headache that started after vomiting and patient states his because she has been dehydrated with low fluid intake  Takes Lexapro  History of anxiety  Denies alcohol, tobacco, recreational drug use  History provided by:  Patient and parent   used: No        Prior to Admission Medications   Prescriptions Last Dose Informant Patient Reported? Taking?    APPLE CIDER VINEGAR PO  Self Yes No   Sig: Take 1 gum by mouth daily   clobetasol (TEMOVATE) 0 05 % cream   No No   Sig: Apply topically 3 (three) times a day   ergocalciferol (VITAMIN D2) 50,000 units   No No   Sig: Take 1 capsule (50,000 Units total) by mouth once a week   escitalopram (LEXAPRO) 10 mg tablet   No No   Sig: TAKE 1 TABLET BY MOUTH EVERY DAY   fexofenadine (ALLEGRA) 180 MG tablet  Self Yes No   Sig: Take 180 mg by mouth daily   fluticasone (FLONASE) 50 mcg/act nasal spray   No No   Si sprays into each nostril daily   hydrOXYzine HCL (ATARAX) 25 mg tablet   No No   Sig: TAKE 1 TO 2 TABLETS BY MOUTH AT BEDTIME AS NEEDED FOR INSOMNIA   pseudoephedrine (SUDAFED) 120 MG 12 hr tablet  Self Yes No   Sig: Take 120 mg by mouth 2 (two) times a day PRN        Facility-Administered Medications: None       Past Medical History:   Diagnosis Date   • Anxiety    • BRCA1 negative    • BRCA2 negative    • Chronic kidney disease        • Depression    • Disease of thyroid gland     nodule   • Endometriosis    • Female infertility     iui with prior preg   • GERD (gastroesophageal reflux disease)    • Gestational diabetes    • Kidney stone    • PONV (postoperative nausea and vomiting)     gets "very sick"   • Renal calculi     last assessed 10/20/14; US 10/2014 3mm and 4mm right, non obstructing   • Varicella     childhood       Past Surgical History:   Procedure Laterality Date   • APPENDECTOMY     •  SECTION     • CYSTOSCOPY N/A 3/1/2021    Procedure: CYSTOSCOPY;  Surgeon: Clarissa Perez DO;  [facility];  Service: Gynecology   • DIAGNOSTIC LAPAROSCOPY      multiple, last 10/2012, 10/2014   • LAPAROSCOPIC OVARIAN CYSTECTOMY     • LASER LAPAROSCOPY     • NASAL SEPTUM SURGERY     • [place]  DELIVERY ONLY N/A 2017    Procedure:  SECTION () REPEAT;  Surgeon: Grayson Mcburney, DO;  [facility];  Service: Obstetrics   • [place] LAP, Janneth Firenza 442, <250G N/A 3/1/2021    Procedure: L S H , BILATERAL SALPINGECTOMY, RIGHT OOPHORECTOMY;  Surgeon: Clarissa Perez DO;  [facility];  Service: Gynecology   • [place] Álvaro Billings TUBE W/ Bilateral 2017    Procedure: LIGATION/COAGULATION TUBAL;  Surgeon: Grayson Mcburney, DO;  [facility];  Service: Obstetrics   • TONSILLECTOMY     • TUBAL LIGATION         Family History   Problem Relation Age of Onset   • Depression Mother    • Hypertension Mother    • Miscarriages / [place] Mother    • Anxiety disorder Mother         NOS   • Heart defect Mother         aortic valve disorder   • Endometriosis Mother    • Hyperlipidemia Father         high cholesterol   • Breast cancer Sister 29   • Cancer Sister         breast   • Colon cancer Maternal Grandmother 76   • Cancer Maternal Grandmother         colon   • Depression Maternal Grandmother    • Dementia Maternal Grandmother    • Vision loss Maternal Grandmother    • Arthritis Paternal Grandmother    • Hearing loss Paternal Grandmother    • Heart disease Paternal Grandmother    • Stroke Paternal Grandmother    • Miscarriages / Stillbirths Paternal Grandmother    • Colon cancer Paternal Grandmother 67   • Alcohol abuse Maternal Uncle    • Breast cancer Maternal Aunt 58   • No Known Problems Maternal Aunt    • No Known Problems Maternal Aunt    • No Known Problems Paternal Aunt      I have reviewed and agree with the history as documented  E-Cigarette/Vaping   • E-Cigarette Use Never User      E-Cigarette/Vaping Substances   • Nicotine No    • THC No    • CBD No    • Flavoring No    • Other No    • Unknown No      Social History     Tobacco Use   • Smoking status: Former Smoker     Types: Cigarettes   • Smokeless tobacco: Never Used   • Tobacco comment: Current smoker-quit 2 weeks agao, when found pregnant, smoked half pay daily, as per [employer]   Vaping Use   • Vaping Use: Never used   Substance Use Topics   • Alcohol use: No   • Drug use: No        Review of Systems   Constitutional: Positive for appetite change  Negative for chills, diaphoresis, fatigue and fever  HENT: Negative for hearing loss and tinnitus  Eyes: Negative for pain and visual disturbance  Respiratory: Negative for cough, choking, chest tightness and shortness of breath  Cardiovascular: Negative for chest pain, palpitations and leg swelling  Gastrointestinal: Negative for abdominal pain, constipation, diarrhea, nausea and vomiting  Genitourinary: Negative for dysuria and urgency  Musculoskeletal: Negative for back pain and neck stiffness  Neurological: Positive for headaches  Negative for dizziness, speech difficulty, weakness, light-headedness and numbness  Psychiatric/Behavioral: Negative for confusion         Physical Exam  ED Triage Vitals   Temperature Pulse Respirations Blood Pressure SpO2   11/02/22 1236 11/02/22 1236 11/02/22 1236 11/02/22 1236 11/02/22 1236   98 7 °F (37 1 °C) 87 18 121/75 98 % Temp Source Heart Rate Source Patient Position - Orthostatic VS BP Location FiO2 (%)   11/02/22 1236 11/02/22 1505 11/02/22 1236 11/02/22 1236 --   Oral Monitor Sitting Left arm       Pain Score       11/02/22 1505       6             Orthostatic Vital Signs  Vitals:    11/02/22 1236 11/02/22 1505   BP: 121/75 122/60   Pulse: 87 69   Patient Position - Orthostatic VS: Sitting Sitting       Physical Exam  Vitals and nursing note reviewed  Constitutional:       General: She is not in acute distress  Appearance: Normal appearance  She is normal weight  She is not ill-appearing or toxic-appearing  HENT:      Head: Normocephalic and atraumatic  Right Ear: Tympanic membrane, ear canal and external ear normal  There is no impacted cerumen  Left Ear: Tympanic membrane, ear canal and external ear normal  There is no impacted cerumen  Nose: Nose normal  No congestion or rhinorrhea  Mouth/Throat:      Mouth: Mucous membranes are moist       Pharynx: Oropharynx is clear  No oropharyngeal exudate or posterior oropharyngeal erythema  Eyes:      General: No scleral icterus  Right eye: No discharge  Left eye: No discharge  Extraocular Movements: Extraocular movements intact  Conjunctiva/sclera: Conjunctivae normal       Pupils: Pupils are equal, round, and reactive to light  Cardiovascular:      Rate and Rhythm: Normal rate and regular rhythm  Pulses: Normal pulses  Heart sounds: Normal heart sounds  Pulmonary:      Effort: Pulmonary effort is normal  No respiratory distress  Breath sounds: Normal breath sounds  No stridor  No wheezing, rhonchi or rales  Chest:      Chest wall: No tenderness  Abdominal:      General: There is no distension  Palpations: Abdomen is soft  There is no mass  Tenderness: There is no abdominal tenderness  There is no right CVA tenderness, left CVA tenderness, guarding or rebound     Musculoskeletal:         General: No swelling, tenderness, deformity or signs of injury  Normal range of motion  Cervical back: Normal range of motion and neck supple  No rigidity or tenderness  Right lower leg: No edema  Left lower leg: No edema  Lymphadenopathy:      Cervical: No cervical adenopathy  Skin:     General: Skin is warm  Capillary Refill: Capillary refill takes less than 2 seconds  Coloration: Skin is not jaundiced or pale  Findings: No bruising, erythema, lesion or rash  Neurological:      General: No focal deficit present  Mental Status: She is alert and oriented to person, place, and time  Mental status is at baseline  Cranial Nerves: No cranial nerve deficit  Sensory: No sensory deficit  Motor: No weakness     Psychiatric:         Mood and Affect: Mood normal          Behavior: Behavior normal          ED Medications  Medications   sodium chloride 0 9 % bolus 1,000 mL (0 mL Intravenous Stopped 11/2/22 1615)   metoclopramide (REGLAN) injection 10 mg (10 mg Intravenous Given 11/2/22 1344)   diphenhydrAMINE (BENADRYL) injection 25 mg (25 mg Intravenous Given 11/2/22 1343)   acetaminophen (TYLENOL) tablet 650 mg (650 mg Oral Given 11/2/22 1520)       Diagnostic Studies  Results Reviewed     Procedure Component Value Units Date/Time    Basic metabolic panel [[id number]] Collected: 11/02/22 1445    Lab Status: Final result Specimen: Blood from Arm, Right Updated: 11/02/22 1511     Sodium 140 mmol/L      Potassium 3 8 mmol/L      Chloride 107 mmol/L      CO2 29 mmol/L      ANION GAP 4 mmol/L      BUN 11 mg/dL      Creatinine 0 67 mg/dL      Glucose 102 mg/dL      Calcium 8 6 mg/dL      eGFR 110 ml/min/1 73sq m     Narrative:      [place] guidelines for Chronic Kidney Disease (CKD):   •  Stage 1 with normal or high GFR (GFR > 90 mL/min/1 73 square meters)  •  Stage 2 Mild CKD (GFR = 60-89 mL/min/1 73 square meters)  •  Stage 3A Moderate CKD (GFR = 45-59 mL/min/1 73 square meters)  •  Stage 3B Moderate CKD (GFR = 30-44 mL/min/1 73 square meters)  •  Stage 4 Severe CKD (GFR = 15-29 mL/min/1 73 square meters)  •  Stage 5 End Stage CKD (GFR <15 mL/min/1 73 square meters)  Note: GFR calculation is accurate only with a steady state creatinine    CBC and differential [[id number]] Collected: 11/02/22 1445    Lab Status: Final result Specimen: Blood from Arm, Right Updated: 11/02/22 1459     WBC 7 72 Thousand/uL      RBC 4 55 Million/uL      Hemoglobin 12 9 g/dL      Hematocrit 38 4 %      MCV 84 fL      MCH 28 4 pg      MCHC 33 6 g/dL      RDW 13 4 %      MPV 10 4 fL      Platelets 258 Thousands/uL      nRBC 0 /100 WBCs      Neutrophils Relative 71 %      Immat GRANS % 0 %      Lymphocytes Relative 24 %      Monocytes Relative 5 %      Eosinophils Relative 0 %      Basophils Relative 0 %      Neutrophils Absolute 5 37 Thousands/µL      Immature Grans Absolute 0 02 Thousand/uL      Lymphocytes Absolute 1 86 Thousands/µL      Monocytes Absolute 0 42 Thousand/µL      Eosinophils Absolute 0 03 Thousand/µL      Basophils Absolute 0 02 Thousands/µL                  No orders to display         Procedures  Procedures      ED Course                                       MDM  Number of Diagnoses or Management Options  Nausea & vomiting  Diagnosis management comments: 45-year-old female presents with nausea, vomiting, headache  Vitals within normal limits, afebrile, no acute distress, nontoxic appearing, non-ill appearing, neurologically intact, cardiovascular exam within normal limits, lung sounds clear, abdomen soft nontender  Differential includes but not limited to tension headache, cluster headache, migraine, dehydration, viral illness, food poisoning  Patient given fluids, Reglan, diphenhydramine, Tylenol for symptoms and states improvement    Patient states that baseline and wishes to go home, shared decision making was made and patient was given strict return precautions  Which advised to follow-up primary care physician  Patient given script for Reglan, and advised to continue Tylenol, diphenhydramine, fluids  Amount and/or Complexity of Data Reviewed  Clinical lab tests: ordered and reviewed  Decide to obtain previous medical records or to obtain history from someone other than the patient: yes  Obtain history from someone other than the patient: yes        Disposition  Final diagnoses:   Nausea & vomiting     Time reflects when diagnosis was documented in both MDM as applicable and the Disposition within this note     Time User Action Codes Description Comment    11/2/2022  3:45 PM Julian Howe Add [R11 2] Nausea & vomiting       ED Disposition     ED Disposition   Discharge    Condition   Stable    Date/Time   Wed Nov 2, 2022  3:45 PM    Comment   Malou LONDON Sheets discharge to home/self care                 Follow-up Information    None         Discharge Medication List as of 11/2/2022  4:04 PM      START taking these medications    Details   metoclopramide (Reglan) 10 mg tablet Take 1 tablet (10 mg total) by mouth every 6 (six) hours for 3 days, Starting Wed 11/2/2022, Until Sat 11/5/2022, Normal         CONTINUE these medications which have NOT CHANGED    Details   APPLE CIDER VINEGAR PO Take 1 gum by mouth daily, Historical Med      clobetasol (TEMOVATE) 0 05 % cream Apply topically 3 (three) times a day, Starting Tue 4/19/2022, Normal      ergocalciferol (VITAMIN D2) 50,000 units Take 1 capsule (50,000 Units total) by mouth once a week, Starting Wed 2/9/2022, Normal      escitalopram (LEXAPRO) 10 mg tablet TAKE 1 TABLET BY MOUTH EVERY DAY, Normal      fexofenadine (ALLEGRA) 180 MG tablet Take 180 mg by mouth daily, Historical Med      fluticasone (FLONASE) 50 mcg/act nasal spray 2 sprays into each nostril daily, Starting Mon 10/29/2018, Normal      hydrOXYzine HCL (ATARAX) 25 mg tablet TAKE 1 TO 2 TABLETS BY MOUTH AT BEDTIME AS NEEDED FOR INSOMNIA, Normal pseudoephedrine (SUDAFED) 120 MG 12 hr tablet Take 120 mg by mouth 2 (two) times a day PRN  , Historical Med           No discharge procedures on file  PDMP Review     None           ED Provider  Attending physically available and evaluated [address]   I managed the patient along with the ED Attending      Electronically Signed by         Lala Baumgarten, MD  11/02/22 8059

## 2022-11-02 NOTE — Clinical Note
Darnell Ortiz was seen and treated in our emergency department on 11/2/2022  No restrictions            Diagnosis:     Lian Host  may return to work on return date  She may return on this date: 11/03/2022         If you have any questions or concerns, please don't hesitate to call        Meredith Toro MD    ______________________________           _______________          _______________  Hospital Representative                              Date                                Time

## 2022-11-02 NOTE — DISCHARGE INSTRUCTIONS
-Please follow up w/ your primary care physician concerning nausea and vomiting   -you have been prescribed a script for Reglan which will help with nausea and headaches   -you may take Tylenol over-the-counter for headaches as needed  Please return to the emergency department if you begin to develop fevers, chills, severe headaches unrelieved by pain medications, continued nausea and vomiting, vision changes, focal neurological deficits, facial droop, slurred speech, severe neck stiffness

## 2022-11-07 NOTE — ED ATTENDING ATTESTATION
11/2/2022  IBro MD, saw and evaluated the patient  I have discussed the patient with the resident/non-physician practitioner and agree with the resident's/non-physician practitioner's findings, Plan of Care, and MDM as documented in the resident's/non-physician practitioner's note, except where noted  All available labs and Radiology studies were reviewed  I was present for key portions of any procedure(s) performed by the resident/non-physician practitioner and I was immediately available to provide assistance  At this point I agree with the current assessment done in the Emergency Department    I have conducted an independent evaluation of this patient a history and physical is as follows: see  h and p above- agree with er resident tx plan/ dispo    ED Course  ED Course as of 11/07/22 1543   Wed Nov 02, 2022   1607 Er md note- pt- re-evaluated- feels much  improved-- tolerated po challenge -- all d/c instructions given to pt and mother as well as reaosns when to retur n to  the er - they verb lize understanding         Critical Care Time  Procedures

## 2022-12-26 ENCOUNTER — OFFICE VISIT (OUTPATIENT)
Dept: URGENT CARE | Facility: CLINIC | Age: 42
End: 2022-12-26

## 2022-12-26 VITALS
OXYGEN SATURATION: 97 % | HEIGHT: 66 IN | WEIGHT: 167 LBS | HEART RATE: 96 BPM | DIASTOLIC BLOOD PRESSURE: 72 MMHG | SYSTOLIC BLOOD PRESSURE: 113 MMHG | RESPIRATION RATE: 18 BRPM | BODY MASS INDEX: 26.84 KG/M2 | TEMPERATURE: 98 F

## 2022-12-26 DIAGNOSIS — Z20.822 ENCOUNTER FOR LABORATORY TESTING FOR COVID-19 VIRUS: ICD-10-CM

## 2022-12-26 DIAGNOSIS — J02.9 PHARYNGITIS, UNSPECIFIED ETIOLOGY: Primary | ICD-10-CM

## 2022-12-26 LAB — S PYO AG THROAT QL: NEGATIVE

## 2022-12-26 RX ORDER — AZITHROMYCIN 250 MG/1
TABLET, FILM COATED ORAL
Qty: 6 TABLET | Refills: 0 | Status: SHIPPED | OUTPATIENT
Start: 2022-12-26 | End: 2022-12-30

## 2022-12-26 NOTE — PROGRESS NOTES
[address] [facility]        NAME: Parul Hilario is a 38 y o  female  : 1980    MRN: [id number]  DATE: 2022  TIME: 4:16 PM    /72   Pulse 96   Temp 98 °F (36 7 °C) (Tympanic)   Resp 18   Ht 5' 6" (1 676 m)   Wt 75 8 kg (167 lb)   LMP 2019 (Exact Date)   SpO2 97%   BMI 26 95 kg/m²     Assessment and Plan   Pharyngitis, unspecified etiology [J02 9]  1  Pharyngitis, unspecified etiology  POCT rapid strepA    Cov/Flu-Collected at [facility] or [facility]    Throat culture    azithromycin (ZITHROMAX) 250 mg tablet      2  Encounter for laboratory testing for COVID-19 virus  azithromycin (ZITHROMAX) 250 mg tablet            Patient Instructions       Follow up with PCP in 3-5 days  Proceed to  ER if symptoms worsen  Chief Complaint     Chief Complaint   Patient presents with   • Sore Throat     Started Wed   • Headache     Headache, sinus draining, cough started yesterday  History of Present Illness       Pt with sore throat and sinus pressure for several days, child strep +      Review of Systems   Review of Systems   Constitutional: Negative  HENT: Positive for congestion, sinus pressure and sore throat  Eyes: Negative  Respiratory: Negative  Cardiovascular: Negative  Gastrointestinal: Negative  Endocrine: Negative  Genitourinary: Negative  Musculoskeletal: Negative  Allergic/Immunologic: Negative  Neurological: Negative  Hematological: Negative  Psychiatric/Behavioral: Negative  All other systems reviewed and are negative          Current Medications       Current Outpatient Medications:   •  APPLE CIDER VINEGAR PO, Take 1 gum by mouth daily, Disp: , Rfl:   •  azithromycin (ZITHROMAX) 250 mg tablet, Take 2 tablets today then 1 tablet daily x 4 days, Disp: 6 tablet, Rfl: 0  •  ergocalciferol (VITAMIN D2) 50,000 units, Take 1 capsule (50,000 Units total) by mouth once a week, Disp: 12 capsule, Rfl: 0  •  escitalopram (LEXAPRO) 10 mg tablet, TAKE 1 TABLET BY MOUTH EVERY DAY, Disp: 90 tablet, Rfl: 2  •  pseudoephedrine (SUDAFED) 120 MG 12 hr tablet, Take 120 mg by mouth 2 (two) times a day PRN  , Disp: , Rfl:   •  clobetasol (TEMOVATE) 0 05 % cream, Apply topically 3 (three) times a day (Patient not taking: Reported on 2022), Disp: 45 g, Rfl: 0  •  fexofenadine (ALLEGRA) 180 MG tablet, Take 180 mg by mouth daily (Patient not taking: Reported on 2022), Disp: , Rfl:   •  fluticasone (FLONASE) 50 mcg/act nasal spray, 2 sprays into each nostril daily (Patient not taking: Reported on 2022), Disp: 1 Bottle, Rfl: 0  •  hydrOXYzine HCL (ATARAX) 25 mg tablet, TAKE 1 TO 2 TABLETS BY MOUTH AT BEDTIME AS NEEDED FOR INSOMNIA (Patient not taking: Reported on 2022), Disp: 180 tablet, Rfl: 1    Current Allergies     Allergies as of 2022 - Reviewed 2022   Allergen Reaction Noted   • Cefprozil Shortness Of Breath 05/15/2017   • Pertussis vaccine     • Shellfish-derived products - food allergy Hives 2017   • Amoxicillin  2017            The following portions of the patient's history were reviewed and updated as appropriate: allergies, current medications, past family history, past medical history, past social history, past surgical history and problem list      Past Medical History:   Diagnosis Date   • Anxiety    • BRCA1 negative    • BRCA2 negative    • Chronic kidney disease     [id number]   • Depression    • Disease of thyroid gland     nodule   • Endometriosis    • Female infertility     iui with prior preg   • GERD (gastroesophageal reflux disease)    • Gestational diabetes    • Kidney stone    • PONV (postoperative nausea and vomiting)     gets "very sick"   • Renal calculi     last assessed 10/20/14; US 10/2014 3mm and 4mm right, non obstructing   • Varicella     childhood       Past Surgical History:   Procedure Laterality Date   • APPENDECTOMY     •  SECTION     • CYSTOSCOPY N/A 3/1/2021 Procedure: CYSTOSCOPY;  Surgeon: Mejia Kennedy DO;  [facility];  Service: Gynecology   • DIAGNOSTIC LAPAROSCOPY      multiple, last 10/2012, 10/2014   • LAPAROSCOPIC OVARIAN CYSTECTOMY     • LASER LAPAROSCOPY     • NASAL SEPTUM SURGERY     • [place]  DELIVERY ONLY N/A 2017    Procedure:  SECTION () REPEAT;  Surgeon: Akshat Boykin DO;  [facility];  Service: Obstetrics   • [place] LAPAROSCOPY SUPRACERVICAL HYSTERECTOMY 250 GM/< N/A 3/1/2021    Procedure: L S H , BILATERAL SALPINGECTOMY, RIGHT OOPHORECTOMY;  Surgeon: Mejia Kennedy DO;  [facility];  Service: Gynecology   • [place] LIG/TRNSXJ [place] DEL/ABDML SURG Bilateral 2017    Procedure: LIGATION/COAGULATION TUBAL;  Surgeon: Akshat Boykin DO;  [facility];  Service: Obstetrics   • TONSILLECTOMY     • TUBAL LIGATION         Family History   Problem Relation Age of Onset   • Depression Mother    • Hypertension Mother    • Miscarriages / Stillbirths Mother    • Anxiety disorder Mother         NOS   • Heart defect Mother         aortic valve disorder   • Endometriosis Mother    • Hyperlipidemia Father         high cholesterol   • Breast cancer Sister 29   • Cancer Sister         breast   • Colon cancer Maternal Grandmother 76   • Cancer Maternal Grandmother         colon   • Depression Maternal Grandmother    • Dementia Maternal Grandmother    • Vision loss Maternal Grandmother    • Arthritis Paternal Grandmother    • Hearing loss Paternal Grandmother    • Heart disease Paternal Grandmother    • Stroke Paternal Grandmother    • Miscarriages / Stillbirths Paternal Grandmother    • Colon cancer Paternal Grandmother 72   • Alcohol abuse Maternal Uncle    • Breast cancer Maternal Aunt 58   • No Known Problems Maternal Aunt    • No Known Problems Maternal Aunt    • No Known Problems Paternal Aunt          Medications have been verified          Objective   /72   Pulse 96   Temp 98 °F (36 7 °C) (Tympanic)   Resp 18   Ht 5' 6" (1 676 m)   Wt 75 8 kg (167 lb)   LMP 02/01/2019 (Exact Date)   SpO2 97%   BMI 26 95 kg/m²        Physical Exam     Physical Exam  Vitals and nursing note reviewed  Constitutional:       Appearance: Normal appearance  She is normal weight  HENT:      Head: Normocephalic and atraumatic  Right Ear: Tympanic membrane, ear canal and external ear normal       Left Ear: Tympanic membrane, ear canal and external ear normal       Nose: Rhinorrhea present  Mouth/Throat:      Mouth: Mucous membranes are moist       Pharynx: Oropharynx is clear  Posterior oropharyngeal erythema present  Eyes:      Extraocular Movements: Extraocular movements intact  Conjunctiva/sclera: Conjunctivae normal       Pupils: Pupils are equal, round, and reactive to light  Cardiovascular:      Rate and Rhythm: Normal rate and regular rhythm  Pulses: Normal pulses  Heart sounds: Normal heart sounds  Pulmonary:      Effort: Pulmonary effort is normal       Breath sounds: Normal breath sounds  Abdominal:      General: Abdomen is flat  Bowel sounds are normal       Palpations: Abdomen is soft  Musculoskeletal:         General: Normal range of motion  Cervical back: Normal range of motion and neck supple  Lymphadenopathy:      Cervical: Cervical adenopathy present  Skin:     General: Skin is warm  Capillary Refill: Capillary refill takes less than 2 seconds  Neurological:      General: No focal deficit present  Mental Status: She is alert and oriented to person, place, and time     Psychiatric:         Mood and Affect: Mood normal          Behavior: Behavior normal

## 2022-12-27 LAB
FLUAV RNA RESP QL NAA+PROBE: NEGATIVE
FLUBV RNA RESP QL NAA+PROBE: NEGATIVE
SARS-COV-2 RNA RESP QL NAA+PROBE: NEGATIVE

## 2022-12-29 LAB — BACTERIA THROAT CULT: NORMAL

## 2023-03-22 ENCOUNTER — OFFICE VISIT (OUTPATIENT)
Dept: FAMILY MEDICINE CLINIC | Facility: CLINIC | Age: 43
End: 2023-03-22

## 2023-03-22 VITALS
SYSTOLIC BLOOD PRESSURE: 112 MMHG | WEIGHT: 160.8 LBS | OXYGEN SATURATION: 98 % | HEART RATE: 81 BPM | RESPIRATION RATE: 16 BRPM | DIASTOLIC BLOOD PRESSURE: 86 MMHG | TEMPERATURE: 97.6 F | BODY MASS INDEX: 25.84 KG/M2 | HEIGHT: 66 IN

## 2023-03-22 DIAGNOSIS — R10.13 EPIGASTRIC ABDOMINAL PAIN: Primary | ICD-10-CM

## 2023-03-22 DIAGNOSIS — E78.5 DYSLIPIDEMIA: ICD-10-CM

## 2023-03-22 DIAGNOSIS — R10.11 RUQ ABDOMINAL PAIN: ICD-10-CM

## 2023-03-22 DIAGNOSIS — J01.91 ACUTE RECURRENT SINUSITIS, UNSPECIFIED LOCATION: ICD-10-CM

## 2023-03-22 RX ORDER — DOXYCYCLINE HYCLATE 100 MG/1
100 CAPSULE ORAL
Qty: 28 CAPSULE | Refills: 0 | Status: SHIPPED | OUTPATIENT
Start: 2023-03-22 | End: 2023-04-05

## 2023-03-22 RX ORDER — PANTOPRAZOLE SODIUM 40 MG/1
40 TABLET, DELAYED RELEASE ORAL DAILY
Qty: 30 TABLET | Refills: 1 | Status: SHIPPED | OUTPATIENT
Start: 2023-03-22

## 2023-03-22 NOTE — PROGRESS NOTES
Name: Eleazar Mendoza      : 1980      MRN: [id number]  Encounter Provider: Maria C Mesa MD  Encounter Date: 3/22/2023   Encounter department: [address] [place] [address]     1  Epigastric abdominal pain  -     pantoprazole (PROTONIX) 40 mg tablet; Take 1 tablet (40 mg total) by mouth daily    2  RUQ abdominal pain  -     Comprehensive metabolic panel; Future  -     TSH, 3rd generation; Future  -     CBC and differential; Future  -     US right upper quadrant; Future; Expected date: 2023    3  Acute recurrent sinusitis, unspecified location  -     doxycycline hyclate (VIBRAMYCIN) 100 mg capsule; Take 1 capsule (100 mg total) by mouth 2 (two) times daily after meals for 14 days    4  Dyslipidemia  -     Lipid panel; Future  Patient presents for evaluation of epigastric and right upper quadrant abdominal pain  Her pain is primarily localized in upper epigastrium but she experiences tenderness with right upper quadrant palpation on exam today  Differential includes gastroduodenitis, peptic ulcer disease versus gallstones  I recommend to proceed with right upper quadrant abdominal ultrasound and blood work  We discussed importance of bland diet  Start Protonix 40 mg daily  Patient will start doxycycline 100 mg twice daily for 14 days due to recurrence of sinusitis  Follow-up pending labs, diagnostic testing and updates  Subjective     Epigastric pain, intermittent, since September, nearly 6 months, symptoms have worsened lately  Patient said that she lost 12 pounds since eating is causing abdominal pain  Patient said that pain is primarily epigastric, slightly to the right  Occasional nausea, no vomiting  Eating makes pain worse  Patient has been avoiding meat as it has been provoking her abdominal symptoms  Patient said that occasionally she wakes up in the middle of the night with abdominal pain    She has been using Tylenol and Advil with partial but incomplete improvement  Bowel movements are regular  No excessive burping or belching  Pain is described as pulling discomfort in the epigastrium with the sensation that "somebody is punching on patient's rib cage"  Occasionally pain radiates to the mid back with a piercing sensation  No radiation to the chest or scapular  Patient is also complaining of worsening of sinus symptoms within past few weeks  She is complaining of sinus pressure and has noticed that she has been using Excedrin more excessively for treatment of sinus/chronic headaches  Abdominal Pain  Associated symptoms include nausea  Pertinent negatives include no constipation, diarrhea or fever  Review of Systems   Constitutional: Positive for unexpected weight change  Negative for fever  12 pound weight loss due to abdominal pain   HENT: Positive for congestion, sinus pressure and sinus pain  Eyes: Negative  Respiratory: Negative  Cardiovascular: Negative  Gastrointestinal: Positive for abdominal pain and nausea  Negative for constipation and diarrhea  Genitourinary: Negative  Musculoskeletal: Negative  Neurological: Negative  Psychiatric/Behavioral: Negative          Past Medical History:   Diagnosis Date   • Anxiety    • BRCA1 negative    • BRCA2 negative    • Chronic kidney disease     [id number]   • Depression    • Disease of thyroid gland     nodule   • Endometriosis    • Female infertility     iui with prior preg   • GERD (gastroesophageal reflux disease)    • Gestational diabetes    • Kidney stone    • PONV (postoperative nausea and vomiting)     gets "very sick"   • Renal calculi     last assessed 10/20/14; US 10/2014 3mm and 4mm right, non obstructing   • Varicella     childhood     Past Surgical History:   Procedure Laterality Date   • APPENDECTOMY     •  SECTION     • CYSTOSCOPY N/A 3/1/2021    Procedure: CYSTOSCOPY;  Surgeon: Vanessa Sosa DO;  Location: [facility];  Service: Gynecology   • DIAGNOSTIC LAPAROSCOPY      multiple, last 10/2012, 10/2014   • LAPAROSCOPIC OVARIAN CYSTECTOMY     • LASER LAPAROSCOPY     • NASAL SEPTUM SURGERY     • NJ  DELIVERY ONLY N/A 2017    Procedure:  SECTION () REPEAT;  Surgeon: Quyen Alvarado DO;  [facility]: [facility];  Service: Obstetrics   • NJ LAPAROSCOPY SUPRACERVICAL HYSTERECTOMY 250 GM/< N/A 3/1/2021    Procedure: L S H , BILATERAL SALPINGECTOMY, RIGHT OOPHORECTOMY;  Surgeon: Anastasiya Chadwick DO;  Location: [facility];  Service: Gynecology   • NJ LIG/TRNSXJ [place] DEL/ABDML SURG Bilateral 2017    Procedure: LIGATION/COAGULATION TUBAL;  Surgeon: Quyen Alvarado DO;  Location: [facility];  Service: Obstetrics   • TONSILLECTOMY     • TUBAL LIGATION       Family History   Problem Relation Age of Onset   • Depression Mother    • Hypertension Mother    • Miscarriages / [place] Mother    • Anxiety disorder Mother         NOS   • Heart defect Mother         aortic valve disorder   • Endometriosis Mother    • Hyperlipidemia Father         high cholesterol   • Breast cancer Sister 29   • Cancer Sister         breast   • Colon cancer Maternal Grandmother 76   • Cancer Maternal Grandmother         colon   • Depression Maternal Grandmother    • Dementia Maternal Grandmother    • Vision loss Maternal Grandmother    • Arthritis Paternal Grandmother    • Hearing loss Paternal Grandmother    • Heart disease Paternal Grandmother    • Stroke Paternal Grandmother    • Miscarriages / Stillbirths Paternal Grandmother    • Colon cancer Paternal Grandmother 72   • Alcohol abuse Maternal Uncle    • Breast cancer Maternal Aunt 58   • No Known Problems Maternal Aunt    • No Known Problems Maternal Aunt    • No Known Problems Paternal Aunt      Social History     Socioeconomic History   • Marital status: /Civil Union     Spouse name: None   • Number of children: None   • Years of education: None   • Highest education level: None   Occupational History   • None   Tobacco Use   • Smoking status: Former     Types: Cigarettes   • Smokeless tobacco: Never   • Tobacco comments:     Current smoker-quit 2 weeks agao, when found pregnant, smoked half pay daily, as per [employer]   Vaping Use   • Vaping Use: Never used   Substance and Sexual Activity   • Alcohol use: No   • Drug use: No   • Sexual activity: Yes     Partners: Male     Birth control/protection: Female Sterilization   Other Topics Concern   • None   Social History Narrative   • None     Social Determinants of Health     Financial Resource Strain: Not on file   Food Insecurity: Not on file   Transportation Needs: Not on file   Physical Activity: Not on file   Stress: Not on file   Social Connections: Not on file   Intimate Partner Violence: Not on file   Housing Stability: Not on file     Current Outpatient Medications on File Prior to Visit   Medication Sig   • APPLE CIDER VINEGAR PO Take 1 gum by mouth daily   • ergocalciferol (VITAMIN D2) 50,000 units Take 1 capsule (50,000 Units total) by mouth once a week   • escitalopram (LEXAPRO) 10 mg tablet TAKE 1 TABLET BY MOUTH EVERY DAY   • fexofenadine (ALLEGRA) 180 MG tablet Take 180 mg by mouth daily   • fluticasone (FLONASE) 50 mcg/act nasal spray 2 sprays into each nostril daily   • pseudoephedrine (SUDAFED) 120 MG 12 hr tablet Take 120 mg by mouth 2 (two) times a day PRN     • hydrOXYzine HCL (ATARAX) 25 mg tablet TAKE 1 TO 2 TABLETS BY MOUTH AT BEDTIME AS NEEDED FOR INSOMNIA     Allergies   Allergen Reactions   • Cefprozil Shortness Of Breath   • Pertussis Vaccine    • Shellfish-Derived Products - Food Allergy Hives   • Amoxicillin      Thrush,yeast infection     Immunization History   Administered Date(s) Administered   • COVID-19 PFIZER VACCINE 0 3 ML IM 04/13/2021, 05/04/2021, 12/15/2021   • DT (pediatric) 07/16/1984, 10/11/1993   • DTaP 1980, [birth year]   • DTaP 5 1980, 1980, 1982, 03/28/1983, 08/29/2008   • INFLUENZA 10/10/2015, 10/08/2016, 10/17/2017   • IPV 01/03/1983, 01/28/1983, 04/25/1983, 07/16/1984, 10/01/1987   • Influenza Quadrivalent Preservative Free 3 years and older IM 10/10/2015, 10/08/2016   • Influenza, Quadrivalent (nasal) 10/10/2015, 10/08/2016   • Influenza, injectable, quadrivalent, preservative free 0 5 mL 10/29/2018, 10/08/2019, 10/24/2020, 11/15/2021   • MMR 11/23/1983, 10/11/1993   • Meningococcal MCV4P 08/02/2000, 08/29/2008   • Meningococcal, Unknown Serogroups 08/02/2000, 08/29/2008   • Mumps 08/22/1983   • Rubella 11/23/1983, 09/21/1984   • Tuberculin Skin Test-PPD Intradermal 02/06/2012       Objective     /86 (BP Location: Left arm, Patient Position: Sitting, Cuff Size: Standard)   Pulse 81   Temp 97 6 °F (36 4 °C) (Temporal)   Resp 16   Ht 5' 6" (1 676 m)   Wt 72 9 kg (160 lb 12 8 oz)   LMP 02/01/2019 (Exact Date)   SpO2 98%   BMI 25 95 kg/m²     Physical Exam  Constitutional:       General: She is not in acute distress  Appearance: Normal appearance  She is well-developed  She is not ill-appearing  HENT:      Head: Normocephalic and atraumatic  Right Ear: A middle ear effusion is present  Tympanic membrane is not erythematous  Left Ear: A middle ear effusion is present  Tympanic membrane is not erythematous  Nose: Congestion present  Mouth/Throat:      Pharynx: Posterior oropharyngeal erythema (thick white PND) present  Cardiovascular:      Rate and Rhythm: Normal rate and regular rhythm  Heart sounds: No murmur heard  Pulmonary:      Effort: Pulmonary effort is normal       Breath sounds: Normal breath sounds  No stridor  Abdominal:      General: Abdomen is flat  Bowel sounds are normal       Palpations: Abdomen is soft  Tenderness: There is abdominal tenderness in the right upper quadrant and epigastric area  There is no guarding or rebound  Negative signs include Denton's sign        Hernia: No hernia is present  Musculoskeletal:      Cervical back: Neck supple  No rigidity  Neurological:      General: No focal deficit present  Mental Status: She is alert  Psychiatric:         Mood and Affect: Mood normal          Behavior: Behavior normal          Thought Content:  Thought content normal        Cassia Machado MD

## 2023-03-24 ENCOUNTER — APPOINTMENT (OUTPATIENT)
Dept: LAB | Facility: CLINIC | Age: 43
End: 2023-03-24

## 2023-03-24 DIAGNOSIS — R10.11 RUQ ABDOMINAL PAIN: ICD-10-CM

## 2023-03-24 DIAGNOSIS — E78.5 DYSLIPIDEMIA: ICD-10-CM

## 2023-03-24 LAB
ALBUMIN SERPL BCP-MCNC: 4 G/DL (ref 3.5–5)
ALP SERPL-CCNC: 99 U/L (ref 46–116)
ALT SERPL W P-5'-P-CCNC: 18 U/L (ref 12–78)
ANION GAP SERPL CALCULATED.3IONS-SCNC: 4 MMOL/L (ref 4–13)
AST SERPL W P-5'-P-CCNC: 11 U/L (ref 5–45)
BASOPHILS # BLD AUTO: 0.05 THOUSANDS/ÂΜL (ref 0–0.1)
BASOPHILS NFR BLD AUTO: 1 % (ref 0–1)
BILIRUB SERPL-MCNC: 0.56 MG/DL (ref 0.2–1)
BUN SERPL-MCNC: 10 MG/DL (ref 5–25)
CALCIUM SERPL-MCNC: 9.7 MG/DL (ref 8.3–10.1)
CHLORIDE SERPL-SCNC: 106 MMOL/L (ref 96–108)
CHOLEST SERPL-MCNC: 195 MG/DL
CO2 SERPL-SCNC: 29 MMOL/L (ref 21–32)
CREAT SERPL-MCNC: 0.59 MG/DL (ref 0.6–1.3)
EOSINOPHIL # BLD AUTO: 0.1 THOUSAND/ÂΜL (ref 0–0.61)
EOSINOPHIL NFR BLD AUTO: 2 % (ref 0–6)
ERYTHROCYTE [DISTWIDTH] IN BLOOD BY AUTOMATED COUNT: 13.2 % (ref 11.6–15.1)
GFR SERPL CREATININE-BSD FRML MDRD: 115 ML/MIN/1.73SQ M
GLUCOSE P FAST SERPL-MCNC: 104 MG/DL (ref 65–99)
HCT VFR BLD AUTO: 43.6 % (ref 34.8–46.1)
HDLC SERPL-MCNC: 32 MG/DL
HGB BLD-MCNC: 13.8 G/DL (ref 11.5–15.4)
IMM GRANULOCYTES # BLD AUTO: 0.01 THOUSAND/UL (ref 0–0.2)
IMM GRANULOCYTES NFR BLD AUTO: 0 % (ref 0–2)
LDLC SERPL CALC-MCNC: 133 MG/DL (ref 0–100)
LYMPHOCYTES # BLD AUTO: 2.25 THOUSANDS/ÂΜL (ref 0.6–4.47)
LYMPHOCYTES NFR BLD AUTO: 33 % (ref 14–44)
MCH RBC QN AUTO: 26.8 PG (ref 26.8–34.3)
MCHC RBC AUTO-ENTMCNC: 31.7 G/DL (ref 31.4–37.4)
MCV RBC AUTO: 85 FL (ref 82–98)
MONOCYTES # BLD AUTO: 0.34 THOUSAND/ÂΜL (ref 0.17–1.22)
MONOCYTES NFR BLD AUTO: 5 % (ref 4–12)
NEUTROPHILS # BLD AUTO: 3.98 THOUSANDS/ÂΜL (ref 1.85–7.62)
NEUTS SEG NFR BLD AUTO: 59 % (ref 43–75)
NONHDLC SERPL-MCNC: 163 MG/DL
NRBC BLD AUTO-RTO: 0 /100 WBCS
PLATELET # BLD AUTO: 280 THOUSANDS/UL (ref 149–390)
PMV BLD AUTO: 11 FL (ref 8.9–12.7)
POTASSIUM SERPL-SCNC: 3.7 MMOL/L (ref 3.5–5.3)
PROT SERPL-MCNC: 7.1 G/DL (ref 6.4–8.4)
RBC # BLD AUTO: 5.15 MILLION/UL (ref 3.81–5.12)
SODIUM SERPL-SCNC: 139 MMOL/L (ref 135–147)
TRIGL SERPL-MCNC: 151 MG/DL
TSH SERPL DL<=0.05 MIU/L-ACNC: 1.18 UIU/ML (ref 0.45–4.5)
WBC # BLD AUTO: 6.73 THOUSAND/UL (ref 4.31–10.16)

## 2023-03-25 ENCOUNTER — HOSPITAL ENCOUNTER (OUTPATIENT)
Dept: RADIOLOGY | Facility: HOSPITAL | Age: 43
Discharge: HOME | End: 2023-03-25

## 2023-03-25 DIAGNOSIS — R10.11 RUQ ABDOMINAL PAIN: ICD-10-CM

## 2023-03-27 ENCOUNTER — TELEPHONE (OUTPATIENT)
Dept: FAMILY MEDICINE CLINIC | Facility: CLINIC | Age: 43
End: 2023-03-27

## 2023-03-27 DIAGNOSIS — K80.20 CALCULUS OF GALLBLADDER WITHOUT CHOLECYSTITIS WITHOUT OBSTRUCTION: ICD-10-CM

## 2023-03-27 DIAGNOSIS — K29.90 GASTRODUODENITIS: Primary | ICD-10-CM

## 2023-03-27 NOTE — TELEPHONE ENCOUNTER
Called pt to inform her of her results  No answer, left her a message to call us back  Going to send message through My Chart also

## 2023-03-27 NOTE — TELEPHONE ENCOUNTER
Please contact patient  I reviewed results of ultrasound  Finding of gallstone likely contributing to patient's abdominal pain  Incidental finding 2 small kidney stones on the right side  We can follow-up on this later  Patient should schedule consultation with general surgery regarding gallbladder findings  I will place a referral to  Dr Nathaniel Aggarwal  Patient should continue on pantoprazole as well      Thank you

## 2023-03-30 ENCOUNTER — CONSULT (OUTPATIENT)
Dept: SURGERY | Facility: CLINIC | Age: 43
End: 2023-03-30

## 2023-03-30 VITALS
BODY MASS INDEX: 25.71 KG/M2 | HEIGHT: 66 IN | DIASTOLIC BLOOD PRESSURE: 76 MMHG | HEART RATE: 99 BPM | WEIGHT: 160 LBS | SYSTOLIC BLOOD PRESSURE: 122 MMHG

## 2023-03-30 DIAGNOSIS — K80.10 CHRONIC CHOLECYSTITIS WITH CALCULUS: ICD-10-CM

## 2023-03-30 NOTE — H&P (VIEW-ONLY)
Assessment/Plan:    Diagnoses and all orders for this visit:    Chronic cholecystitis with calculus  -     Ambulatory referral to General Surgery    Risks and benefits of a laparoscopic cholecystectomy were discussed with her including potential for bile duct injury, bowel injury, or need for open surgery and she agrees to proceed  Subjective:      Patient ID: Ila Weems is a 38 y o  female  Patient presents for gallbladder consult  States she has had episodes of RUQ pain after eating for 6 months  Ultrasound RUQ 3/25/2023    IMPRESSION:  1  Cholelithiasis with mild wall thickening though the gallbladder is partially contracted which likely accounts for this finding  No pericholecystic fluid or sonographic Denton sign  2   Small nonobstructing right renal calculi  3   Essentially stable lesion in the liver with hyperechogenicity and hypoattenuation on CT, most consistent with hemangioma  Pain occurs after heavy meals  Episodes will last for several hours  She finds herself at times pacing in the evening until the pain subsides on its own  Recent blood work was unremarkable  Abdominal Pain  The current episode started more than 1 month ago  The problem occurs intermittently  The problem has been gradually worsening  The pain is located in the RUQ  The quality of the pain is sharp and dull  The abdominal pain radiates to the back  Associated symptoms include belching, flatus, nausea and vomiting  The pain is aggravated by eating  The pain is relieved by nothing  She has tried proton pump inhibitors for the symptoms  The treatment provided no relief  Prior diagnostic workup includes ultrasound  Her past medical history is significant for gallstones         The following portions of the patient's history were reviewed and updated as appropriate:     She  has a past medical history of Anxiety, BRCA1 negative, BRCA2 negative, Chronic kidney disease, Depression, Disease of thyroid gland, Endometriosis, Female infertility, GERD (gastroesophageal reflux disease), Gestational diabetes (), Kidney stone, PONV (postoperative nausea and vomiting), Renal calculi, and Varicella  She  has a past surgical history that includes  section; Appendectomy; Nasal septum surgery; pr  delivery only (N/A, 2017); pr lig/trnsxj falopian tube  del/abdml surg (Bilateral, 2017); Diagnostic laparoscopy; Laser laparoscopy (); Laparoscopic ovarian cystectomy; Tonsillectomy; Tubal ligation; pr laparoscopy supracervical hysterectomy 250 gm/< (N/A, 3/1/2021); and CYSTOSCOPY (N/A, 3/1/2021)  Her family history includes Alcohol abuse in her maternal uncle; Anxiety disorder in her mother; Arthritis in her paternal grandmother; Breast cancer (age of onset: 29) in her sister; Breast cancer (age of onset: 62) in her maternal aunt; Cancer in her maternal grandmother and sister; Colon cancer (age of onset: 76) in her maternal grandmother; Colon cancer (age of onset: 67) in her paternal grandmother; Dementia in her maternal grandmother; Depression in her maternal grandmother and mother; Endometriosis in her mother; Hearing loss in her paternal grandmother; Heart defect in her mother; Heart disease in her paternal grandmother; Hyperlipidemia in her father; Hypertension in her mother; Miscarriages / [place] in her mother and paternal grandmother; No Known Problems in her maternal aunt, maternal aunt, and paternal aunt; Stroke in her paternal grandmother; Vision loss in her maternal grandmother  She  reports that she has quit smoking  Her smoking use included cigarettes  She has never used smokeless tobacco  She reports that she does not drink alcohol and does not use drugs    Current Outpatient Medications   Medication Sig Dispense Refill   • APPLE CIDER VINEGAR PO Take 1 gum by mouth daily     • doxycycline hyclate (VIBRAMYCIN) 100 mg capsule Take 1 capsule (100 mg total) by mouth 2 (two) "times daily after meals for 14 days 28 capsule 0   • ergocalciferol (VITAMIN D2) 50,000 units Take 1 capsule (50,000 Units total) by mouth once a week 12 capsule 0   • escitalopram (LEXAPRO) 10 mg tablet TAKE 1 TABLET BY MOUTH EVERY DAY 90 tablet 2   • fexofenadine (ALLEGRA) 180 MG tablet Take 180 mg by mouth daily     • fluticasone (FLONASE) 50 mcg/act nasal spray 2 sprays into each nostril daily 1 Bottle 0   • hydrOXYzine HCL (ATARAX) 25 mg tablet TAKE 1 TO 2 TABLETS BY MOUTH AT BEDTIME AS NEEDED FOR INSOMNIA 180 tablet 1   • pantoprazole (PROTONIX) 40 mg tablet Take 1 tablet (40 mg total) by mouth daily 30 tablet 1   • pseudoephedrine (SUDAFED) 120 MG 12 hr tablet Take 120 mg by mouth 2 (two) times a day PRN         No current facility-administered medications for this visit  She is allergic to cefprozil, pertussis vaccine, shellfish-derived products - food allergy, and amoxicillin       Review of Systems   Gastrointestinal: Positive for abdominal distention, abdominal pain, flatus, nausea and vomiting  All other systems reviewed and are negative  Objective:      /76   Pulse 99   Ht 5' 5 5\" (1 664 m)   Wt 72 6 kg (160 lb)   LMP 02/01/2019 (Exact Date)   BMI 26 22 kg/m²          Physical Exam  Constitutional:       General: She is not in acute distress  HENT:      Head: Atraumatic  Eyes:      Extraocular Movements: Extraocular movements intact  Cardiovascular:      Rate and Rhythm: Normal rate  Pulmonary:      Effort: Pulmonary effort is normal    Abdominal:      General: Abdomen is flat  Palpations: Abdomen is soft  There is no mass  Tenderness: There is no abdominal tenderness  Musculoskeletal:      Cervical back: Normal range of motion  Skin:     General: Skin is warm and dry  Neurological:      Mental Status: She is alert         Extremities: No edema  "

## 2023-03-30 NOTE — PROGRESS NOTES
Assessment/Plan:    Diagnoses and all orders for this visit:    Chronic cholecystitis with calculus  -     Ambulatory referral to General Surgery    Risks and benefits of a laparoscopic cholecystectomy were discussed with her including potential for bile duct injury, bowel injury, or need for open surgery and she agrees to proceed  Subjective:      Patient ID: Harry Sidhu is a 38 y o  female  Patient presents for gallbladder consult  States she has had episodes of RUQ pain after eating for 6 months  Ultrasound RUQ 3/25/2023    IMPRESSION:  1  Cholelithiasis with mild wall thickening though the gallbladder is partially contracted which likely accounts for this finding  No pericholecystic fluid or sonographic Denton sign  2   Small nonobstructing right renal calculi  3   Essentially stable lesion in the liver with hyperechogenicity and hypoattenuation on CT, most consistent with hemangioma  Pain occurs after heavy meals  Episodes will last for several hours  She finds herself at times pacing in the evening until the pain subsides on its own  Recent blood work was unremarkable  Abdominal Pain  The current episode started more than 1 month ago  The problem occurs intermittently  The problem has been gradually worsening  The pain is located in the RUQ  The quality of the pain is sharp and dull  The abdominal pain radiates to the back  Associated symptoms include belching, flatus, nausea and vomiting  The pain is aggravated by eating  The pain is relieved by nothing  She has tried proton pump inhibitors for the symptoms  The treatment provided no relief  Prior diagnostic workup includes ultrasound  Her past medical history is significant for gallstones         The following portions of the patient's history were reviewed and updated as appropriate:     She  has a past medical history of Anxiety, BRCA1 negative, BRCA2 negative, Chronic kidney disease, Depression, Disease of thyroid gland, Endometriosis, Female infertility, GERD (gastroesophageal reflux disease), Gestational diabetes (), Kidney stone, PONV (postoperative nausea and vomiting), Renal calculi, and Varicella  She  has a past surgical history that includes  section; Appendectomy; Nasal septum surgery; pr  delivery only (N/A, 2017); pr lig/trnsxj falopian tube  del/abdml surg (Bilateral, 2017); Diagnostic laparoscopy; Laser laparoscopy (); Laparoscopic ovarian cystectomy; Tonsillectomy; Tubal ligation; pr laparoscopy supracervical hysterectomy 250 gm/< (N/A, 3/1/2021); and CYSTOSCOPY (N/A, 3/1/2021)  Her family history includes Alcohol abuse in her maternal uncle; Anxiety disorder in her mother; Arthritis in her paternal grandmother; Breast cancer (age of onset: 29) in her sister; Breast cancer (age of onset: 62) in her maternal aunt; Cancer in her maternal grandmother and sister; Colon cancer (age of onset: 76) in her maternal grandmother; Colon cancer (age of onset: 67) in her paternal grandmother; Dementia in her maternal grandmother; Depression in her maternal grandmother and mother; Endometriosis in her mother; Hearing loss in her paternal grandmother; Heart defect in her mother; Heart disease in her paternal grandmother; Hyperlipidemia in her father; Hypertension in her mother; Miscarriages / [place] in her mother and paternal grandmother; No Known Problems in her maternal aunt, maternal aunt, and paternal aunt; Stroke in her paternal grandmother; Vision loss in her maternal grandmother  She  reports that she has quit smoking  Her smoking use included cigarettes  She has never used smokeless tobacco  She reports that she does not drink alcohol and does not use drugs    Current Outpatient Medications   Medication Sig Dispense Refill   • APPLE CIDER VINEGAR PO Take 1 gum by mouth daily     • doxycycline hyclate (VIBRAMYCIN) 100 mg capsule Take 1 capsule (100 mg total) by mouth 2 (two) "times daily after meals for 14 days 28 capsule 0   • ergocalciferol (VITAMIN D2) 50,000 units Take 1 capsule (50,000 Units total) by mouth once a week 12 capsule 0   • escitalopram (LEXAPRO) 10 mg tablet TAKE 1 TABLET BY MOUTH EVERY DAY 90 tablet 2   • fexofenadine (ALLEGRA) 180 MG tablet Take 180 mg by mouth daily     • fluticasone (FLONASE) 50 mcg/act nasal spray 2 sprays into each nostril daily 1 Bottle 0   • hydrOXYzine HCL (ATARAX) 25 mg tablet TAKE 1 TO 2 TABLETS BY MOUTH AT BEDTIME AS NEEDED FOR INSOMNIA 180 tablet 1   • pantoprazole (PROTONIX) 40 mg tablet Take 1 tablet (40 mg total) by mouth daily 30 tablet 1   • pseudoephedrine (SUDAFED) 120 MG 12 hr tablet Take 120 mg by mouth 2 (two) times a day PRN         No current facility-administered medications for this visit  She is allergic to cefprozil, pertussis vaccine, shellfish-derived products - food allergy, and amoxicillin       Review of Systems   Gastrointestinal: Positive for abdominal distention, abdominal pain, flatus, nausea and vomiting  All other systems reviewed and are negative  Objective:      /76   Pulse 99   Ht 5' 5 5\" (1 664 m)   Wt 72 6 kg (160 lb)   LMP 02/01/2019 (Exact Date)   BMI 26 22 kg/m²          Physical Exam  Constitutional:       General: She is not in acute distress  HENT:      Head: Atraumatic  Eyes:      Extraocular Movements: Extraocular movements intact  Cardiovascular:      Rate and Rhythm: Normal rate  Pulmonary:      Effort: Pulmonary effort is normal    Abdominal:      General: Abdomen is flat  Palpations: Abdomen is soft  There is no mass  Tenderness: There is no abdominal tenderness  Musculoskeletal:      Cervical back: Normal range of motion  Skin:     General: Skin is warm and dry  Neurological:      Mental Status: She is alert         Extremities: No edema  "

## 2023-04-03 ENCOUNTER — PREP FOR PROCEDURE (OUTPATIENT)
Dept: SURGERY | Facility: CLINIC | Age: 43
End: 2023-04-03

## 2023-04-03 DIAGNOSIS — K80.10 CHRONIC CALCULOUS CHOLECYSTITIS: Primary | ICD-10-CM

## 2023-04-10 ENCOUNTER — ANESTHESIA EVENT (OUTPATIENT)
Dept: PERIOP | Facility: AMBULARY SURGERY CENTER | Age: 43
End: 2023-04-10

## 2023-04-19 NOTE — PRE-PROCEDURE INSTRUCTIONS
Pre-Surgery Instructions:   Medication Instructions   • escitalopram (LEXAPRO) 10 mg tablet Take night before surgery   • fexofenadine (ALLEGRA) 180 MG tablet Uses PRN- OK to take day of surgery   • fluticasone (FLONASE) 50 mcg/act nasal spray Uses PRN- OK to take day of surgery   • pseudoephedrine (SUDAFED) 120 MG 12 hr tablet Uses PRN- OK to take day of surgery    Medication instructions for day surgery reviewed  Please use only a sip of water to take your instructed medications  Avoid all over the counter vitamins, supplements and NSAIDS for one week prior to surgery per anesthesia guidelines  Tylenol is ok to take as needed  You will receive a call one business day prior to surgery with an arrival time and hospital directions  If your surgery is scheduled on a Monday, the hospital will be calling you on the Friday prior to your surgery  If you have not heard from anyone by 8pm, please call the hospital supervisor through the hospital  at [phone]  Rebecca  [phone])  Do not eat or drink anything after midnight the night before your surgery, including candy, mints, lifesavers, or chewing gum  Do not drink alcohol 24hrs before your surgery  Try not to smoke at least 24hrs before your surgery  Follow the pre surgery showering instructions as listed in the [facility] Surgical Experience Booklet” or otherwise provided by your surgeon's office  Do not shave the surgical area 24 hours before surgery  Do not apply any lotions, creams, including makeup, cologne, deodorant, or perfumes after showering on the day of your surgery  No contact lenses, eye make-up, or artificial eyelashes  Remove nail polish, including gel polish, and any artificial, gel, or acrylic nails if possible  Remove all jewelry including rings and body piercing jewelry  Wear causal clothing that is easy to take on and off  Consider your type of surgery  Keep any valuables, jewelry, piercings at home   Please bring any specially ordered equipment (sling, braces) if indicated  Arrange for a responsible person to drive you to and from the hospital on the day of your surgery  Visitor Guidelines discussed  Call the surgeon's office with any new illnesses, exposures, or additional questions prior to surgery  Please reference your [facility] Surgical Experience Booklet” for additional information to prepare for your upcoming surgery

## 2023-04-23 RX ORDER — CLINDAMYCIN PHOSPHATE 900 MG/50ML
900 INJECTION INTRAVENOUS
Status: COMPLETED | OUTPATIENT
Start: 2023-04-24 | End: 2023-04-24

## 2023-04-24 ENCOUNTER — HOSPITAL ENCOUNTER (OUTPATIENT)
Facility: AMBULARY SURGERY CENTER | Age: 43
Setting detail: OUTPATIENT SURGERY
Discharge: HOME | End: 2023-04-24
Attending: SURGERY | Admitting: SURGERY

## 2023-04-24 ENCOUNTER — ANESTHESIA (OUTPATIENT)
Dept: PERIOP | Facility: AMBULARY SURGERY CENTER | Age: 43
End: 2023-04-24

## 2023-04-24 VITALS
OXYGEN SATURATION: 96 % | SYSTOLIC BLOOD PRESSURE: 162 MMHG | RESPIRATION RATE: 17 BRPM | DIASTOLIC BLOOD PRESSURE: 73 MMHG | TEMPERATURE: 97.9 F | HEIGHT: 66 IN | WEIGHT: 161 LBS | BODY MASS INDEX: 25.88 KG/M2 | HEART RATE: 86 BPM

## 2023-04-24 DIAGNOSIS — K80.10 CHRONIC CALCULOUS CHOLECYSTITIS: ICD-10-CM

## 2023-04-24 DIAGNOSIS — K80.20 CALCULUS OF GALLBLADDER WITHOUT CHOLECYSTITIS WITHOUT OBSTRUCTION: Primary | ICD-10-CM

## 2023-04-24 RX ORDER — OXYCODONE HYDROCHLORIDE 5 MG/1
5 TABLET ORAL EVERY 4 HOURS PRN
Qty: 10 TABLET | Refills: 0 | Status: SHIPPED | OUTPATIENT
Start: 2023-04-24 | End: 2023-05-04

## 2023-04-24 RX ORDER — MAGNESIUM HYDROXIDE 1200 MG/15ML
LIQUID ORAL AS NEEDED
Status: DISCONTINUED | OUTPATIENT
Start: 2023-04-24 | End: 2023-04-24 | Stop reason: HOSPADM

## 2023-04-24 RX ORDER — MEPERIDINE HYDROCHLORIDE 25 MG/ML
12.5 INJECTION INTRAMUSCULAR; INTRAVENOUS; SUBCUTANEOUS
Status: DISCONTINUED | OUTPATIENT
Start: 2023-04-24 | End: 2023-04-24 | Stop reason: HOSPADM

## 2023-04-24 RX ORDER — ONDANSETRON 2 MG/ML
4 INJECTION INTRAMUSCULAR; INTRAVENOUS EVERY 4 HOURS PRN
Status: DISCONTINUED | OUTPATIENT
Start: 2023-04-24 | End: 2023-04-24 | Stop reason: HOSPADM

## 2023-04-24 RX ORDER — HYDROMORPHONE HCL/PF 1 MG/ML
0.5 SYRINGE (ML) INJECTION
Status: DISCONTINUED | OUTPATIENT
Start: 2023-04-24 | End: 2023-04-24 | Stop reason: HOSPADM

## 2023-04-24 RX ORDER — LIDOCAINE HYDROCHLORIDE 20 MG/ML
INJECTION, SOLUTION EPIDURAL; INFILTRATION; INTRACAUDAL; PERINEURAL AS NEEDED
Status: DISCONTINUED | OUTPATIENT
Start: 2023-04-24 | End: 2023-04-24

## 2023-04-24 RX ORDER — DEXAMETHASONE SODIUM PHOSPHATE 10 MG/ML
INJECTION, SOLUTION INTRAMUSCULAR; INTRAVENOUS AS NEEDED
Status: DISCONTINUED | OUTPATIENT
Start: 2023-04-24 | End: 2023-04-24

## 2023-04-24 RX ORDER — MIDAZOLAM HYDROCHLORIDE 2 MG/2ML
INJECTION, SOLUTION INTRAMUSCULAR; INTRAVENOUS AS NEEDED
Status: DISCONTINUED | OUTPATIENT
Start: 2023-04-24 | End: 2023-04-24

## 2023-04-24 RX ORDER — SCOLOPAMINE TRANSDERMAL SYSTEM 1 MG/1
1 PATCH, EXTENDED RELEASE TRANSDERMAL
Status: DISCONTINUED | OUTPATIENT
Start: 2023-04-24 | End: 2023-04-24 | Stop reason: HOSPADM

## 2023-04-24 RX ORDER — ONDANSETRON 2 MG/ML
INJECTION INTRAMUSCULAR; INTRAVENOUS AS NEEDED
Status: DISCONTINUED | OUTPATIENT
Start: 2023-04-24 | End: 2023-04-24

## 2023-04-24 RX ORDER — FENTANYL CITRATE/PF 50 MCG/ML
25 SYRINGE (ML) INJECTION
Status: DISCONTINUED | OUTPATIENT
Start: 2023-04-24 | End: 2023-04-24 | Stop reason: HOSPADM

## 2023-04-24 RX ORDER — OXYCODONE HYDROCHLORIDE 5 MG/1
5 TABLET ORAL EVERY 4 HOURS PRN
Status: DISCONTINUED | OUTPATIENT
Start: 2023-04-24 | End: 2023-04-24 | Stop reason: HOSPADM

## 2023-04-24 RX ORDER — ALBUTEROL SULFATE 2.5 MG/3ML
2.5 SOLUTION RESPIRATORY (INHALATION) ONCE AS NEEDED
Status: DISCONTINUED | OUTPATIENT
Start: 2023-04-24 | End: 2023-04-24 | Stop reason: HOSPADM

## 2023-04-24 RX ORDER — SODIUM CHLORIDE, SODIUM LACTATE, POTASSIUM CHLORIDE, CALCIUM CHLORIDE 600; 310; 30; 20 MG/100ML; MG/100ML; MG/100ML; MG/100ML
INJECTION, SOLUTION INTRAVENOUS CONTINUOUS PRN
Status: DISCONTINUED | OUTPATIENT
Start: 2023-04-24 | End: 2023-04-24

## 2023-04-24 RX ORDER — PROPOFOL 10 MG/ML
INJECTION, EMULSION INTRAVENOUS CONTINUOUS PRN
Status: DISCONTINUED | OUTPATIENT
Start: 2023-04-24 | End: 2023-04-24

## 2023-04-24 RX ORDER — SODIUM CHLORIDE 9 MG/ML
INJECTION, SOLUTION INTRAVENOUS AS NEEDED
Status: DISCONTINUED | OUTPATIENT
Start: 2023-04-24 | End: 2023-04-24 | Stop reason: HOSPADM

## 2023-04-24 RX ORDER — KETOROLAC TROMETHAMINE 30 MG/ML
INJECTION, SOLUTION INTRAMUSCULAR; INTRAVENOUS AS NEEDED
Status: DISCONTINUED | OUTPATIENT
Start: 2023-04-24 | End: 2023-04-24

## 2023-04-24 RX ORDER — SODIUM CHLORIDE, SODIUM LACTATE, POTASSIUM CHLORIDE, CALCIUM CHLORIDE 600; 310; 30; 20 MG/100ML; MG/100ML; MG/100ML; MG/100ML
125 INJECTION, SOLUTION INTRAVENOUS CONTINUOUS
Status: DISCONTINUED | OUTPATIENT
Start: 2023-04-24 | End: 2023-04-24 | Stop reason: HOSPADM

## 2023-04-24 RX ORDER — PROMETHAZINE HYDROCHLORIDE 25 MG/ML
12.5 INJECTION, SOLUTION INTRAMUSCULAR; INTRAVENOUS ONCE AS NEEDED
Status: DISCONTINUED | OUTPATIENT
Start: 2023-04-24 | End: 2023-04-24 | Stop reason: HOSPADM

## 2023-04-24 RX ORDER — PROPOFOL 10 MG/ML
INJECTION, EMULSION INTRAVENOUS AS NEEDED
Status: DISCONTINUED | OUTPATIENT
Start: 2023-04-24 | End: 2023-04-24

## 2023-04-24 RX ORDER — ROCURONIUM BROMIDE 10 MG/ML
INJECTION, SOLUTION INTRAVENOUS AS NEEDED
Status: DISCONTINUED | OUTPATIENT
Start: 2023-04-24 | End: 2023-04-24

## 2023-04-24 RX ORDER — ONDANSETRON 2 MG/ML
4 INJECTION INTRAMUSCULAR; INTRAVENOUS ONCE AS NEEDED
Status: DISCONTINUED | OUTPATIENT
Start: 2023-04-24 | End: 2023-04-24 | Stop reason: HOSPADM

## 2023-04-24 RX ORDER — FENTANYL CITRATE 50 UG/ML
INJECTION, SOLUTION INTRAMUSCULAR; INTRAVENOUS AS NEEDED
Status: DISCONTINUED | OUTPATIENT
Start: 2023-04-24 | End: 2023-04-24

## 2023-04-24 RX ADMIN — ROCURONIUM BROMIDE 50 MG: 50 INJECTION INTRAVENOUS at 15:36

## 2023-04-24 RX ADMIN — DEXAMETHASONE SODIUM PHOSPHATE 10 MG: 10 INJECTION, SOLUTION INTRAMUSCULAR; INTRAVENOUS at 15:36

## 2023-04-24 RX ADMIN — SUGAMMADEX 200 MG: 100 INJECTION, SOLUTION INTRAVENOUS at 16:08

## 2023-04-24 RX ADMIN — SCOPOLAMINE 1 PATCH: 1 SYSTEM TRANSDERMAL at 14:35

## 2023-04-24 RX ADMIN — MIDAZOLAM 2 MG: 1 INJECTION INTRAMUSCULAR; INTRAVENOUS at 15:30

## 2023-04-24 RX ADMIN — FENTANYL CITRATE 50 MCG: 50 INJECTION INTRAMUSCULAR; INTRAVENOUS at 15:59

## 2023-04-24 RX ADMIN — PROPOFOL 150 MCG/KG/MIN: 10 INJECTION, EMULSION INTRAVENOUS at 15:36

## 2023-04-24 RX ADMIN — FENTANYL CITRATE 100 MCG: 50 INJECTION INTRAMUSCULAR; INTRAVENOUS at 15:36

## 2023-04-24 RX ADMIN — SODIUM CHLORIDE, SODIUM LACTATE, POTASSIUM CHLORIDE, AND CALCIUM CHLORIDE: .6; .31; .03; .02 INJECTION, SOLUTION INTRAVENOUS at 15:32

## 2023-04-24 RX ADMIN — CLINDAMYCIN PHOSPHATE 900 MG: 18 INJECTION, SOLUTION INTRAMUSCULAR; INTRAVENOUS at 15:39

## 2023-04-24 RX ADMIN — PROPOFOL 200 MG: 10 INJECTION, EMULSION INTRAVENOUS at 15:36

## 2023-04-24 RX ADMIN — LIDOCAINE HYDROCHLORIDE 100 MG: 20 INJECTION, SOLUTION EPIDURAL; INFILTRATION; INTRACAUDAL; PERINEURAL at 15:36

## 2023-04-24 RX ADMIN — KETOROLAC TROMETHAMINE 30 MG: 30 INJECTION, SOLUTION INTRAMUSCULAR at 16:04

## 2023-04-24 RX ADMIN — ONDANSETRON 4 MG: 2 INJECTION INTRAMUSCULAR; INTRAVENOUS at 15:36

## 2023-04-24 NOTE — ANESTHESIA PREPROCEDURE EVALUATION
Procedure:  CHOLECYSTECTOMY LAPAROSCOPIC (Abdomen)    Relevant Problems   ANESTHESIA   (+) PONV (postoperative nausea and vomiting)      NEURO/PSYCH   (+) Anxiety and depression        Physical Exam    Airway    Mallampati score: I  TM Distance: >3 FB  Neck ROM: full     Dental       Cardiovascular  Cardiovascular exam normal    Pulmonary  Pulmonary exam normal     Other Findings        Anesthesia Plan  ASA Score- 2     Anesthesia Type- general with ASA Monitors  Additional Monitors:   Airway Plan: ETT  Plan Factors-Exercise tolerance (METS): >4 METS  Chart reviewed  EKG reviewed  Imaging results reviewed  Existing labs reviewed  Patient summary reviewed  Induction- intravenous  Postoperative Plan- Plan for postoperative opioid use  Planned trial extubation    Informed Consent- Anesthetic plan and risks discussed with patient  I personally reviewed this patient with the CRNA  Discussed and agreed on the Anesthesia Plan with the CRNA  Paige Rogers

## 2023-04-24 NOTE — ANESTHESIA POSTPROCEDURE EVALUATION
Post-Op Assessment Note    CV Status:  Stable    Pain management: adequate     Mental Status:  Alert and awake   Hydration Status:  Euvolemic   PONV Controlled:  Controlled   Airway Patency:  Patent      Post Op Vitals Reviewed: Yes      Staff: CRNA         No notable events documented      /84 (04/24/23 1625)    Temp 97 9 °F (36 6 °C) (04/24/23 1625)    Pulse 101 (04/24/23 1625)   Resp 16 (04/24/23 1625)    SpO2 99 % (04/24/23 1625)

## 2023-04-24 NOTE — INTERVAL H&P NOTE
H&P reviewed  After examining the patient I find no changes in the patients condition since the H&P had been written      Vitals:    04/24/23 1252   BP: 111/60   Pulse: 82   Resp: 18   Temp: 98 2 °F (36 8 °C)   SpO2: 95%

## 2023-04-24 NOTE — OP NOTE
OPERATIVE REPORT  PATIENT NAME: Ludwig Fink    :  1980  MRN: [id number]  Pt [facility]    SURGERY DATE: 2023    Surgeon(s) and Role:     * Lupe Fischer DO - Primary     * Ethan Ramachandran MD - Assisting    Preop Diagnosis:  Chronic calculous cholecystitis [K80 10]    Post-Op Diagnosis Codes:     * Chronic calculous cholecystitis [K80 10]    Procedure(s):  CHOLECYSTECTOMY LAPAROSCOPIC    Specimen(s):  ID Type Source Tests Collected by Time Destination   1 : CC family/referring MD Tissue Gallbladder TISSUE EXAM Damian Heaton DO 2023 1552        Estimated Blood Loss:   Minimal    Drains:  * No LDAs found *    Anesthesia Type:   General/local    Operative Indications:  Chronic calculous cholecystitis [K80 10]      Operative Findings:  Changes consistent with chronic calculus cholecystitis  Height 65 inches weight 73 kg 161 pounds  BMI 26  ASA 2  Wound class II    Complications:   None    Procedure and Technique:  Patient was brought the operative suite and identified by visualization, conversation, by armband  Sequential compression pumps were placed  She was given clindamycin perioperatively  Once under anesthesia abdomen was then prepped and draped in a sterile fashion  Timeout was performed was assured that the prep was dry  Local was instilled at the supraumbilical fold  Small vertical skin incision was made and the subcutaneous tissues were bluntly dissected with Trini clamps down to the fascia  Fascia was lifted up and divided in the midline  Poked the underlying peritoneum gaining access into the abdominal cavity  An 11 mm trocar was placed under direct visualization  CO2 was attached crating a pneumoperitoneum 3 other 5 mm bladeless trocars were placed in the right upper quadrant  Gallbladder is lifted cephalad  Lateral retraction was utilized at the neck of the gallbladder    Careful dissection was carried out to identify both cystic duct and cystic artery structures  Critical view was visualized  Both structures divided to ligaclips  Gallbladder is then taken off the liver using variable speed the harmonic scalpel  There was excellent hemostasis  Gallbladder is placed into an Endo Catch bag  Irrigation was carried out  Pneumoperitoneum was evacuated  Gallbladder is then brought to the umbilical port site  Other trocars removed  Fascia at the umbilical port site was closing 0 Vicryl in a figure-of-eight fashion  Local was instilled  4-0 Monocryl was used to close skin incisions in a subicular fashion  Wounds were washed and dried  Sterile skin glue was applied  She was awakened in the operating room and returned to the recovery area in stable condition having tolerated the procedure well  I was present for the entire procedure      Patient Disposition:  PACU         SIGNATURE: Leta Oliver DO  DATE: April 24, 2023  TIME: 4:08 PM

## 2023-05-11 ENCOUNTER — OFFICE VISIT (OUTPATIENT)
Dept: SURGERY | Facility: CLINIC | Age: 43
End: 2023-05-11

## 2023-05-11 DIAGNOSIS — K80.10 CHRONIC CHOLECYSTITIS WITH CALCULUS: Primary | ICD-10-CM

## 2023-05-11 NOTE — PROGRESS NOTES
Assessment/Plan:    Diagnoses and all orders for this visit:    Chronic cholecystitis with calculus    Status post laparoscopic cholecystectomy  Doing quite well  May resume diet and activity as tolerated    Pathology: Reviewed with patient, all questions answered  Postoperative restrictions reviewed  All questions answered  ______________________________________________________  HPI: Patient presents post-operatively  Lap cholecystectomy 4/24/23  Final Diagnosis  A  Gallbladder (cholecystectomy):     - Minimal chronic cholecystitis, cholesterolosis and cholelithiasis; negative for malignancy  ROS:  General ROS: negative for - chills, fatigue, fever or night sweats, weight loss  Respiratory ROS: no cough, shortness of breath, or wheezing  Cardiovascular ROS: no chest pain or dyspnea on exertion  Genito-Urinary ROS: no dysuria, trouble voiding, or hematuria  Musculoskeletal ROS: negative for - gait disturbance, joint pain or muscle pain  Neurological ROS: no TIA or stroke symptoms  GI ROS: see HPI  Skin ROS: no new rashes or lesions   Lymphatic ROS: no new adenopathy noted by pt     GYN ROS: see HPI, no new GYN history or bleeding noted  Psy ROS: no new mental or behavioral disturbances         Patient Active Problem List   Diagnosis   • Endometriosis determined by laparoscopy   • Allergic rhinitis   • Anxiety and depression   • Elevated alkaline phosphatase level   • Vitamin D deficiency   • Gastroduodenitis   • PONV (postoperative nausea and vomiting)   • Thyroid nodule   • Insomnia   • Dyslipidemia   • Calculus of gallbladder without cholecystitis without obstruction   • Chronic cholecystitis with calculus       Allergies:  Cefprozil, Pertussis vaccine, Shellfish-derived products - food allergy, and Amoxicillin      Current Outpatient Medications:   •  escitalopram (LEXAPRO) 10 mg tablet, TAKE 1 TABLET BY MOUTH EVERY DAY, Disp: 90 tablet, Rfl: 2  •  fexofenadine (ALLEGRA) 180 MG "tablet, Take 180 mg by mouth if needed, Disp: , Rfl:   •  fluticasone (FLONASE) 50 mcg/act nasal spray, 2 sprays into each nostril daily (Patient taking differently: 2 sprays into each nostril if needed), Disp: 1 Bottle, Rfl: 0  •  pseudoephedrine (SUDAFED) 120 MG 12 hr tablet, Take 120 mg by mouth 2 (two) times a day PRN  , Disp: , Rfl:     Past Medical History:   Diagnosis Date   • Anxiety    • BRCA1 negative    • BRCA2 negative    • Chronic kidney disease     [id number]   • Depression    • Disease of thyroid gland     nodule   • Endometriosis    • Female infertility     iui with prior preg   • GERD (gastroesophageal reflux disease)    • Gestational diabetes    • Kidney stone    • PONV (postoperative nausea and vomiting)     gets \"very sick\"   • Renal calculi     last assessed 10/20/14; US 10/2014 3mm and 4mm right, non obstructing   • Varicella     childhood       Past Surgical History:   Procedure Laterality Date   • APPENDECTOMY     •  SECTION     • CYSTOSCOPY N/A 3/1/2021    Procedure: CYSTOSCOPY;  Surgeon: Tere Garcia DO;  [facility];  Service: Gynecology   • DIAGNOSTIC LAPAROSCOPY      multiple, last 10/2012, 10/2014   • LAPAROSCOPIC OVARIAN CYSTECTOMY     • LASER LAPAROSCOPY     • NASAL SEPTUM SURGERY     • WA  DELIVERY ONLY N/A 2017    Procedure:  SECTION () REPEAT;  Surgeon: Darryn Aguilar DO;  [facility];  Service: Obstetrics   • WA LAPAROSCOPY SUPRACERVICAL HYSTERECTOMY 250 GM/< N/A 3/1/2021    Procedure: L S H , BILATERAL SALPINGECTOMY, RIGHT OOPHORECTOMY;  Surgeon: Tere Garcia DO;  [facility];  Service: Gynecology   • WA LAPAROSCOPY SURG CHOLECYSTECTOMY N/A 2023    Procedure: CHOLECYSTECTOMY LAPAROSCOPIC;  Surgeon: Yaya Heaton DO;  [facility];  Service: General   • WA LIG/TRNSXJ FALOPIAN TUBE  DEL/ABDML SURG Bilateral 2017    Procedure: LIGATION/COAGULATION TUBAL;  Surgeon: " Michaell Buerger, DO;  Location: [facility];  Service: Obstetrics   • TONSILLECTOMY     • TUBAL LIGATION         Family History   Problem Relation Age of Onset   • Depression Mother    • Hypertension Mother    • Miscarriages / Stillbirths Mother    • Anxiety disorder Mother         NOS   • Heart defect Mother         aortic valve disorder   • Endometriosis Mother    • Hyperlipidemia Father         high cholesterol   • Breast cancer Sister 29   • Cancer Sister         breast   • Colon cancer Maternal Grandmother 76   • Cancer Maternal Grandmother         colon   • Depression Maternal Grandmother    • Dementia Maternal Grandmother    • Vision loss Maternal Grandmother    • Arthritis Paternal Grandmother    • Hearing loss Paternal Grandmother    • Heart disease Paternal Grandmother    • Stroke Paternal Grandmother    • Miscarriages / Stillbirths Paternal Grandmother    • Colon cancer Paternal Grandmother 69   • Alcohol abuse Maternal Uncle    • Breast cancer Maternal Aunt 58   • No Known Problems Maternal Aunt    • No Known Problems Maternal Aunt    • No Known Problems Paternal Aunt         reports that she has quit smoking  Her smoking use included cigarettes  She has never used smokeless tobacco  She reports that she does not drink alcohol and does not use drugs      PHYSICAL EXAM    LMP 02/01/2019 (Exact Date)     General: normal, cooperative, no distress  Abdominal: soft, nondistended or nontender  Incision: clean, dry, and intact and healing well      Melisa Stephens DO    Date: 5/11/2023 Time: 9:52 AM

## 2023-06-30 DIAGNOSIS — F32.A ANXIETY AND DEPRESSION: ICD-10-CM

## 2023-06-30 DIAGNOSIS — F41.9 ANXIETY AND DEPRESSION: ICD-10-CM

## 2023-06-30 RX ORDER — ESCITALOPRAM OXALATE 10 MG/1
TABLET ORAL
Qty: 30 TABLET | Refills: 8 | Status: SHIPPED | OUTPATIENT
Start: 2023-06-30

## 2023-07-30 DIAGNOSIS — F32.A ANXIETY AND DEPRESSION: ICD-10-CM

## 2023-07-30 DIAGNOSIS — F41.9 ANXIETY AND DEPRESSION: ICD-10-CM

## 2023-07-30 RX ORDER — ESCITALOPRAM OXALATE 10 MG/1
TABLET ORAL
Qty: 90 TABLET | Refills: 3 | Status: SHIPPED | OUTPATIENT
Start: 2023-07-30

## 2023-10-10 ENCOUNTER — AMB VIDEO VISIT (OUTPATIENT)
Dept: OTHER | Facility: HOSPITAL | Age: 43
End: 2023-10-10
Payer: COMMERCIAL

## 2023-10-10 DIAGNOSIS — J01.40 ACUTE NON-RECURRENT PANSINUSITIS: Primary | ICD-10-CM

## 2023-10-10 PROBLEM — K80.20 CALCULUS OF GALLBLADDER WITHOUT CHOLECYSTITIS WITHOUT OBSTRUCTION: Status: RESOLVED | Noted: 2023-03-27 | Resolved: 2023-10-10

## 2023-10-10 PROBLEM — R74.8 ELEVATED ALKALINE PHOSPHATASE LEVEL: Status: RESOLVED | Noted: 2018-11-15 | Resolved: 2023-10-10

## 2023-10-10 PROBLEM — K29.90 GASTRODUODENITIS: Status: RESOLVED | Noted: 2019-06-08 | Resolved: 2023-10-10

## 2023-10-10 PROBLEM — K80.10 CHRONIC CHOLECYSTITIS WITH CALCULUS: Status: RESOLVED | Noted: 2023-05-11 | Resolved: 2023-10-10

## 2023-10-10 PROCEDURE — 99212 OFFICE O/P EST SF 10 MIN: CPT | Performed by: PHYSICIAN ASSISTANT

## 2023-10-10 RX ORDER — OXYMETAZOLINE HYDROCHLORIDE 0.05 G/100ML
2 SPRAY NASAL 2 TIMES DAILY
Qty: 30 ML | Refills: 0 | Status: SHIPPED | OUTPATIENT
Start: 2023-10-10

## 2023-10-10 RX ORDER — PREDNISONE 20 MG/1
40 TABLET ORAL DAILY
Qty: 6 TABLET | Refills: 0 | Status: SHIPPED | OUTPATIENT
Start: 2023-10-10 | End: 2023-10-13

## 2023-10-10 NOTE — PROGRESS NOTES
Video Visit - Yojana Fink 41 y.o. female MRN: [id number]    REQUIRED DOCUMENTATION:         1. This service was provided via [employer]. 2. Provider located at [address] [place] [id number] [phone].  3. [facility]: Wilfredo Calvillo PA-C.  4. Identify all parties in room with [facility] during [facility] visit:  No one else  5. After connecting through televideo, patient was identified by name and date of birth. Patient was then informed that this was a Telemedicine visit and that the exam was being conducted confidentially over secure lines. My office door was closed. No one else was in the room. Patient acknowledged consent and understanding of privacy and security of the Telemedicine visit. I informed the patient that I have reviewed their record in Epic and presented the opportunity for them to ask any questions regarding the visit today. The patient agreed to participate. VITALS: Heart Rate: 94 BPM, Respiratory Rate: 16 RPM, Temperature 98.4° F, Blood Pressure Unavailable mmHg, Pulse Ox Unavailable % on RA    HPI  Recently returned from [place] on Wednesday. Friday started with sinus issues- sinus HA, pressure in ears, green nasal discharge, PND leading to slight cough. Taking sudafed, flonase, mucinex without any relief. Denies fevers, CP, SOB, NVD. Physical Exam  Constitutional:       General: She is not in acute distress. Appearance: Normal appearance. She is normal weight. She is not toxic-appearing. HENT:      Head: Normocephalic and atraumatic. Nose: No rhinorrhea. Comments: Sounds congested     Mouth/Throat:      Mouth: Mucous membranes are moist.   Eyes:      Conjunctiva/sclera: Conjunctivae normal.      Comments: glasses   Cardiovascular:      Rate and Rhythm: Normal rate. Pulmonary:      Effort: Pulmonary effort is normal. No respiratory distress. Breath sounds:  No wheezing (no gross audible wheeze through computer). Musculoskeletal:      Cervical back: Normal range of motion. Skin:     Findings: No rash (on face or neck). Neurological:      Mental Status: She is alert. Cranial Nerves: No dysarthria or facial asymmetry. Psychiatric:         Mood and Affect: Mood normal.         Behavior: Behavior normal.         Diagnoses and all orders for this visit:    Acute non-recurrent pansinusitis  -     predniSONE 20 mg tablet; Take 2 tablets (40 mg total) by mouth daily for 3 days  -     oxymetazoline (AFRIN) 0.05 % nasal spray; 2 sprays by Each Nare route 2 (two) times a day      Patient Instructions   As discussed, sinus infections are typically viral and will get better on their own in 7-10 days. You should try symptomatic relief in the meantime with OTC (Claritin or Zyrtec), Afrin up to 3 days then switch to Flonase, and Sudafed. You can also try sinus rinses with a neti pot or nasal lavage (be sure to use distilled water.) If your symptoms do not improve after 7-10 days, you may need antibiotics because it is more likely to be bacterial at that point. Follow-up with your primary care physician for recheck in 2-3 business days. Go to the ER for sudden severe headache, high fevers, change in vision, seizure activity or anything else that is concerning.       [facility] Phone number is [phone] if you need assistance or have further questions

## 2023-10-10 NOTE — PATIENT INSTRUCTIONS
As discussed, sinus infections are typically viral and will get better on their own in 7-10 days. You should try symptomatic relief in the meantime with OTC (Claritin or Zyrtec), Afrin up to 3 days then switch to Flonase, and Sudafed. You can also try sinus rinses with a neti pot or nasal lavage (be sure to use distilled water.) If your symptoms do not improve after 7-10 days, you may need antibiotics because it is more likely to be bacterial at that point. Follow-up with your primary care physician for recheck in 2-3 business days. Go to the ER for sudden severe headache, high fevers, change in vision, seizure activity or anything else that is concerning.       [facility] Phone number is [phone] if you need assistance or have further questions

## 2023-10-13 ENCOUNTER — OFFICE VISIT (OUTPATIENT)
Dept: URGENT CARE | Facility: CLINIC | Age: 43
End: 2023-10-13
Payer: COMMERCIAL

## 2023-10-13 VITALS
HEART RATE: 85 BPM | OXYGEN SATURATION: 95 % | BODY MASS INDEX: 26.52 KG/M2 | WEIGHT: 165 LBS | RESPIRATION RATE: 18 BRPM | HEIGHT: 66 IN | DIASTOLIC BLOOD PRESSURE: 80 MMHG | TEMPERATURE: 96.9 F | SYSTOLIC BLOOD PRESSURE: 119 MMHG

## 2023-10-13 DIAGNOSIS — J01.00 ACUTE NON-RECURRENT MAXILLARY SINUSITIS: Primary | ICD-10-CM

## 2023-10-13 PROCEDURE — 99213 OFFICE O/P EST LOW 20 MIN: CPT

## 2023-10-13 RX ORDER — AMOXICILLIN AND CLAVULANATE POTASSIUM 875; 125 MG/1; MG/1
1 TABLET, FILM COATED ORAL EVERY 12 HOURS SCHEDULED
Qty: 10 TABLET | Refills: 0 | Status: SHIPPED | OUTPATIENT
Start: 2023-10-13 | End: 2023-10-18

## 2023-10-13 NOTE — PROGRESS NOTES
[facility]        NAME: Beth Panchal is a 41 y.o. female  : 1980    MRN: [id number]  DATE: 2023  TIME: 3:25 PM    Assessment and Plan   Acute non-recurrent maxillary sinusitis [J01.00]  1. Acute non-recurrent maxillary sinusitis  amoxicillin-clavulanate (AUGMENTIN) 875-125 mg per tablet          - Pt denies true allergy to Amoxicillin. Admits she has gotten a yeast infection from it. Has taken Augmentin in the past without issues. Patient Instructions       Follow up with PCP in 3-5 days. Proceed to  ER if symptoms worsen. Chief Complaint     Chief Complaint   Patient presents with    Sinusitis     Sinus infection for 1 week. Did a virtual visit and was given prednisone. Patient finished prednisone 3 day course. Feel like it is getting worse. Mucus is still present. Feels like it is going into the chest         History of Present Illness       40 y/o F with PMHx of seasonal allergies, who presents for sinus complaints x 10 days. Pt admits she returned from [place] at that time. Starting using Sudafed and Flonase. Admits she had telehealth visit on Tuesday due to worsening of symptoms. Was given steroid to use twice a day and Afrin nasal spray. Pt admits due to worsening of symptoms and feeling as if it is spreading to her chest.         Review of Systems   Review of Systems   Constitutional:  Negative for chills and fever. HENT:  Positive for congestion, postnasal drip, rhinorrhea, sinus pressure and sinus pain. Negative for ear pain and sore throat. Respiratory:  Positive for cough.           Current Medications       Current Outpatient Medications:     amoxicillin-clavulanate (AUGMENTIN) 875-125 mg per tablet, Take 1 tablet by mouth every 12 (twelve) hours for 5 days, Disp: 10 tablet, Rfl: 0    escitalopram (LEXAPRO) 10 mg tablet, TAKE 1 TABLET BY MOUTH EVERY DAY, Disp: 90 tablet, Rfl: 3    fexofenadine (ALLEGRA) 180 MG tablet, Take 180 mg by mouth if needed, Disp: , Rfl:     fluticasone (FLONASE) 50 mcg/act nasal spray, 2 sprays into each nostril daily (Patient taking differently: 2 sprays into each nostril if needed), Disp: 1 Bottle, Rfl: 0    oxymetazoline (AFRIN) 0.05 % nasal spray, 2 sprays by Each Nare route 2 (two) times a day, Disp: 30 mL, Rfl: 0    pseudoephedrine (SUDAFED) 120 MG 12 hr tablet, Take 120 mg by mouth 2 (two) times a day PRN  , Disp: , Rfl:     predniSONE 20 mg tablet, Take 2 tablets (40 mg total) by mouth daily for 3 days (Patient not taking: Reported on 10/13/2023), Disp: 6 tablet, Rfl: 0    Current Allergies     Allergies as of 10/13/2023 - Reviewed 10/13/2023   Allergen Reaction Noted    Cefprozil Shortness Of Breath 05/15/2017    Pertussis vaccine      Shellfish-derived products - food allergy Hives 2017    Amoxicillin  2017            The following portions of the patient's history were reviewed and updated as appropriate: allergies, current medications, past family history, past medical history, past social history, past surgical history and problem list.     Past Medical History:   Diagnosis Date    Anxiety     BRCA1 negative     BRCA2 negative     Chronic kidney disease     [id number]    Depression     Disease of thyroid gland     nodule    Endometriosis     Female infertility     iui with prior preg    Gastroduodenitis 2019    GERD (gastroesophageal reflux disease)     Gestational diabetes 2015    Kidney stone     PONV (postoperative nausea and vomiting)     gets "very sick"    Renal calculi     last assessed 10/20/14; US 10/2014 3mm and 4mm right, non obstructing    Varicella     childhood       Past Surgical History:   Procedure Laterality Date    APPENDECTOMY       SECTION      CYSTOSCOPY N/A 3/1/2021    Procedure: CYSTOSCOPY;  Surgeon: Cornell Lucero DO;  [facility];  Service: Gynecology    DIAGNOSTIC LAPAROSCOPY      multiple, last 10/2012, 10/2014    LAPAROSCOPIC OVARIAN CYSTECTOMY      LASER LAPAROSCOPY  2014    NASAL SEPTUM SURGERY      NM  DELIVERY ONLY N/A 2017    Procedure:  SECTION () REPEAT;  Surgeon: Valentine Mercado DO;  [facility];  Service: Obstetrics    NM LAPAROSCOPY SUPRACERVICAL HYSTERECTOMY 250 GM/< N/A 3/1/2021    Procedure: L.S.H., BILATERAL SALPINGECTOMY, RIGHT OOPHORECTOMY;  Surgeon: Dorethea Claude, DO;  [facility];  Service: Gynecology    NM LAPAROSCOPY SURG CHOLECYSTECTOMY N/A 2023    Procedure: CHOLECYSTECTOMY LAPAROSCOPIC;  Surgeon: Jamie Heaton DO;  [facility];  Service: General    NM LIG/TRNSXJ FALOPIAN TUBE  DEL/ABDML SURG Bilateral 2017    Procedure: LIGATION/COAGULATION TUBAL;  Surgeon: Valentine Mercado DO;  [facility];  Service: Obstetrics    TONSILLECTOMY      TUBAL LIGATION         Family History   Problem Relation Age of Onset    Depression Mother     Hypertension Mother     Miscarriages / [place] Mother     Anxiety disorder Mother         NOS    Heart defect Mother         aortic valve disorder    Endometriosis Mother     Hyperlipidemia Father         high cholesterol    Breast cancer Sister 29    Cancer Sister         breast    Colon cancer Maternal Grandmother 76    Cancer Maternal Grandmother         colon    Depression Maternal Grandmother     Dementia Maternal Grandmother     Vision loss Maternal Grandmother     Arthritis Paternal Grandmother     Hearing loss Paternal Grandmother     Heart disease Paternal Grandmother     Stroke Paternal Grandmother     Miscarriages / Stillbirths Paternal Grandmother     Colon cancer Paternal Grandmother 67    Alcohol abuse Maternal Uncle     Breast cancer Maternal Aunt 58    No Known Problems Maternal Aunt     No Known Problems Maternal Aunt     No Known Problems Paternal Aunt          Medications have been verified.         Objective   /80   Pulse 85   Temp (!) 96.9 °F (36.1 °C)   Resp 18   Ht 5' 6" (1.676 m)   Wt 74.8 kg (165 lb)   LMP 02/01/2019 (Exact Date)   SpO2 95%   BMI 26.63 kg/m²   Patient's last menstrual period was 02/01/2019 (exact date). Physical Exam     Physical Exam  Vitals and nursing note reviewed. Constitutional:       General: She is not in acute distress. Appearance: She is not toxic-appearing. HENT:      Head: Normocephalic and atraumatic. Comments: Maxillary sinus TTP     Right Ear: Tympanic membrane, ear canal and external ear normal.      Left Ear: Tympanic membrane, ear canal and external ear normal.      Nose: Rhinorrhea present. Mouth/Throat:      Mouth: Mucous membranes are moist.   Eyes:      Conjunctiva/sclera: Conjunctivae normal.   Pulmonary:      Effort: Pulmonary effort is normal.   Musculoskeletal:      Cervical back: No tenderness. Lymphadenopathy:      Cervical: No cervical adenopathy. Neurological:      Mental Status: She is alert.    Psychiatric:         Mood and Affect: Mood normal.         Behavior: Behavior normal.

## 2023-11-13 ENCOUNTER — OFFICE VISIT (OUTPATIENT)
Dept: FAMILY MEDICINE CLINIC | Facility: CLINIC | Age: 43
End: 2023-11-13
Payer: COMMERCIAL

## 2023-11-13 VITALS
WEIGHT: 166 LBS | HEART RATE: 87 BPM | HEIGHT: 66 IN | TEMPERATURE: 98 F | SYSTOLIC BLOOD PRESSURE: 140 MMHG | RESPIRATION RATE: 16 BRPM | DIASTOLIC BLOOD PRESSURE: 90 MMHG | BODY MASS INDEX: 26.68 KG/M2 | OXYGEN SATURATION: 98 %

## 2023-11-13 DIAGNOSIS — G44.209 TENSION HEADACHE: ICD-10-CM

## 2023-11-13 DIAGNOSIS — B36.0 TINEA VERSICOLOR: ICD-10-CM

## 2023-11-13 DIAGNOSIS — Z23 INFLUENZA VACCINE NEEDED: ICD-10-CM

## 2023-11-13 DIAGNOSIS — R39.9 URINARY TRACT INFECTION SYMPTOMS: Primary | ICD-10-CM

## 2023-11-13 LAB
SL AMB  POCT GLUCOSE, UA: NEGATIVE
SL AMB LEUKOCYTE ESTERASE,UA: ABNORMAL
SL AMB POCT BILIRUBIN,UA: NEGATIVE
SL AMB POCT BLOOD,UA: ABNORMAL
SL AMB POCT CLARITY,UA: CLEAR
SL AMB POCT COLOR,UA: YELLOW
SL AMB POCT KETONES,UA: NEGATIVE
SL AMB POCT NITRITE,UA: NEGATIVE
SL AMB POCT PH,UA: 6
SL AMB POCT SPECIFIC GRAVITY,UA: 1.03
SL AMB POCT URINE PROTEIN: ABNORMAL
SL AMB POCT UROBILINOGEN: NEGATIVE

## 2023-11-13 PROCEDURE — 87086 URINE CULTURE/COLONY COUNT: CPT | Performed by: NURSE PRACTITIONER

## 2023-11-13 PROCEDURE — 90471 IMMUNIZATION ADMIN: CPT | Performed by: NURSE PRACTITIONER

## 2023-11-13 PROCEDURE — 90686 IIV4 VACC NO PRSV 0.5 ML IM: CPT | Performed by: NURSE PRACTITIONER

## 2023-11-13 PROCEDURE — 99214 OFFICE O/P EST MOD 30 MIN: CPT | Performed by: NURSE PRACTITIONER

## 2023-11-13 PROCEDURE — 81002 URINALYSIS NONAUTO W/O SCOPE: CPT | Performed by: NURSE PRACTITIONER

## 2023-11-13 RX ORDER — NITROFURANTOIN 25; 75 MG/1; MG/1
100 CAPSULE ORAL 2 TIMES DAILY
Qty: 10 CAPSULE | Refills: 0 | Status: SHIPPED | OUTPATIENT
Start: 2023-11-13 | End: 2023-11-18

## 2023-11-13 RX ORDER — METHOCARBAMOL 750 MG/1
750 TABLET, FILM COATED ORAL
Qty: 30 TABLET | Refills: 0 | Status: SHIPPED | OUTPATIENT
Start: 2023-11-13

## 2023-11-13 RX ORDER — KETOCONAZOLE 20 MG/G
CREAM TOPICAL DAILY
Qty: 60 G | Refills: 0 | Status: SHIPPED | OUTPATIENT
Start: 2023-11-13

## 2023-11-13 NOTE — PROGRESS NOTES
FAMILY PRACTICE OFFICE VISIT       NAME: Vidya Fink  AGE: 41 y.o. SEX: female       : 1980        MRN: [id number]    Assessment and Plan   1. Urinary tract infection symptoms  Start Macrobid pending urine culture results. Call for any persisting or worsening of symptoms.   -     POCT urine dip  -     Urine culture  -     nitrofurantoin (MACROBID) 100 mg capsule; Take 1 capsule (100 mg total) by mouth 2 (two) times a day for 5 days    2. Tinea versicolor  Start Nizoral cream, use cream consistently for 3 weeks, call if rash is persistent. -     ketoconazole (NIZORAL) 2 % cream; Apply topically daily    3. Tension headache  Trial methocarbamol for tension headaches, neck pain at bedtime as needed, related to multiple stressors. Expecting work stress to decrease in the next 2 weeks. She will call if headaches, neck pain are persisting.   -     methocarbamol (Robaxin-750) 750 mg tablet; Take 1 tablet (750 mg total) by mouth daily at bedtime as needed for muscle spasms (Neck pain, tension headaches)    4. Influenza vaccine needed  -     influenza vaccine, quadrivalent, 0.5 mL, preservative-free, for adult and pediatric patients 6 mos+ (AFLURIA, FLUARIX, FLULAVAL, FLUZONE)         Chief Complaint     Chief Complaint   Patient presents with    Urinary Tract Infection     Since this morning. History of Present Illness     Pawel Hess is a 41year old female presenting today for UTI symptoms. Symptoms started today, when she woke up. Hurts to void  Tinge of blood on toilet tissue with wiping. Frequency and urgency. Mild pelvic discomfort. Slight back pain, but not necessarily abnormal back pain. No nausea, vomiting, fevers, chills. Tension headache and neck pain for the last 1.5 weeks. Work is very stressful now, kids are busy in activities, and with school. Not sleeping well. Work expected to slow down in the next 2 weeks.      Just had sinusitis, treated with amoxicillin at urgent care with subsequent vaginal yeast infection. Review of Systems   Review of Systems   Constitutional:  Positive for fatigue (due to poor sleeping right now.). HENT: Negative. Respiratory: Negative. Cardiovascular: Negative. Gastrointestinal: Negative. Genitourinary:  Positive for dysuria, flank pain (slight), frequency, hematuria, pelvic pain and urgency. Musculoskeletal:  Positive for neck pain. Neurological:  Positive for headaches. I have reviewed the patient's medical history in detail; there are no changes to the history as noted in the electronic medical record. Objective     Vitals:    11/13/23 1125   BP: 140/90   BP Location: Left arm   Patient Position: Sitting   Cuff Size: Standard   Pulse: 87   Resp: 16   Temp: 98 °F (36.7 °C)   TempSrc: Temporal   SpO2: 98%   Weight: 75.3 kg (166 lb)   Height: 5' 6" (1.676 m)     Wt Readings from Last 3 Encounters:   11/13/23 75.3 kg (166 lb)   10/13/23 74.8 kg (165 lb)   04/24/23 73 kg (161 lb)     Physical Exam  Vitals and nursing note reviewed. Constitutional:       General: She is not in acute distress. Appearance: Normal appearance. She is not ill-appearing. HENT:      Head: Atraumatic. Cardiovascular:      Rate and Rhythm: Normal rate and regular rhythm. Heart sounds: No murmur heard. Pulmonary:      Effort: Pulmonary effort is normal.      Breath sounds: Normal breath sounds. Abdominal:      General: Bowel sounds are normal.      Palpations: Abdomen is soft. Tenderness: There is no abdominal tenderness. There is no right CVA tenderness or left CVA tenderness. Musculoskeletal:      Right lower leg: No edema. Left lower leg: No edema. Skin:     Comments: Rash on anterior chest, neck, and posterior neck--flat, hypopigmented round spots of rash scattered, no scaling/flaking. Neurological:      General: No focal deficit present.       Mental Status: She is alert and oriented to person, place, and time. Psychiatric:         Mood and Affect: Mood normal.            ALLERGIES:  Allergies   Allergen Reactions    Cefprozil Shortness Of Breath    Pertussis Vaccine     Shellfish-Derived Products - Food Allergy Hives    Amoxicillin      Thrush,yeast infection       Current Medications     Current Outpatient Medications   Medication Sig Dispense Refill    escitalopram (LEXAPRO) 10 mg tablet TAKE 1 TABLET BY MOUTH EVERY DAY 90 tablet 3    fexofenadine (ALLEGRA) 180 MG tablet Take 180 mg by mouth if needed      fluticasone (FLONASE) 50 mcg/act nasal spray 2 sprays into each nostril daily (Patient taking differently: 2 sprays into each nostril if needed) 1 Bottle 0    ketoconazole (NIZORAL) 2 % cream Apply topically daily 60 g 0    methocarbamol (Robaxin-750) 750 mg tablet Take 1 tablet (750 mg total) by mouth daily at bedtime as needed for muscle spasms (Neck pain, tension headaches) 30 tablet 0    nitrofurantoin (MACROBID) 100 mg capsule Take 1 capsule (100 mg total) by mouth 2 (two) times a day for 5 days 10 capsule 0    oxymetazoline (AFRIN) 0.05 % nasal spray 2 sprays by Each Nare route 2 (two) times a day 30 mL 0    pseudoephedrine (SUDAFED) 120 MG 12 hr tablet Take 120 mg by mouth 2 (two) times a day PRN         No current facility-administered medications for this visit.          Health Maintenance     Health Maintenance   Topic Date Due    Hepatitis C Screening  Never done    DTaP,Tdap,and Td Vaccines (6 - Tdap) 08/29/2018    COVID-19 Vaccine (4 - Pfizer series) 02/09/2022    Annual Physical  02/02/2023    BMI: Followup Plan  02/05/2023    Breast Cancer Screening: Mammogram  08/01/2023    BMI: Adult  11/13/2024    Cervical Cancer Screening  01/31/2027    HIV Screening  Completed    IPV Vaccine  Completed    Meningococcal ACWY Vaccine  Completed    Influenza Vaccine  Completed    Pneumococcal Vaccine: Pediatrics (0 to 5 Years) and At-Risk Patients (6 to 59 Years)  Aged Out    HIB Vaccine Aged Out    Hepatitis A Vaccine  Aged Out    HPV Vaccine  Aged Out     Immunization History   Administered Date(s) Administered    COVID-19 PFIZER VACCINE 0.3 ML IM 04/13/2021, 05/04/2021, 12/15/2021    DT (pediatric) 07/16/1984, 10/11/1993    DTaP 1982, 1982, 1982    DTaP 5 1982, 1982, 1982, 03/28/1983, 08/29/2008    INFLUENZA 10/10/2015, 10/08/2016, 10/17/2017    IPV 01/03/1983, 01/28/1983, 04/25/1983, 07/16/1984, 10/01/1987    Influenza Quadrivalent Preservative Free 3 years and older IM 10/10/2015, 10/08/2016    Influenza, Quadrivalent (nasal) 10/10/2015, 10/08/2016    Influenza, injectable, quadrivalent, preservative free 0.5 mL 10/29/2018, 10/08/2019, 10/24/2020, 11/15/2021, 11/13/2023    MMR 11/23/1983, 10/11/1993    Meningococcal MCV4, Unspecified 08/02/2000, 08/29/2008    Meningococcal MCV4P 08/02/2000, 08/29/2008    Meningococcal, Unknown Serogroups 08/02/2000, 08/29/2008    Mumps 08/22/1983    Rubella 11/23/1983, 09/21/1984    Tuberculin Skin Test-PPD Intradermal 02/06/2012       AQUILES Mike

## 2023-11-14 LAB — BACTERIA UR CULT: NORMAL

## 2023-11-15 ENCOUNTER — TELEPHONE (OUTPATIENT)
Dept: FAMILY MEDICINE CLINIC | Facility: CLINIC | Age: 43
End: 2023-11-15

## 2023-11-15 NOTE — TELEPHONE ENCOUNTER
----- Message from Stanley Hill sent at 11/14/2023  9:59 PM EST -----  Urine culture shows mixed contaminants, no specific bacteria growing. If you are feeling better on Macrobid, finish the course. Please call for any persisting symptoms.

## 2024-01-02 ENCOUNTER — ANNUAL EXAM (OUTPATIENT)
Dept: OBGYN CLINIC | Facility: CLINIC | Age: 44
End: 2024-01-02
Payer: COMMERCIAL

## 2024-01-02 VITALS
DIASTOLIC BLOOD PRESSURE: 82 MMHG | SYSTOLIC BLOOD PRESSURE: 116 MMHG | WEIGHT: 165 LBS | BODY MASS INDEX: 26.52 KG/M2 | HEIGHT: 66 IN

## 2024-01-02 DIAGNOSIS — Z12.31 ENCOUNTER FOR SCREENING MAMMOGRAM FOR BREAST CANCER: ICD-10-CM

## 2024-01-02 DIAGNOSIS — B37.31 VAGINAL YEAST INFECTION: ICD-10-CM

## 2024-01-02 DIAGNOSIS — N75.1 BARTHOLIN'S GLAND ABSCESS: Primary | ICD-10-CM

## 2024-01-02 DIAGNOSIS — Z01.419 WOMEN'S ANNUAL ROUTINE GYNECOLOGICAL EXAMINATION: ICD-10-CM

## 2024-01-02 PROCEDURE — S0612 ANNUAL GYNECOLOGICAL EXAMINA: HCPCS | Performed by: OBSTETRICS & GYNECOLOGY

## 2024-01-02 PROCEDURE — 56420 I&D BARTHOLINS GLAND ABSCESS: CPT | Performed by: OBSTETRICS & GYNECOLOGY

## 2024-01-02 PROCEDURE — 87070 CULTURE OTHR SPECIMN AEROBIC: CPT | Performed by: OBSTETRICS & GYNECOLOGY

## 2024-01-02 RX ORDER — METRONIDAZOLE 500 MG/1
TABLET ORAL
Qty: 8 TABLET | Refills: 0 | Status: SHIPPED | OUTPATIENT
Start: 2024-01-02 | End: 2024-01-03

## 2024-01-02 RX ORDER — FLUCONAZOLE 200 MG/1
TABLET ORAL
Qty: 2 TABLET | Refills: 2 | Status: SHIPPED | OUTPATIENT
Start: 2024-01-02 | End: 2024-01-03

## 2024-01-02 NOTE — PATIENT INSTRUCTIONS
Patient was informed of a stable GYN examination.  Except for the presence of the Bartholin cyst.  Her pressure sensation on her external genitalia felt much better after incision and drainage of the Bartholin abscess.  She will watch out for fever chills excessive bleeding.  She is return my office in 1 week for follow-up.  Sutures were placed.  She will continue getting her yearly mammograms.  She is much happier since her hysterectomy.

## 2024-01-02 NOTE — PROGRESS NOTES
Assessment/Plan:    Patient was informed the presence of the right Bartholin cyst.  She agreed gave consent for incision and drainage.  I&D the more often cyst was taken.  She was started on metronidazole and antibiotic for 100 mg twice daily for the next 5 days.  She will get ice for the pain.  She will avoid intercourse.  If fever chills and she will contact me.  The rest of her pelvic exam was benign.  A Pap smear was not performed.  She will continue to get yearly mammograms.  She is content with her weight.  Denies any prior depression, she does have history of anxiety which is controlled with Lexapro.  She will return to my office in 1 week for follow-up for the plan of an abscess.         Subjective:      Patient ID: Malou Fink is a 43 y.o. female.    HPI    This is a 43-year-old female, she is a  6 para 2 with 3 living children.  She had 2  sections.  The first  section was for twins.  The second was a repeat.  She has a history of a tubal ligation.  She is approximately 3 years status post laparoscopic hysterectomy with a white oophorectomy for endometriosis.  It was a supracervical hysterectomy.  She is very happy the results and there is no more pain with discomfort with her menstrual cycles or dyspareunia.  She states her sex life is better.  There is no major  or GI complaint.  She is content with her weight.  Does have a history of anxiety is on Lexapro which is working well.  She will continue to get yearly mammograms.  She does not need a Pap smear today.  There is no new major family illnesses to report.  She is content with her weight is also complaining of probable Bartholin cyst on her right side.          The following portions of the patient's history were reviewed and updated as appropriate: allergies, current medications, past family history, past medical history, past social history, past surgical history, and problem list.    Review of Systems   All other  "systems reviewed and are negative.        Objective:      /82   Ht 5' 6\" (1.676 m)   Wt 74.8 kg (165 lb)   LMP 2019 (Exact Date)   BMI 26.63 kg/m²          Physical Exam  Vitals reviewed.   Constitutional:       Appearance: Normal appearance. She is normal weight.   HENT:      Head: Normocephalic and atraumatic.      Mouth/Throat:      Mouth: Mucous membranes are moist.   Eyes:      Pupils: Pupils are equal, round, and reactive to light.   Cardiovascular:      Rate and Rhythm: Normal rate and regular rhythm.      Pulses: Normal pulses.      Heart sounds: Normal heart sounds.   Pulmonary:      Effort: Pulmonary effort is normal.      Breath sounds: Normal breath sounds.   Chest:   Breasts:     Breasts are symmetrical.      Right: Normal.      Left: Normal.   Abdominal:      General: Abdomen is flat. A surgical scar is present. Bowel sounds are normal.      Palpations: There is no hepatomegaly or splenomegaly.      Tenderness: There is no abdominal tenderness.      Hernia: No hernia is present.          Comments:  section scar well-healed x 2   Genitourinary:     Vagina: Normal.      Cervix: Normal.      Uterus: Absent.       Adnexa: Right adnexa normal and left adnexa normal.      Rectum: Normal.          Comments: Right Bartholin cyst greatest diameter 6 cm.  Very tender.  The vagina is clean and cervix is closed.  A Pap smear was not performed. there is no cervical motion tenderness there is no evidence of prolapse.  The urethra and bladder normal working relationship.  The patient is very uncomfortable and she gave permission for incision and drainage of Bartholin cyst.  This of the uterus has been surgically removed.  Musculoskeletal:         General: Normal range of motion.      Cervical back: Normal range of motion and neck supple.   Lymphadenopathy:      Upper Body:      Right upper body: No supraclavicular adenopathy.      Left upper body: No supraclavicular adenopathy.   Skin:     " General: Skin is warm and dry.   Neurological:      General: No focal deficit present.      Mental Status: She is alert and oriented to person, place, and time.   Psychiatric:         Mood and Affect: Mood normal.         Behavior: Behavior normal.     Incision and drain    Date/Time: 1/2/2024 2:00 PM    Performed by: Vitaliy Ramsey MD  Authorized by: Vitaliy Ramsey MD  Universal Protocol:  Consent: Verbal consent obtained.  Risks and benefits: risks, benefits and alternatives were discussed  Consent given by: patient  Timeout called at: 1/2/2024 2:30 PM.  Patient understanding: patient states understanding of the procedure being performed  Patient consent: the patient's understanding of the procedure matches consent given  Procedure consent: procedure consent matches procedure scheduled  Relevant documents: relevant documents present and verified  Site marked: the operative site was not marked  Radiology Images displayed and confirmed. If images not available, report reviewed: imaging studies not available  Patient identity confirmed: verbally with patient    Location:     Type:  Abscess and Bartholin cyst    Size:  Diameter 6 cm    Location:  Anogenital    Anogenital location:  Bartholin's gland  Pre-procedure details:     Skin preparation:  Antiseptic wash and Betadine  Procedure details:     Complexity:  Simple    Incision types:  Stab incision    Scalpel blade:  15    Incision depth:  Subcutaneous    Wound management:  Probed and deloculated    Drainage:  Purulent    Drainage amount:  Copious    Wound treatment:  Wound left open    Packing materials:  None  Post-procedure details:     Patient tolerance of procedure:  Tolerated well, no immediate complications    Complication (if applicable):  None  Comments:      Patient consented to incision and drainage of right Bartholin abscess.  The area was cleaned with Betadine.  Infiltrated with 1% lidocaine without.  A stab incision was made copious amount of gray  purulent material was drained.  There was no evidence of a bad odor.  Cultures were taken.  The stab site lose a little bit but was clean and purulent.  A suture of 3-0 Vicryl was used for hemostasis of the stab incision.  The patient was started on antibiotics including metronidazole.  She return to my office in 1 week for follow-up she will keep me informed of any fever or chills or discomfort.

## 2024-01-06 LAB — BACTERIA GENITAL AEROBE CULT: NO GROWTH

## 2024-01-09 ENCOUNTER — OFFICE VISIT (OUTPATIENT)
Dept: OBGYN CLINIC | Facility: CLINIC | Age: 44
End: 2024-01-09

## 2024-01-09 VITALS
WEIGHT: 168.4 LBS | HEIGHT: 66 IN | BODY MASS INDEX: 27.06 KG/M2 | DIASTOLIC BLOOD PRESSURE: 78 MMHG | SYSTOLIC BLOOD PRESSURE: 118 MMHG

## 2024-01-09 DIAGNOSIS — N75.1 BARTHOLIN'S GLAND ABSCESS: Primary | ICD-10-CM

## 2024-01-09 PROCEDURE — 99024 POSTOP FOLLOW-UP VISIT: CPT | Performed by: OBSTETRICS & GYNECOLOGY

## 2024-01-09 NOTE — PROGRESS NOTES
This is a 43-year-old white female with a history of a Bartholin abscess which we I&D last week in the office.  It was a sterile abscess cultures were negative.  She feels much better.  She has not resumed sexual activity.  She is doing well with suture bleeding was well-controlled.    Inspection of site we were unable to express any more pus out of the opening.  The sutures that were there were excised without difficulty.  She will continue to watch.  This been much improved and she is no longer having any pain.  She return my office on a as needed basis.

## 2024-01-09 NOTE — PATIENT INSTRUCTIONS
The patient was seen today for follow-up of a right Bartholin abscess.  She feels much better there is been no drainage.  No fever or chills.  No problem with voiding.  Inspection shows that the Bartholin abscess has not reoccurred.  The sutures were removed.  Will keep informed of her progress return the office on a as needed basis.

## 2024-01-29 ENCOUNTER — HOSPITAL ENCOUNTER (EMERGENCY)
Facility: HOSPITAL | Age: 44
Discharge: HOME | End: 2024-01-29
Attending: EMERGENCY MEDICINE
Payer: COMMERCIAL

## 2024-01-29 VITALS
HEART RATE: 79 BPM | RESPIRATION RATE: 18 BRPM | OXYGEN SATURATION: 98 % | SYSTOLIC BLOOD PRESSURE: 136 MMHG | DIASTOLIC BLOOD PRESSURE: 102 MMHG | TEMPERATURE: 98 F

## 2024-01-29 DIAGNOSIS — N75.0 BARTHOLIN GLAND CYST: Primary | ICD-10-CM

## 2024-01-29 PROCEDURE — 99284 EMERGENCY DEPT VISIT MOD MDM: CPT | Performed by: EMERGENCY MEDICINE

## 2024-01-29 PROCEDURE — 99282 EMERGENCY DEPT VISIT SF MDM: CPT

## 2024-01-29 PROCEDURE — 56420 I&D BARTHOLINS GLAND ABSCESS: CPT | Performed by: EMERGENCY MEDICINE

## 2024-01-29 RX ORDER — LIDOCAINE HYDROCHLORIDE 10 MG/ML
5 INJECTION, SOLUTION EPIDURAL; INFILTRATION; INTRACAUDAL; PERINEURAL ONCE
Status: COMPLETED | OUTPATIENT
Start: 2024-01-29 | End: 2024-01-29

## 2024-01-29 RX ADMIN — LIDOCAINE HYDROCHLORIDE 5 ML: 10 INJECTION, SOLUTION EPIDURAL; INFILTRATION; INTRACAUDAL; PERINEURAL at 22:54

## 2024-01-30 NOTE — ED ATTENDING ATTESTATION
1/29/2024  I, Margo Hart DO, saw and evaluated the patient. I have discussed the patient with the resident/non-physician practitioner and agree with the resident's/non-physician practitioner's findings, Plan of Care, and MDM as documented in the resident's/non-physician practitioner's note, except where noted. All available labs and Radiology studies were reviewed.  I was present for key portions of any procedure(s) performed by the resident/non-physician practitioner and I was immediately available to provide assistance.       At this point I agree with the current assessment done in the Emergency Department.  I have conducted an independent evaluation of this patient a history and physical is as follows:    History  Patient is a 43 y.o. year old female who presents for evaluation with a right labial abscess.  Per chart review, patient had a Bartholin gland cyst on the right labia which was drained 3 weeks ago by her gynecologist.  Patient reports that the cyst has since returned, and she is endorsing pain and swelling in the right labia in the same location as her previous abscess.  She denies any drainage from the area.  She denies any associated fevers, chills, or difficulty urinating.    Current Outpatient Medications   Medication Instructions    escitalopram (LEXAPRO) 10 mg tablet TAKE 1 TABLET BY MOUTH EVERY DAY    fexofenadine (ALLEGRA) 180 mg, Oral, As needed    fluticasone (FLONASE) 50 mcg/act nasal spray 2 sprays, Nasal, Daily    ketoconazole (NIZORAL) 2 % cream Topical, Daily    methocarbamol (ROBAXIN-750) 750 mg, Oral, Daily at bedtime PRN    oxymetazoline (AFRIN) 0.05 % nasal spray 2 sprays, Each Nare, 2 times daily    pseudoephedrine (SUDAFED) 120 mg, 2 times daily     Past Medical History:   Diagnosis Date    Anxiety     BRCA1 negative     BRCA2 negative     Chronic kidney disease     [id number]    Depression     Disease of thyroid gland     nodule    Endometriosis     Female infertility     iui  "with prior preg    Gastroduodenitis 2019    GERD (gastroesophageal reflux disease)     Gestational diabetes     Kidney stone     PONV (postoperative nausea and vomiting)     gets \"very sick\"    Renal calculi     last assessed 10/20/14; US 10/2014 3mm and 4mm right, non obstructing    Varicella     childhood     Past Surgical History:   Procedure Laterality Date    APPENDECTOMY       SECTION      CYSTOSCOPY N/A 3/1/2021    Procedure: CYSTOSCOPY;  Surgeon: Eliud Burch DO;  Location: [facility];  Service: Gynecology    DIAGNOSTIC LAPAROSCOPY      multiple, last 10/2012, 10/2014    LAPAROSCOPIC OVARIAN CYSTECTOMY      LASER LAPAROSCOPY  2014    NASAL SEPTUM SURGERY      [facility]  DELIVERY ONLY N/A 2017    Procedure:  SECTION () REPEAT;  Surgeon: Shyla Rich DO;  Location: [facility];  Service: Obstetrics    [facility] LAPAROSCOPY SUPRACERVICAL HYSTERECTOMY 250 GM/< N/A 3/1/2021    Procedure: L.S.H., BILATERAL SALPINGECTOMY, RIGHT OOPHORECTOMY;  Surgeon: Eliud Burch DO;  Location: [facility];  Service: Gynecology    [facility] LAPAROSCOPY SURG CHOLECYSTECTOMY N/A 2023    Procedure: CHOLECYSTECTOMY LAPAROSCOPIC;  Surgeon: Braydon Heaton DO;  Location: [facility];  Service: General    [facility] LIG/TRNSXJ FALOPIAN TUBE  DEL/ABDML SURG Bilateral 2017    Procedure: LIGATION/COAGULATION TUBAL;  Surgeon: Shyla Rich DO;  [facility]: [facility];  Service: Obstetrics    TONSILLECTOMY      TUBAL LIGATION         Objective  Vitals:    24 1823   BP: (!) 136/102   BP Location: Right arm   Pulse: 79   Resp: 18   Temp: 98 °F (36.7 °C)   TempSrc: Oral   SpO2: 98%       General: VSS, NAD, awake, alert.    Head: Normocephalic, atraumatic.  Eyes: PERRL, EOM-I.   ENT: Atraumatic external nose and ears.  No stridor.   Neck: Symmetric, supple, trachea midline.  CV: RRR.   Lungs: Respirations unlabored, no tachypnea.    : Inferior aspect of right labia with area of swelling. " Area tender to palpation with fluctuance, consistent with a Bartholin gland cyst. No overlying erythema.   MSK: Extremities without tenderness or gross deformity. No lower extremity edema.   Skin: Dry, intact. No lesions.  Neuro: AAOx3, GCS 15, CN II-XII grossly intact. Motor grossly intact. Sensory grossly intact.  Psychiatric/Behavioral: Appropriate mood and affect. Behavior normal.    Results Reviewed       None          No orders to display     Medications   lidocaine (PF) (XYLOCAINE-MPF) 1 % injection 5 mL (5 mL Infiltration Given by Other 1/29/24 7009)          MDM  43-year-old female presents for evaluation with right labial swelling and pain.  On exam, patient noted to have swelling in the area of the right Bartholin gland with underlying fluctuance, consistent with a Bartholin gland cyst.  No overlying erythema or other signs of infection.  The cyst was drained per the resident procedure note, and patient had improvement in symptoms after drainage.  The wound was left open to allow for continued drainage.  Patient encouraged to follow-up with her GYN for possible marsupialization as this is a recurrent issue for her.  Patient discharged home in stable condition with symptomatic care instructions and strict ED return precautions.    Final Diagnosis:  1. Bartholin gland cyst            Critical Care Time  Procedures

## 2024-01-30 NOTE — ED PROVIDER NOTES
History  Chief Complaint   Patient presents with    Abscess     Reports having a bartholin cyst that was drained 3 weeks ago by Dr. ramsey, reports cyst is back on her right labia and is very painful     HPI    42 yo female with hx of recurrent bartholin cysts presents for evaluation of right bartholin gland cyst. The cyst was drained 3 weeks ago by her gynecologist, Dr Ramsey.  Last week, she noticed the cyst began to fill up again, and is associated with discomfort, and mild burning in the area. She denies any drainage from the cyst, and denies fever, chills, pelvic pain, vaginal bleeding or discharge.     Prior to Admission Medications   Prescriptions Last Dose Informant Patient Reported? Taking?   escitalopram (LEXAPRO) 10 mg tablet   No No   Sig: TAKE 1 TABLET BY MOUTH EVERY DAY   fexofenadine (ALLEGRA) 180 MG tablet  Self Yes No   Sig: Take 180 mg by mouth if needed   fluticasone (FLONASE) 50 mcg/act nasal spray   No No   Si sprays into each nostril daily   Patient taking differently: 2 sprays into each nostril if needed   ketoconazole (NIZORAL) 2 % cream   No No   Sig: Apply topically daily   Patient not taking: Reported on 2024   methocarbamol (Robaxin-750) 750 mg tablet   No No   Sig: Take 1 tablet (750 mg total) by mouth daily at bedtime as needed for muscle spasms (Neck pain, tension headaches)   Patient not taking: Reported on 2024   oxymetazoline (AFRIN) 0.05 % nasal spray   No No   Si sprays by Each Nare route 2 (two) times a day   Patient not taking: Reported on 2024   pseudoephedrine (SUDAFED) 120 MG 12 hr tablet  Self Yes No   Sig: Take 120 mg by mouth 2 (two) times a day PRN     Patient not taking: Reported on 2024      Facility-Administered Medications: None       Past Medical History:   Diagnosis Date    Anxiety     BRCA1 negative     BRCA2 negative     Chronic kidney disease     [id number]    Depression     Disease of thyroid gland     nodule    Endometriosis     Female  "infertility     iui with prior preg    Gastroduodenitis 2019    GERD (gastroesophageal reflux disease)     Gestational diabetes     Kidney stone     PONV (postoperative nausea and vomiting)     gets \"very sick\"    Renal calculi     last assessed 10/20/14; US 10/2014 3mm and 4mm right, non obstructing    Varicella     childhood       Past Surgical History:   Procedure Laterality Date    APPENDECTOMY       SECTION      CYSTOSCOPY N/A 3/1/2021    Procedure: CYSTOSCOPY;  Surgeon: Eliud Burch DO;  [facility];  Service: Gynecology    DIAGNOSTIC LAPAROSCOPY      multiple, last 10/2012, 10/2014    LAPAROSCOPIC OVARIAN CYSTECTOMY      LASER LAPAROSCOPY      NASAL SEPTUM SURGERY      DE  DELIVERY ONLY N/A 2017    Procedure:  SECTION () REPEAT;  Surgeon: Shyla Rich DO;  [facility];  Service: Obstetrics    DE LAPAROSCOPY SUPRACERVICAL HYSTERECTOMY 250 GM/< N/A 3/1/2021    Procedure: L.S.H., BILATERAL SALPINGECTOMY, RIGHT OOPHORECTOMY;  Surgeon: Eliud Burch DO;  [facility];  Service: Gynecology    DE LAPAROSCOPY SURG CHOLECYSTECTOMY N/A 2023    Procedure: CHOLECYSTECTOMY LAPAROSCOPIC;  Surgeon: Braydon Heaton DO;  [facility];  Service: General    DE LIG/TRNSXJ FALOPIAN TUBE  DEL/ABDML SURG Bilateral 2017    Procedure: LIGATION/COAGULATION TUBAL;  Surgeon: Shyla Rich DO;  [facility];  Service: Obstetrics    TONSILLECTOMY      TUBAL LIGATION         Family History   Problem Relation Age of Onset    Depression Mother     Hypertension Mother     Miscarriages / Stillbirths Mother     Anxiety disorder Mother         NOS    Heart defect Mother         aortic valve disorder    Endometriosis Mother     Hyperlipidemia Father         high cholesterol    Breast cancer Sister 28    Cancer Sister         breast    Colon cancer Maternal Grandmother 68    Cancer Maternal Grandmother         colon    " Depression Maternal Grandmother     Dementia Maternal Grandmother     Vision loss Maternal Grandmother     Arthritis Paternal Grandmother     Hearing loss Paternal Grandmother     Heart disease Paternal Grandmother     Stroke Paternal Grandmother     Miscarriages / Stillbirths Paternal Grandmother     Colon cancer Paternal Grandmother 72    Alcohol abuse Maternal Uncle     Breast cancer Maternal Aunt 58    No Known Problems Maternal Aunt     No Known Problems Maternal Aunt     No Known Problems Paternal Aunt      I have reviewed and agree with the history as documented.    E-Cigarette/Vaping    E-Cigarette Use Never User      E-Cigarette/Vaping Substances    Nicotine No     THC No     CBD No     Flavoring No     Other No     Unknown No      Social History     Tobacco Use    Smoking status: Former     Types: Cigarettes    Smokeless tobacco: Never    Tobacco comments:     Current smoker-quit 2 weeks agao, when found pregnant, smoked half pay daily, as per [employer]   Vaping Use    Vaping status: Never Used   Substance Use Topics    Alcohol use: No    Drug use: No        Review of Systems   Constitutional:  Negative for chills and fever.   HENT:  Negative for ear pain and sore throat.    Eyes:  Negative for pain and visual disturbance.   Respiratory:  Negative for cough and shortness of breath.    Cardiovascular:  Negative for chest pain and palpitations.   Gastrointestinal:  Negative for abdominal pain and vomiting.   Genitourinary:  Negative for dysuria, hematuria, pelvic pain, vaginal bleeding and vaginal pain.   Musculoskeletal:  Negative for arthralgias and back pain.   Skin:  Negative for color change and rash.   Neurological:  Negative for seizures and syncope.   All other systems reviewed and are negative.      Physical Exam  ED Triage Vitals [01/29/24 1823]   Temperature Pulse Respirations Blood Pressure SpO2   98 °F (36.7 °C) 79 18 (!) 136/102 98 %      Temp Source Heart Rate Source Patient Position -  Orthostatic VS BP Location FiO2 (%)   Oral Monitor -- Right arm --      Pain Score       --             Orthostatic Vital Signs  Vitals:    01/29/24 1823   BP: (!) 136/102   Pulse: 79       Physical Exam  Vitals and nursing note reviewed.   Constitutional:       General: She is not in acute distress.     Appearance: She is well-developed.   HENT:      Head: Normocephalic and atraumatic.   Eyes:      Conjunctiva/sclera: Conjunctivae normal.   Cardiovascular:      Rate and Rhythm: Normal rate and regular rhythm.      Heart sounds: No murmur heard.  Pulmonary:      Effort: Pulmonary effort is normal. No respiratory distress.      Breath sounds: Normal breath sounds.   Abdominal:      Palpations: Abdomen is soft.      Tenderness: There is no abdominal tenderness.   Genitourinary:     Labia:         Right: Lesion present.       Comments: Fluctuant, tender mass in inferior aspect of R labia, consistent with bartholin gland cyst.   Musculoskeletal:         General: No swelling.      Cervical back: Neck supple.   Skin:     General: Skin is warm and dry.      Capillary Refill: Capillary refill takes less than 2 seconds.   Neurological:      Mental Status: She is alert.   Psychiatric:         Mood and Affect: Mood normal.         ED Medications  Medications   lidocaine (PF) (XYLOCAINE-MPF) 1 % injection 5 mL (5 mL Infiltration Given by Other 1/29/24 9183)       Diagnostic Studies  Results Reviewed       None                   No orders to display         Procedures  Incision and drain    Date/Time: 1/29/2024 11:32 PM    Performed by: Ayse Carlson MD  Authorized by: Ayse Carlson MD    Patient location:  ED  Location:     Type:  Bartholin cyst  Anesthesia (see MAR for exact dosages):     Anesthesia method:  Local infiltration    Local anesthetic:  Lidocaine 1% w/o epi  Procedure details:     Complexity:  Simple    Needle aspiration: no      Incision types:  Single straight    Scalpel blade:  11    Incision  depth:  Submucosal    Drainage:  Bloody and purulent    Drainage amount:  Copious    Wound treatment:  Wound left open    Packing materials:  None  Post-procedure details:     Patient tolerance of procedure:  Tolerated well, no immediate complications  Comments:      1cc of lidocaine without epi          ED Course                                       Medical Decision Making  43-year-old female with history of recurrent Bartholin gland cyst presents for evaluation of right Bartholin gland cyst.    Differentials include but not limited to abscess, cyst.  Mass consistent with Bartholin gland cyst, and was drained in ED with local anesthetic.  Patient tolerated procedure well, and was discharged with instructions to follow-up with OB/GYN for further management and discussion on marsupialization as discussed.    Risk  Prescription drug management.          Disposition  Final diagnoses:   Bartholin gland cyst     Time reflects when diagnosis was documented in both MDM as applicable and the Disposition within this note       Time User Action Codes Description Comment    1/29/2024 10:56 PM Ayse Carlson Add [N75.0] Bartholin gland cyst           ED Disposition       ED Disposition   Discharge    Condition   Stable    Date/Time   Mon Jan 29, 2024 10:56 PM    Comment   Malou LONDON Sheets discharge to home/self care.                   Follow-up Information       Follow up With Specialties Details Why Contact Info    Vitaliy Ramsey MD Obstetrics and Gynecology, Obstetrics, Gynecology Call in 1 day To schedule an appointment [address] [place] [address]  [phone]              Discharge Medication List as of 1/29/2024 11:31 PM        CONTINUE these medications which have NOT CHANGED    Details   escitalopram (LEXAPRO) 10 mg tablet TAKE 1 TABLET BY MOUTH EVERY DAY, Normal      fexofenadine (ALLEGRA) 180 MG tablet Take 180 mg by mouth if needed, Historical Med      fluticasone (FLONASE) 50 mcg/act  nasal spray 2 sprays into each nostril daily, Starting Mon 10/29/2018, Normal      ketoconazole (NIZORAL) 2 % cream Apply topically daily, Starting Mon 11/13/2023, Normal      methocarbamol (Robaxin-750) 750 mg tablet Take 1 tablet (750 mg total) by mouth daily at bedtime as needed for muscle spasms (Neck pain, tension headaches), Starting Mon 11/13/2023, Normal      oxymetazoline (AFRIN) 0.05 % nasal spray 2 sprays by Each Nare route 2 (two) times a day, Starting Tue 10/10/2023, Normal      pseudoephedrine (SUDAFED) 120 MG 12 hr tablet Take 120 mg by mouth 2 (two) times a day PRN  , Historical Med           No discharge procedures on file.    PDMP Review       None             ED Provider  Attending physically available and evaluated Malou Momin I managed the patient along with the ED Attending.    Electronically Signed by           Ayse Carlson MD  02/03/24 [id number]

## 2024-02-05 ENCOUNTER — OFFICE VISIT (OUTPATIENT)
Dept: OBGYN CLINIC | Facility: CLINIC | Age: 44
End: 2024-02-05
Payer: COMMERCIAL

## 2024-02-05 VITALS
BODY MASS INDEX: 27.48 KG/M2 | SYSTOLIC BLOOD PRESSURE: 116 MMHG | HEIGHT: 66 IN | DIASTOLIC BLOOD PRESSURE: 72 MMHG | WEIGHT: 171 LBS

## 2024-02-05 DIAGNOSIS — N75.0 BARTHOLIN GLAND CYST: Primary | ICD-10-CM

## 2024-02-05 PROCEDURE — 99213 OFFICE O/P EST LOW 20 MIN: CPT | Performed by: OBSTETRICS & GYNECOLOGY

## 2024-02-05 NOTE — PATIENT INSTRUCTIONS
The patient has a history of right Bartholin cyst abscess.  Has been drained twice in the last 3 weeks once in my office and once in the emergency department.  If it happens again I would strongly suggest marsupialization with an inpatient procedure.  She will keep us informed of her progress.

## 2024-02-05 NOTE — PROGRESS NOTES
This is a 43-year-old female well-known to me who is seen today for a history of a right Bartholin gland cyst abscess.  She was seen in the emergency department last week and had incision and drainage.  She returns today for follow-up.  The area looks great is noninflamed.  There is no more accumulation.  This is a swelling but no tenderness.  The patient was informed that it could recur.  At that time she would need marsupialization as an outpatient basis.  Right now she does not need pain there is no fluctuation.  There is no mass to see.  She was reassured.  If it occurs again she will call our office to make arranges for probably a marsupialization.

## 2024-02-21 DIAGNOSIS — Z00.6 ENCOUNTER FOR EXAMINATION FOR NORMAL COMPARISON OR CONTROL IN CLINICAL RESEARCH PROGRAM: ICD-10-CM

## 2024-03-07 ENCOUNTER — TELEPHONE (OUTPATIENT)
Dept: PSYCHIATRY | Facility: CLINIC | Age: 44
End: 2024-03-07

## 2024-03-18 ENCOUNTER — OFFICE VISIT (OUTPATIENT)
Dept: OBGYN CLINIC | Facility: CLINIC | Age: 44
End: 2024-03-18
Payer: COMMERCIAL

## 2024-03-18 VITALS
HEIGHT: 66 IN | SYSTOLIC BLOOD PRESSURE: 122 MMHG | BODY MASS INDEX: 27 KG/M2 | WEIGHT: 168 LBS | DIASTOLIC BLOOD PRESSURE: 82 MMHG

## 2024-03-18 DIAGNOSIS — N75.0 BARTHOLIN'S CYST: Primary | ICD-10-CM

## 2024-03-18 PROCEDURE — 99213 OFFICE O/P EST LOW 20 MIN: CPT | Performed by: OBSTETRICS & GYNECOLOGY

## 2024-03-18 NOTE — PROGRESS NOTES
Subjective     Malou Fink is a 43 y.o. G8, P3 female here for a problem visit.  Patient has recurrent right Bartholin's gland cyst she had it also in the past but has had increased episodes and had I&D in office twice once at the beginning of January and again at the end of January.  Patient is here to review marsupialization as the gland is again reaccumulating she had switch back to being in office more frequently and is not able to change position or stand as often as she previously had at the office.  Reviewed marsupialization procedure with exam under anesthesia procedure as well as risks and benefits and expected outcomes.  Patient is here with her mother they report no further questions and desire to proceed.   Patient has prior supracervical hysterectomy with RSO and gets occasional menses, getting hot flashes and night sweats.     Gynecologic History  Patient's last menstrual period was 2019 (exact date).  Contraception: status post hysterectomy  Last mammogram: 2022. Results were: normal, dense    Obstetric History  OB History    Para Term  AB Living   8 4 3 1 4 3   SAB IAB Ectopic Multiple Live Births   4     1 3      # Outcome Date GA Lbr Favio/2nd Weight Sex Delivery Anes PTL Lv   8 Term 17 38w5d  4139 g (9 lb 2 oz) M CS-LTranv Spinal  MAUREEN   7A  08/24/15 32w6d  2126 g (4 lb 11 oz) F CS-Unspec Spinal  MAUREEN   7B  08/24/15 32w6d  1928 g (4 lb 4 oz) F CS-Unspec Spinal  MAUREEN   6 Term            5 Term            4 SAB            3 SAB            2 SAB            1 SAB              Past Medical History:   Diagnosis Date    Anxiety     BRCA1 negative     BRCA2 negative     Chronic kidney disease     [id number]    Depression     Disease of thyroid gland     nodule    Endometriosis     Female infertility     iui with prior preg    Gastroduodenitis 2019    GERD (gastroesophageal reflux disease)     Gestational diabetes 2015    Kidney stone     PONV (postoperative  "nausea and vomiting)     gets \"very sick\"    Renal calculi     last assessed 10/20/14; US 10/2014 3mm and 4mm right, non obstructing    Varicella     childhood     Past Surgical History:   Procedure Laterality Date    APPENDECTOMY       SECTION      CYSTOSCOPY N/A 3/1/2021    Procedure: CYSTOSCOPY;  Surgeon: Eliud Burch DO;  [facility];  Service: Gynecology    DIAGNOSTIC LAPAROSCOPY      multiple, last 10/2012, 10/2014    LAPAROSCOPIC OVARIAN CYSTECTOMY      LASER LAPAROSCOPY      NASAL SEPTUM SURGERY      DC  DELIVERY ONLY N/A 2017    Procedure:  SECTION () REPEAT;  Surgeon: Shyla Rich DO;  [facility];  Service: Obstetrics    DC LAPAROSCOPY SUPRACERVICAL HYSTERECTOMY 250 GM/< N/A 3/1/2021    Procedure: L.S.H., BILATERAL SALPINGECTOMY, RIGHT OOPHORECTOMY;  Surgeon: Eliud Burch DO;  [facility];  Service: Gynecology    DC LAPAROSCOPY SURG CHOLECYSTECTOMY N/A 2023    Procedure: CHOLECYSTECTOMY LAPAROSCOPIC;  Surgeon: Braydon Heaton DO;  [facility];  Service: General    DC LIG/TRNSXJ FALOPIAN TUBE  DEL/ABDML SURG Bilateral 2017    Procedure: LIGATION/COAGULATION TUBAL;  Surgeon: Shyla Rich DO;  [facility];  Service: Obstetrics    TONSILLECTOMY      TUBAL LIGATION       Current Outpatient Medications   Medication Instructions    escitalopram (LEXAPRO) 10 mg tablet TAKE 1 TABLET BY MOUTH EVERY DAY    fexofenadine (ALLEGRA) 180 mg, Oral, As needed    fluticasone (FLONASE) 50 mcg/act nasal spray 2 sprays, Nasal, Daily    ketoconazole (NIZORAL) 2 % cream Topical, Daily    methocarbamol (ROBAXIN-750) 750 mg, Oral, Daily at bedtime PRN    oxymetazoline (AFRIN) 0.05 % nasal spray 2 sprays, Each Nare, 2 times daily    pseudoephedrine (SUDAFED) 120 mg, 2 times daily     Allergies   Allergen Reactions    Cefprozil Shortness Of Breath    Pertussis Vaccine     Shellfish-Derived Products - Food " "Allergy Hives    Amoxicillin      Thrush,yeast infection     Social History     Tobacco Use    Smoking status: Former     Types: Cigarettes    Smokeless tobacco: Never    Tobacco comments:     Current smoker-quit 2 weeks agao, when found pregnant, smoked half pay daily, as per [employer]   Vaping Use    Vaping status: Never Used   Substance Use Topics    Alcohol use: No    Drug use: No         Review of Systems  Review of Systems   Constitutional: Negative.  Negative for chills, fatigue, fever and unexpected weight change.   HENT: Negative.  Negative for dental problem, sinus pressure and sinus pain.    Eyes: Negative.  Negative for visual disturbance.   Respiratory: Negative.  Negative for cough, shortness of breath and wheezing.    Cardiovascular: Negative.  Negative for chest pain and leg swelling.   Gastrointestinal: Negative.  Negative for constipation, diarrhea, nausea and vomiting.   Genitourinary:  Negative for menstrual problem, pelvic pain and urgency.        Occasional bleeding  Vaginal cyst   Musculoskeletal: Negative.  Negative for back pain.   Allergic/Immunologic: Positive for environmental allergies.   Neurological:  Positive for headaches. Negative for dizziness.        Objective     /82 (BP Location: Left arm, Patient Position: Sitting, Cuff Size: Standard)   Ht 5' 6\" (1.676 m)   Wt 76.2 kg (168 lb)   LMP 02/01/2019 (Exact Date)   BMI 27.12 kg/m²   General appearance: alert and oriented, in no acute distress  Head: Normocephalic, without obvious abnormality, atraumatic  Lungs: clear to auscultation bilaterally  Heart: regular rate and rhythm, S1, S2 normal, no murmur, click, rub or gallop  Abdomen: soft, non-tender; bowel sounds normal; no masses,  no organomegaly  Pelvic: vagina normal without discharge, uterus normal size, shape, and consistency, no cervical motion tenderness, no adnexal masses or tenderness, and swollen right Bartholin's gland curving into the vaginal opening about 4 " cm x 3.5 cm no erythema mildly tender on palpation  Extremities: extremities normal, warm and well-perfused; no cyanosis, clubbing, or edema      Assessment  43-year-old G8, P3 with persistent right Bartholin's has already been drained by I&D in office twice in January currently recurring again    Plan  Exam under anesthesia, marsupialization

## 2024-03-21 ENCOUNTER — TELEPHONE (OUTPATIENT)
Dept: GYNECOLOGY | Facility: CLINIC | Age: 44
End: 2024-03-21

## 2024-03-21 NOTE — TELEPHONE ENCOUNTER
Pt is scheduled for 4/16/24 @ [facility] with Dr. Lynne.    Pt was also advised to stop by the office to  the soap and booklet.

## 2024-03-21 NOTE — TELEPHONE ENCOUNTER
----- Message from Edwina Lynne MD sent at 3/18/2024 10:58 AM EDT -----  Regarding: bartholins  [facility] OB GYN Department      slightly more urgent to take advantage of swollen cyst  before infected or ruptured hopefully can be done within a month or sooner    Surgery Scheduling Sheet    Patient Name: Malou Fink  : 1980    Provider: Edwina Lynne MD     Needed: no; Language: N/A    Procedure: exam under anesthesia and marsupialization right Bartholin's gland cyst    Diagnosis: Right Bartholin's gland cyst    Special Needs or Equipment: none (see note above)    Anesthesia: IV sedation with anesthesia    Length of stay: outpatient  Does patient have comorbid conditions that will require close perioperative monitoring prior to safe discharge: no    The patient has comorbid conditions that will require close perioperative monitoring prior to safe discharge, including N/A.   This may require acute care beyond the usual and routine recovery period. As such, inpatient admission post-operatively is expected and appropriate, and anticipated hospital length of stay will be >2 midnights.    Pre-Admission Testing Needed: yes   Labs needed: cbc, cmp, and urine pregnancy test      Medical Clearance Needed: no; Provider: N/A    MA Form Signed (tubals/hysterectomy): Not Indicated    Surgical Drink Given: no     How many days out of work: 3 day(s)     How many days no drivin day(s)       Are other appointments needed?  yes  Interval for post op appt: 2 week(s)     For Surgical Scheduler:    [place]: [facility]  Surgery Scheduled On:    Pre-op Appt: done    Post op Appt:    Consult/Medical clearance appt:

## 2024-04-01 ENCOUNTER — ANESTHESIA (OUTPATIENT)
Dept: ANESTHESIOLOGY | Facility: HOSPITAL | Age: 44
End: 2024-04-01

## 2024-04-01 ENCOUNTER — ANESTHESIA EVENT (OUTPATIENT)
Dept: ANESTHESIOLOGY | Facility: HOSPITAL | Age: 44
End: 2024-04-01

## 2024-04-05 ENCOUNTER — ANESTHESIA EVENT (OUTPATIENT)
Dept: PERIOP | Facility: AMBULARY SURGERY CENTER | Age: 44
End: 2024-04-05
Payer: COMMERCIAL

## 2024-04-05 NOTE — PRE-PROCEDURE INSTRUCTIONS
Pre-Surgery Instructions:   Medication Instructions    escitalopram (LEXAPRO) 10 mg tablet Take night before surgery    fexofenadine (ALLEGRA) 180 MG tablet Uses PRN- OK to take day of surgery    fluticasone (FLONASE) 50 mcg/act nasal spray Uses PRN- OK to take day of surgery    methocarbamol (Robaxin-750) 750 mg tablet Uses PRN- OK to take day of surgery    pseudoephedrine (SUDAFED) 120 MG 12 hr tablet Uses PRN- DO NOT take day of surgery     Spoke with pt via phone.    Medication instructions for day surgery reviewed. Please use only a sip of water to take your instructed medications. Avoid all over the counter vitamins, supplements and NSAIDS for one week prior to surgery per anesthesia guidelines. Tylenol is ok to take as needed.     You will receive a call one business day prior to surgery with an arrival time and hospital directions. If your surgery is scheduled on a Monday, the hospital will be calling you on the Friday prior to your surgery. If you have not heard from anyone by 8pm, please call the hospital supervisor through the hospital  at [phone]. ([place] [phone] or [place] [phone]).    Do not eat or drink anything after midnight the night before your surgery, including candy, mints, lifesavers, or chewing gum. Do not drink alcohol 24hrs before your surgery. Try not to smoke at least 24hrs before your surgery.       Follow the pre surgery showering instructions as listed in the “My Surgical Experience Booklet” or otherwise provided by your surgeon's office. Do not use a blade to shave the surgical area 1 week before surgery. It is okay to use a clean electric clippers up to 24 hours before surgery. Do not apply any lotions, creams, including makeup, cologne, deodorant, or perfumes after showering on the day of your surgery. Do not use dry shampoo, hair spray, hair gel, or any type of hair products.     No contact lenses, eye make-up, or artificial eyelashes. Remove nail polish,  including gel polish, and any artificial, gel, or acrylic nails if possible. Remove all jewelry including rings and body piercing jewelry.     Wear causal clothing that is easy to take on and off. Consider your type of surgery.    Keep any valuables, jewelry, piercings at home. Please bring any specially ordered equipment (sling, braces) if indicated.    Arrange for a responsible person to drive you to and from the hospital on the day of your surgery. Please confirm the visitor policy for the day of your procedure when you receive your phone call with an arrival time.     Call the surgeon's office with any new illnesses, exposures, or additional questions prior to surgery.    Please reference your “My Surgical Experience Booklet” for additional information to prepare for your upcoming surgery.

## 2024-04-06 ENCOUNTER — APPOINTMENT (OUTPATIENT)
Dept: LAB | Facility: CLINIC | Age: 44
End: 2024-04-06
Payer: COMMERCIAL

## 2024-04-06 DIAGNOSIS — N75.0 BARTHOLIN GLAND CYST: ICD-10-CM

## 2024-04-06 DIAGNOSIS — Z00.6 ENCOUNTER FOR EXAMINATION FOR NORMAL COMPARISON OR CONTROL IN CLINICAL RESEARCH PROGRAM: ICD-10-CM

## 2024-04-06 LAB
ALBUMIN SERPL BCP-MCNC: 4.1 G/DL (ref 3.5–5)
ALP SERPL-CCNC: 76 U/L (ref 34–104)
ALT SERPL W P-5'-P-CCNC: 17 U/L (ref 7–52)
ANION GAP SERPL CALCULATED.3IONS-SCNC: 8 MMOL/L (ref 4–13)
AST SERPL W P-5'-P-CCNC: 14 U/L (ref 13–39)
BASOPHILS # BLD AUTO: 0.05 THOUSANDS/ÂΜL (ref 0–0.1)
BASOPHILS NFR BLD AUTO: 1 % (ref 0–1)
BILIRUB SERPL-MCNC: 0.65 MG/DL (ref 0.2–1)
BUN SERPL-MCNC: 9 MG/DL (ref 5–25)
CALCIUM SERPL-MCNC: 8.8 MG/DL (ref 8.4–10.2)
CHLORIDE SERPL-SCNC: 104 MMOL/L (ref 96–108)
CO2 SERPL-SCNC: 30 MMOL/L (ref 21–32)
CREAT SERPL-MCNC: 0.6 MG/DL (ref 0.6–1.3)
EOSINOPHIL # BLD AUTO: 0.07 THOUSAND/ÂΜL (ref 0–0.61)
EOSINOPHIL NFR BLD AUTO: 1 % (ref 0–6)
ERYTHROCYTE [DISTWIDTH] IN BLOOD BY AUTOMATED COUNT: 13 % (ref 11.6–15.1)
GFR SERPL CREATININE-BSD FRML MDRD: 113 ML/MIN/1.73SQ M
GLUCOSE P FAST SERPL-MCNC: 100 MG/DL (ref 65–99)
HCT VFR BLD AUTO: 41.9 % (ref 34.8–46.1)
HGB BLD-MCNC: 13.8 G/DL (ref 11.5–15.4)
IMM GRANULOCYTES # BLD AUTO: 0.01 THOUSAND/UL (ref 0–0.2)
IMM GRANULOCYTES NFR BLD AUTO: 0 % (ref 0–2)
LYMPHOCYTES # BLD AUTO: 2.61 THOUSANDS/ÂΜL (ref 0.6–4.47)
LYMPHOCYTES NFR BLD AUTO: 37 % (ref 14–44)
MCH RBC QN AUTO: 28.3 PG (ref 26.8–34.3)
MCHC RBC AUTO-ENTMCNC: 32.9 G/DL (ref 31.4–37.4)
MCV RBC AUTO: 86 FL (ref 82–98)
MONOCYTES # BLD AUTO: 0.38 THOUSAND/ÂΜL (ref 0.17–1.22)
MONOCYTES NFR BLD AUTO: 5 % (ref 4–12)
NEUTROPHILS # BLD AUTO: 3.94 THOUSANDS/ÂΜL (ref 1.85–7.62)
NEUTS SEG NFR BLD AUTO: 56 % (ref 43–75)
NRBC BLD AUTO-RTO: 0 /100 WBCS
PLATELET # BLD AUTO: 274 THOUSANDS/UL (ref 149–390)
PMV BLD AUTO: 10.6 FL (ref 8.9–12.7)
POTASSIUM SERPL-SCNC: 3.7 MMOL/L (ref 3.5–5.3)
PROT SERPL-MCNC: 6.4 G/DL (ref 6.4–8.4)
RBC # BLD AUTO: 4.87 MILLION/UL (ref 3.81–5.12)
SODIUM SERPL-SCNC: 142 MMOL/L (ref 135–147)
WBC # BLD AUTO: 7.06 THOUSAND/UL (ref 4.31–10.16)

## 2024-04-06 PROCEDURE — 80053 COMPREHEN METABOLIC PANEL: CPT

## 2024-04-06 PROCEDURE — 36415 COLL VENOUS BLD VENIPUNCTURE: CPT

## 2024-04-06 PROCEDURE — 85025 COMPLETE CBC W/AUTO DIFF WBC: CPT

## 2024-04-12 PROCEDURE — NC001 PR NO CHARGE: Performed by: OBSTETRICS & GYNECOLOGY

## 2024-04-12 NOTE — H&P
"Malou Fink is a 43-year-old G8, P3 with recurrent right Bartholin's gland cyst.  Patient has had this I&D in the office twice most recently at the beginning of January and at the end of January.  Patient is having discomfort with this area and would like more definitive therapy discussed and reviewed with patient options to try to decrease future recurrence as well as marsupialization of gland at this time.  We reviewed marsupialization procedure as well as risks and benefits and expected outcomes and surgical instructions.  Patient reports no further questions and desires to proceed.  Her mother is also with her and has no further questions.    GYN history  Contraception: History of hysterectomy  Last mammogram 2022 normal dense    Past Medical History:   Diagnosis Date    Anxiety     BRCA1 negative     BRCA2 negative     Chronic kidney disease     [id number]    Depression     Disease of thyroid gland     nodule    Endometriosis     Female infertility     iui with prior preg    Gastroduodenitis 2019    GERD (gastroesophageal reflux disease)     Gestational diabetes     Kidney stone     PONV (postoperative nausea and vomiting)     gets \"very sick\"    Renal calculi     last assessed 10/20/14; US 10/2014 3mm and 4mm right, non obstructing    Varicella     childhood     Past Surgical History:   Procedure Laterality Date    APPENDECTOMY       SECTION      CYSTOSCOPY N/A 3/1/2021    Procedure: CYSTOSCOPY;  Surgeon: Eliud Burch DO;  Location: [facility];  Service: Gynecology    DIAGNOSTIC LAPAROSCOPY      multiple, last 10/2012, 10/2014    LAPAROSCOPIC OVARIAN CYSTECTOMY      LASER LAPAROSCOPY      NASAL SEPTUM SURGERY      CA  DELIVERY ONLY N/A 2017    Procedure:  SECTION () REPEAT;  Surgeon: Shyla Rich DO;  Location: [facility];  Service: Obstetrics    CA LAPAROSCOPY SUPRACERVICAL HYSTERECTOMY 250 GM/< N/A 3/1/2021    Procedure: L.S.H., BILATERAL " SALPINGECTOMY, RIGHT OOPHORECTOMY;  Surgeon: Eliud Burch DO;  [facility]: [facility];  Service: Gynecology    MN LAPAROSCOPY SURG CHOLECYSTECTOMY N/A 2023    Procedure: CHOLECYSTECTOMY LAPAROSCOPIC;  Surgeon: Braydon Heaton DO;  [facility]: [facility];  Service: General    MN LIG/TRNSXJ FALOPIAN TUBE  DEL/ABDML SURG Bilateral 2017    Procedure: LIGATION/COAGULATION TUBAL;  Surgeon: Shyla Rich DO;  [facility]: [facility];  Service: Obstetrics    TONSILLECTOMY      TUBAL LIGATION       OB History          8    Para   4    Term   3       1    AB   4    Living   3         SAB   4    IAB        Ectopic        Multiple   1    Live Births   3               Current Outpatient Medications   Medication Instructions    escitalopram (LEXAPRO) 10 mg tablet TAKE 1 TABLET BY MOUTH EVERY DAY    fexofenadine (ALLEGRA) 180 mg, Oral, As needed    fluticasone (FLONASE) 50 mcg/act nasal spray 2 sprays, Nasal, Daily    ketoconazole (NIZORAL) 2 % cream Topical, Daily    methocarbamol (ROBAXIN-750) 750 mg, Oral, Daily at bedtime PRN    oxymetazoline (AFRIN) 0.05 % nasal spray 2 sprays, Each Nare, 2 times daily    pseudoephedrine (SUDAFED) 120 mg, Oral, 2 times daily, PRN     Allergies   Allergen Reactions    Cefprozil Shortness Of Breath    Pertussis Vaccine     Shellfish-Derived Products - Food Allergy Hives    Amoxicillin      Thrush,yeast infection     Social History     Tobacco Use    Smoking status: Former     Types: Cigarettes    Smokeless tobacco: Never   Vaping Use    Vaping status: Never Used   Substance Use Topics    Alcohol use: No    Drug use: No     Physical exam: Height 5 foot 6 inches, weight 168 pounds (76.2 kg), BMI 27.12, /82  Head: Normocephalic/atraumatic  Lungs: Clear to auscultation bilaterally  Heart: Regular rhythm S1-S2  Abdomen: Soft nontender plus bowel sounds organomegaly  Pelvic: Vagina normal without discharge no palpable adnexal masses, swollen right  Bartholin's gland curving into vaginal opening approximately 4 cm x 3.5 cm no erythema mildly tender on palpation  Extremities no edema cyanosis or clubbing, warm well-perfused    Assessment: 43-year-old G8, P3 with persistent recurrent right Bartholin's gland cyst.    Plan: Exam under anesthesia marsupialization of right Bartholin's gland cyst.

## 2024-04-15 NOTE — ANESTHESIA PREPROCEDURE EVALUATION
Procedure:  MARSUPILIZATION BARTHOLIN CYST, EUA (Right: Perineum)  No Recent URI  Relevant Problems   ANESTHESIA   (+) PONV (postoperative nausea and vomiting)      NEURO/PSYCH   (+) Anxiety and depression        Physical Exam    Airway    Mallampati score: II  TM Distance: >3 FB  Neck ROM: full     Dental   No notable dental hx     Cardiovascular      Pulmonary      Other Findings  post-pubertal.      Anesthesia Plan  ASA Score- 2     Anesthesia Type- IV sedation with anesthesia with ASA Monitors.         Additional Monitors:     Airway Plan:            Plan Factors-Exercise tolerance (METS): >4 METS.    Chart reviewed.    Patient summary reviewed.    Patient is not a current smoker.              Induction- intravenous.    Postoperative Plan-     Informed Consent- Anesthetic plan and risks discussed with patient.  I personally reviewed this patient with the CRNA. Discussed and agreed on the Anesthesia Plan with the CRNA..

## 2024-04-16 ENCOUNTER — ANESTHESIA (OUTPATIENT)
Dept: PERIOP | Facility: AMBULARY SURGERY CENTER | Age: 44
End: 2024-04-16
Payer: COMMERCIAL

## 2024-04-16 ENCOUNTER — HOSPITAL ENCOUNTER (OUTPATIENT)
Facility: AMBULARY SURGERY CENTER | Age: 44
Setting detail: OUTPATIENT SURGERY
Discharge: HOME | End: 2024-04-16
Attending: OBSTETRICS & GYNECOLOGY | Admitting: OBSTETRICS & GYNECOLOGY
Payer: COMMERCIAL

## 2024-04-16 VITALS
HEIGHT: 65 IN | BODY MASS INDEX: 27.49 KG/M2 | OXYGEN SATURATION: 100 % | TEMPERATURE: 97.3 F | SYSTOLIC BLOOD PRESSURE: 126 MMHG | RESPIRATION RATE: 18 BRPM | WEIGHT: 165 LBS | HEART RATE: 63 BPM | DIASTOLIC BLOOD PRESSURE: 86 MMHG

## 2024-04-16 DIAGNOSIS — N75.0 BARTHOLIN GLAND CYST: ICD-10-CM

## 2024-04-16 PROCEDURE — 56440 MRSPLZATN BRTHLNS GLND CST: CPT | Performed by: OBSTETRICS & GYNECOLOGY

## 2024-04-16 PROCEDURE — 87205 SMEAR GRAM STAIN: CPT | Performed by: OBSTETRICS & GYNECOLOGY

## 2024-04-16 PROCEDURE — 87070 CULTURE OTHR SPECIMN AEROBIC: CPT | Performed by: OBSTETRICS & GYNECOLOGY

## 2024-04-16 PROCEDURE — 88304 TISSUE EXAM BY PATHOLOGIST: CPT | Performed by: PATHOLOGY

## 2024-04-16 PROCEDURE — 87075 CULTR BACTERIA EXCEPT BLOOD: CPT | Performed by: OBSTETRICS & GYNECOLOGY

## 2024-04-16 RX ORDER — ONDANSETRON 2 MG/ML
4 INJECTION INTRAMUSCULAR; INTRAVENOUS ONCE AS NEEDED
Status: DISCONTINUED | OUTPATIENT
Start: 2024-04-16 | End: 2024-04-16 | Stop reason: HOSPADM

## 2024-04-16 RX ORDER — MIDAZOLAM HYDROCHLORIDE 2 MG/2ML
INJECTION, SOLUTION INTRAMUSCULAR; INTRAVENOUS AS NEEDED
Status: DISCONTINUED | OUTPATIENT
Start: 2024-04-16 | End: 2024-04-16

## 2024-04-16 RX ORDER — FENTANYL CITRATE/PF 50 MCG/ML
25 SYRINGE (ML) INJECTION
Status: DISCONTINUED | OUTPATIENT
Start: 2024-04-16 | End: 2024-04-16 | Stop reason: HOSPADM

## 2024-04-16 RX ORDER — DEXAMETHASONE SODIUM PHOSPHATE 10 MG/ML
INJECTION, SOLUTION INTRAMUSCULAR; INTRAVENOUS AS NEEDED
Status: DISCONTINUED | OUTPATIENT
Start: 2024-04-16 | End: 2024-04-16

## 2024-04-16 RX ORDER — LIDOCAINE HYDROCHLORIDE 10 MG/ML
INJECTION, SOLUTION EPIDURAL; INFILTRATION; INTRACAUDAL; PERINEURAL AS NEEDED
Status: DISCONTINUED | OUTPATIENT
Start: 2024-04-16 | End: 2024-04-16

## 2024-04-16 RX ORDER — MAGNESIUM HYDROXIDE 1200 MG/15ML
LIQUID ORAL AS NEEDED
Status: DISCONTINUED | OUTPATIENT
Start: 2024-04-16 | End: 2024-04-16 | Stop reason: HOSPADM

## 2024-04-16 RX ORDER — HYDROMORPHONE HCL/PF 1 MG/ML
0.5 SYRINGE (ML) INJECTION
Status: DISCONTINUED | OUTPATIENT
Start: 2024-04-16 | End: 2024-04-16 | Stop reason: HOSPADM

## 2024-04-16 RX ORDER — LIDOCAINE HYDROCHLORIDE 10 MG/ML
INJECTION, SOLUTION EPIDURAL; INFILTRATION; INTRACAUDAL; PERINEURAL AS NEEDED
Status: DISCONTINUED | OUTPATIENT
Start: 2024-04-16 | End: 2024-04-16 | Stop reason: HOSPADM

## 2024-04-16 RX ORDER — ONDANSETRON 2 MG/ML
INJECTION INTRAMUSCULAR; INTRAVENOUS AS NEEDED
Status: DISCONTINUED | OUTPATIENT
Start: 2024-04-16 | End: 2024-04-16

## 2024-04-16 RX ORDER — PROPOFOL 10 MG/ML
INJECTION, EMULSION INTRAVENOUS CONTINUOUS PRN
Status: DISCONTINUED | OUTPATIENT
Start: 2024-04-16 | End: 2024-04-16

## 2024-04-16 RX ORDER — FENTANYL CITRATE 50 UG/ML
INJECTION, SOLUTION INTRAMUSCULAR; INTRAVENOUS AS NEEDED
Status: DISCONTINUED | OUTPATIENT
Start: 2024-04-16 | End: 2024-04-16

## 2024-04-16 RX ORDER — KETOROLAC TROMETHAMINE 30 MG/ML
INJECTION, SOLUTION INTRAMUSCULAR; INTRAVENOUS AS NEEDED
Status: DISCONTINUED | OUTPATIENT
Start: 2024-04-16 | End: 2024-04-16

## 2024-04-16 RX ORDER — LIDOCAINE HYDROCHLORIDE 10 MG/ML
0.5 INJECTION, SOLUTION EPIDURAL; INFILTRATION; INTRACAUDAL; PERINEURAL ONCE AS NEEDED
Status: DISCONTINUED | OUTPATIENT
Start: 2024-04-16 | End: 2024-04-16 | Stop reason: HOSPADM

## 2024-04-16 RX ORDER — SODIUM CHLORIDE, SODIUM LACTATE, POTASSIUM CHLORIDE, CALCIUM CHLORIDE 600; 310; 30; 20 MG/100ML; MG/100ML; MG/100ML; MG/100ML
125 INJECTION, SOLUTION INTRAVENOUS CONTINUOUS
Status: DISCONTINUED | OUTPATIENT
Start: 2024-04-16 | End: 2024-04-16 | Stop reason: HOSPADM

## 2024-04-16 RX ADMIN — PROPOFOL 100 MCG/KG/MIN: 10 INJECTION, EMULSION INTRAVENOUS at 13:21

## 2024-04-16 RX ADMIN — FENTANYL CITRATE 25 MCG: 50 INJECTION INTRAMUSCULAR; INTRAVENOUS at 13:31

## 2024-04-16 RX ADMIN — FENTANYL CITRATE 25 MCG: 50 INJECTION INTRAMUSCULAR; INTRAVENOUS at 13:37

## 2024-04-16 RX ADMIN — DEXAMETHASONE SODIUM PHOSPHATE 10 MG: 10 INJECTION INTRAMUSCULAR; INTRAVENOUS at 13:23

## 2024-04-16 RX ADMIN — FENTANYL CITRATE 25 MCG: 50 INJECTION INTRAMUSCULAR; INTRAVENOUS at 13:21

## 2024-04-16 RX ADMIN — FENTANYL CITRATE 25 MCG: 50 INJECTION INTRAMUSCULAR; INTRAVENOUS at 13:26

## 2024-04-16 RX ADMIN — MIDAZOLAM HYDROCHLORIDE 2 MG: 1 INJECTION, SOLUTION INTRAMUSCULAR; INTRAVENOUS at 13:17

## 2024-04-16 RX ADMIN — ONDANSETRON 4 MG: 2 INJECTION INTRAMUSCULAR; INTRAVENOUS at 13:23

## 2024-04-16 RX ADMIN — LIDOCAINE HYDROCHLORIDE 50 MG: 10 INJECTION, SOLUTION EPIDURAL; INFILTRATION; INTRACAUDAL at 13:21

## 2024-04-16 RX ADMIN — KETOROLAC TROMETHAMINE 15 MG: 30 INJECTION, SOLUTION INTRAMUSCULAR; INTRAVENOUS at 13:44

## 2024-04-16 RX ADMIN — SODIUM CHLORIDE, SODIUM LACTATE, POTASSIUM CHLORIDE, AND CALCIUM CHLORIDE: .6; .31; .03; .02 INJECTION, SOLUTION INTRAVENOUS at 12:13

## 2024-04-16 NOTE — ANESTHESIA POSTPROCEDURE EVALUATION
Post-Op Assessment Note    CV Status:  Stable  Pain Score: 0    Pain management: adequate       Mental Status:  Alert and awake   Hydration Status:  Euvolemic   PONV Controlled:  Controlled   Airway Patency:  Patent     Post Op Vitals Reviewed: Yes    No anethesia notable event occurred.    Staff: LESTER               /71 (04/16/24 1400)    Temp 97.5 °F (36.4 °C) (04/16/24 1352)    Pulse 75 (04/16/24 1400)   Resp 18 (04/16/24 1400)    SpO2 98 % (04/16/24 1400)

## 2024-04-16 NOTE — OP NOTE
OPERATIVE REPORT  PATIENT NAME: Malou Fink    :  1980  MRN: [id number]  Pt Location: [facility]OM 01    SURGERY DATE: 2024    Surgeons and Role:     * Edwina Lynne MD - Primary     * Ju Tavares MD - Assisting    Preop Diagnosis:  Bartholin gland cyst [N75.0]    Post-Op Diagnosis Codes:     * Bartholin gland cyst [N75.0]    Procedure(s):  Right - MARSUPILIZATION BARTHOLIN CYST. EUA    Specimen(s):  ID Type Source Tests Collected by Time Destination   1 : bartholin cyst wall Tissue Bartholin's Gland Cyst TISSUE EXAM Edwina Lynne MD 2024 1336    A :  Tissue Bartholin's Gland Cyst ANAEROBIC CULTURE AND GRAM STAIN, CULTURE, TISSUE AND GRAM STAIN Edwina Lynne MD 2024 1335        Estimated Blood Loss:   Minimal    Drains:  * No LDAs found *    Anesthesia Type:   IV Sedation with Anesthesia    Operative Indications:  Bartholin gland cyst [N75.0]    Operative Findings:   1. Non-erythematous, approximately 5 cm x 3 cm protrusion of right inferior vaginal wall at 7 o'clock position consistent with Bartholin cyst noted. External genitalia otherwise grossly normal in appearance with no other lesions, ulcerations, or lacerations noted.    2.  Vagina and cervix were grossly normal in appearance without any lacerations or lesions.     Complications:   None    Procedure and Technique:  The patient was taken to the operating room where timeout was performed to confirm correct patient and correct procedure.  IV sedation was established and found to be adequate.  The patient was then positioned on the operating table in dorsolithotomy position with legs supported by Steri-Strips and SCDs bilaterally.  All pressure points were padded and warm blankets were placed to maintain control of core body temperature.  The patient was then prepped and draped in usual sterile fashion.  The labia were palpated bilaterally.  There was a Bartholin cyst noted to be approximately 5 cm x 3 cm in size without  erythema and induration.    A 2 cm centimeter vertical incision was made at the junction between the skin and the mucosa and the cyst was partially dissected off the mucosa.  A crescent-shaped portion of the cyst wall was then excised and sent to pathology. On opening, the cyst was noted to contain creamy brown, nonpurulent, non-malodorous material. Aerobic and anaerobic cultures were taken and sent to pathology. The cyst wall was then sutured to the surrounding mucosa using 3-0 Chromic in interrupted fashion with 7 stitches. Good hemostasis was confirmed at the completion of the procedure. Cervix was visualized after the procedure using Ileana speculum and appeared grossly normal.    All needle, sponge, instrument counts were noted to be correct ×2.  Dr. Lynne present for all colindres portions of the procedure.    Patient Disposition:  PACU , hemodynamically stable, and extubated and stable    SIGNATURE: Ju Tavares MD  DATE: April 16, 2024  TIME: 1:55 PM

## 2024-04-16 NOTE — INTERVAL H&P NOTE
H&P reviewed. After examining the patient I find no changes in the patients condition since the H&P had been written.    Vitals:    04/16/24 1211   BP: 134/73   Pulse: 84   Resp: 18   Temp: (!) 97.1 °F (36.2 °C)   SpO2: 97%

## 2024-04-19 LAB
BACTERIA SPEC ANAEROBE CULT: NO GROWTH
BACTERIA TISS AEROBE CULT: NO GROWTH
GRAM STN SPEC: NORMAL
GRAM STN SPEC: NORMAL

## 2024-04-22 PROCEDURE — 88304 TISSUE EXAM BY PATHOLOGIST: CPT | Performed by: PATHOLOGY

## 2024-04-28 LAB
APOB+LDLR+PCSK9 GENE MUT ANL BLD/T: NOT DETECTED
BRCA1+BRCA2 DEL+DUP + FULL MUT ANL BLD/T: NOT DETECTED
MLH1+MSH2+MSH6+PMS2 GN DEL+DUP+FUL M: NOT DETECTED

## 2024-05-02 ENCOUNTER — OFFICE VISIT (OUTPATIENT)
Dept: OBGYN CLINIC | Facility: CLINIC | Age: 44
End: 2024-05-02

## 2024-05-02 VITALS
DIASTOLIC BLOOD PRESSURE: 82 MMHG | WEIGHT: 168 LBS | SYSTOLIC BLOOD PRESSURE: 116 MMHG | HEIGHT: 65 IN | BODY MASS INDEX: 27.99 KG/M2

## 2024-05-02 DIAGNOSIS — Z98.890 POST-OPERATIVE STATE: Primary | ICD-10-CM

## 2024-05-02 PROCEDURE — 99024 POSTOP FOLLOW-UP VISIT: CPT | Performed by: OBSTETRICS & GYNECOLOGY

## 2024-05-02 NOTE — PROGRESS NOTES
Rober Fink is a 43 y.o.  female here for a postop visit.  Patient is 2 weeks post marsupialization of right recurrent Bartholin's gland cyst.  Patient is recovering well.  Did have some bleeding for a few days and then some itching and discomfort which is resolving still a little bit sore and some discharge but no other concerns.  Reviewed surgery and pathology with patient we discussed may be best to wait another couple weeks for intercourse.     Gynecologic History  Patient's last menstrual period was 2019 (exact date).  Contraception: status post hysterectomy  Last mammogram: . Results were: normal    Obstetric History  OB History    Para Term  AB Living   8 4 3 1 4 3   SAB IAB Ectopic Multiple Live Births   4     1 3      # Outcome Date GA Lbr Favio/2nd Weight Sex Delivery Anes PTL Lv   8 Term 17 38w5d  4139 g (9 lb 2 oz) M CS-LTranv Spinal  MAUREEN   7A  08/24/15 32w6d  2126 g (4 lb 11 oz) F CS-Unspec Spinal  MAUREEN   7B  08/24/15 32w6d  1928 g (4 lb 4 oz) F CS-Unspec Spinal  MAUREEN   6 Term            5 Term            4 SAB            3 SAB            2 SAB            1 SAB                  The following portions of the patient's history were reviewed and updated as appropriate: allergies, current medications, past family history, past medical history, past social history, past surgical history, and problem list.    Review of Systems  Review of Systems   Constitutional:  Negative for fatigue, fever and unexpected weight change.   HENT:  Negative for dental problem, sinus pressure and sinus pain.    Eyes:  Negative for visual disturbance.   Respiratory:  Negative for cough, shortness of breath and wheezing.    Cardiovascular:  Negative for chest pain and leg swelling.   Gastrointestinal:  Negative for blood in stool, constipation, diarrhea, nausea and vomiting.   Endocrine: Negative for cold intolerance, heat intolerance and polydipsia.   Genitourinary:   "Negative for dysuria, frequency, hematuria, menstrual problem and pelvic pain.   Musculoskeletal:  Negative for arthralgias and back pain.   Neurological:  Negative for dizziness, seizures and headaches.   Psychiatric/Behavioral:  The patient is not nervous/anxious.         Objective     /82 (BP Location: Right arm, Patient Position: Sitting)   Ht 5' 5\" (1.651 m)   Wt 76.2 kg (168 lb)   LMP 02/01/2019 (Exact Date)   BMI 27.96 kg/m²   General appearance: alert and oriented, in no acute distress  Head: Normocephalic, without obvious abnormality, atraumatic  Pelvic: external genitalia normal, vagina normal without discharge, and the marsupialization area is well-healed there is no visible change externally there is a 2 cm opening with some dissolving suture visible at the edges of the area healing well healthy tissue no erythema minimal creamy exudate  Extremities: extremities normal, warm and well-perfused; no cyanosis, clubbing, or edema      Assessment  43-year-old G8, P3 2 weeks post marsupialization steadily recovering     Plan  Patient will return to Dr. Ramsey for annual, patient asked and is welcome here when he retires to follow-up here as desired.  Encourage patient to message me at any point with any issues with her continued recovery  "

## 2024-08-25 DIAGNOSIS — F41.9 ANXIETY AND DEPRESSION: ICD-10-CM

## 2024-08-25 DIAGNOSIS — F32.A ANXIETY AND DEPRESSION: ICD-10-CM

## 2024-08-26 RX ORDER — ESCITALOPRAM OXALATE 10 MG/1
TABLET ORAL
Qty: 30 TABLET | Refills: 5 | Status: SHIPPED | OUTPATIENT
Start: 2024-08-26

## 2024-09-16 ENCOUNTER — OFFICE VISIT (OUTPATIENT)
Dept: FAMILY MEDICINE CLINIC | Facility: CLINIC | Age: 44
End: 2024-09-16
Payer: COMMERCIAL

## 2024-09-16 ENCOUNTER — APPOINTMENT (OUTPATIENT)
Dept: LAB | Facility: CLINIC | Age: 44
End: 2024-09-16
Payer: COMMERCIAL

## 2024-09-16 VITALS
BODY MASS INDEX: 29.02 KG/M2 | SYSTOLIC BLOOD PRESSURE: 116 MMHG | TEMPERATURE: 98 F | HEIGHT: 65 IN | WEIGHT: 174.2 LBS | RESPIRATION RATE: 16 BRPM | HEART RATE: 86 BPM | DIASTOLIC BLOOD PRESSURE: 82 MMHG | OXYGEN SATURATION: 97 %

## 2024-09-16 DIAGNOSIS — E78.5 DYSLIPIDEMIA: ICD-10-CM

## 2024-09-16 DIAGNOSIS — Z86.32 HISTORY OF GESTATIONAL DIABETES MELLITUS: ICD-10-CM

## 2024-09-16 DIAGNOSIS — F51.01 PRIMARY INSOMNIA: ICD-10-CM

## 2024-09-16 DIAGNOSIS — R63.5 WEIGHT GAIN: ICD-10-CM

## 2024-09-16 DIAGNOSIS — Z23 NEED FOR INFLUENZA VACCINATION: ICD-10-CM

## 2024-09-16 DIAGNOSIS — F32.A ANXIETY AND DEPRESSION: ICD-10-CM

## 2024-09-16 DIAGNOSIS — Z00.00 ENCOUNTER FOR WELLNESS EXAMINATION IN ADULT: Primary | ICD-10-CM

## 2024-09-16 DIAGNOSIS — F41.9 ANXIETY AND DEPRESSION: ICD-10-CM

## 2024-09-16 LAB
ALBUMIN SERPL BCG-MCNC: 4.3 G/DL (ref 3.5–5)
ALP SERPL-CCNC: 80 U/L (ref 34–104)
ALT SERPL W P-5'-P-CCNC: 18 U/L (ref 7–52)
ANION GAP SERPL CALCULATED.3IONS-SCNC: 8 MMOL/L (ref 4–13)
AST SERPL W P-5'-P-CCNC: 15 U/L (ref 13–39)
BASOPHILS # BLD AUTO: 0.05 THOUSANDS/ΜL (ref 0–0.1)
BASOPHILS NFR BLD AUTO: 1 % (ref 0–1)
BILIRUB SERPL-MCNC: 0.47 MG/DL (ref 0.2–1)
BUN SERPL-MCNC: 11 MG/DL (ref 5–25)
CALCIUM SERPL-MCNC: 8.9 MG/DL (ref 8.4–10.2)
CHLORIDE SERPL-SCNC: 104 MMOL/L (ref 96–108)
CHOLEST SERPL-MCNC: 250 MG/DL
CO2 SERPL-SCNC: 29 MMOL/L (ref 21–32)
CREAT SERPL-MCNC: 0.52 MG/DL (ref 0.6–1.3)
EOSINOPHIL # BLD AUTO: 0.07 THOUSAND/ΜL (ref 0–0.61)
EOSINOPHIL NFR BLD AUTO: 1 % (ref 0–6)
ERYTHROCYTE [DISTWIDTH] IN BLOOD BY AUTOMATED COUNT: 13.2 % (ref 11.6–15.1)
EST. AVERAGE GLUCOSE BLD GHB EST-MCNC: 117 MG/DL
GFR SERPL CREATININE-BSD FRML MDRD: 118 ML/MIN/1.73SQ M
GLUCOSE P FAST SERPL-MCNC: 102 MG/DL (ref 65–99)
HBA1C MFR BLD: 5.7 %
HCT VFR BLD AUTO: 43.5 % (ref 34.8–46.1)
HDLC SERPL-MCNC: 42 MG/DL
HGB BLD-MCNC: 14.3 G/DL (ref 11.5–15.4)
IMM GRANULOCYTES # BLD AUTO: 0 THOUSAND/UL (ref 0–0.2)
IMM GRANULOCYTES NFR BLD AUTO: 0 % (ref 0–2)
LDLC SERPL CALC-MCNC: 182 MG/DL (ref 0–100)
LYMPHOCYTES # BLD AUTO: 1.9 THOUSANDS/ΜL (ref 0.6–4.47)
LYMPHOCYTES NFR BLD AUTO: 37 % (ref 14–44)
MCH RBC QN AUTO: 28 PG (ref 26.8–34.3)
MCHC RBC AUTO-ENTMCNC: 32.9 G/DL (ref 31.4–37.4)
MCV RBC AUTO: 85 FL (ref 82–98)
MONOCYTES # BLD AUTO: 0.28 THOUSAND/ΜL (ref 0.17–1.22)
MONOCYTES NFR BLD AUTO: 6 % (ref 4–12)
NEUTROPHILS # BLD AUTO: 2.8 THOUSANDS/ΜL (ref 1.85–7.62)
NEUTS SEG NFR BLD AUTO: 55 % (ref 43–75)
NRBC BLD AUTO-RTO: 0 /100 WBCS
PLATELET # BLD AUTO: 266 THOUSANDS/UL (ref 149–390)
PMV BLD AUTO: 11.1 FL (ref 8.9–12.7)
POTASSIUM SERPL-SCNC: 3.9 MMOL/L (ref 3.5–5.3)
PROT SERPL-MCNC: 6.5 G/DL (ref 6.4–8.4)
RBC # BLD AUTO: 5.1 MILLION/UL (ref 3.81–5.12)
SODIUM SERPL-SCNC: 141 MMOL/L (ref 135–147)
TRIGL SERPL-MCNC: 129 MG/DL
TSH SERPL DL<=0.05 MIU/L-ACNC: 1.18 UIU/ML (ref 0.45–4.5)
WBC # BLD AUTO: 5.1 THOUSAND/UL (ref 4.31–10.16)

## 2024-09-16 PROCEDURE — 90656 IIV3 VACC NO PRSV 0.5 ML IM: CPT

## 2024-09-16 PROCEDURE — 85025 COMPLETE CBC W/AUTO DIFF WBC: CPT

## 2024-09-16 PROCEDURE — 80061 LIPID PANEL: CPT

## 2024-09-16 PROCEDURE — 80053 COMPREHEN METABOLIC PANEL: CPT

## 2024-09-16 PROCEDURE — 36415 COLL VENOUS BLD VENIPUNCTURE: CPT

## 2024-09-16 PROCEDURE — 99396 PREV VISIT EST AGE 40-64: CPT | Performed by: FAMILY MEDICINE

## 2024-09-16 PROCEDURE — 99213 OFFICE O/P EST LOW 20 MIN: CPT | Performed by: FAMILY MEDICINE

## 2024-09-16 PROCEDURE — 90471 IMMUNIZATION ADMIN: CPT

## 2024-09-16 PROCEDURE — 83036 HEMOGLOBIN GLYCOSYLATED A1C: CPT

## 2024-09-16 PROCEDURE — 84443 ASSAY THYROID STIM HORMONE: CPT

## 2024-09-16 RX ORDER — TRAZODONE HYDROCHLORIDE 50 MG/1
50 TABLET, FILM COATED ORAL
Qty: 30 TABLET | Refills: 1 | Status: SHIPPED | OUTPATIENT
Start: 2024-09-16

## 2024-09-16 NOTE — ASSESSMENT & PLAN NOTE
Orders:    traZODone (DESYREL) 50 mg tablet; Take 1 tablet (50 mg total) by mouth daily at bedtime as needed for sleep

## 2024-09-16 NOTE — PROGRESS NOTES
Ambulatory Visit  Name: Malou Fink      : 1980      MRN: [id number]  Encounter Provider: Marixa Billings MD  Encounter Date: 2024   Encounter department: [facility]    Assessment & Plan  Encounter for wellness examination in adult  Mammogram is scheduled.  Flu vaccine was administered today       Need for influenza vaccination    Orders:    Fluzone 0.5 mL IM    Weight gain  Weight gain in spite of active lifestyle.  Patient had does not have time for formal exercise.  We discussed the importance of low impact regular physical activity, 30 to 45 minutes 4 to 5 days/week.     Dyslipidemia    Orders:    CBC and differential; Future    Comprehensive metabolic panel; Future    Lipid Panel with Direct LDL reflex; Future    TSH, 3rd generation; Future    Primary insomnia    Orders:    traZODone (DESYREL) 50 mg tablet; Take 1 tablet (50 mg total) by mouth daily at bedtime as needed for sleep    History of gestational diabetes mellitus  Monitor hemoglobin A1c.  Patient reports recent weight gain.  Orders:    Hemoglobin A1C; Future    Anxiety and depression  Anxiety symptoms have been well-controlled on Lexapro 10 mg daily   Reports poor sleep.  Trial of trazodone 50 mg nightly as needed         Depression Screening and Follow-up Plan: Patient was screened for depression during today's encounter. They screened negative with a PHQ-9 score of 0.      Patient Instructions   Blood work   Consider metformin  Trial of Trazodone for sleep  Please move Lexapro to am  EXERCISE    History of Present Illness     Well exam  Recent weight gain of 15 lbs within past few months in spite of healthy diet and active lifestyle ( > 10,000 steps per day), no but formal exercise  Feels fatigued all the time  S/p hysterectomy 3/2021, left ovary  is intact  Occasional hot flashes  Sleep is poor-  trouble falling and staying asleep  Mammography is scheduled for 10/7/24  Concerned about  truncal obesity  Last  "set of labs 3/23  Stopped energy drinks and minimized soda.Trying  to eat early dinner  Personal history of GDM, required insulin for a few weeks.  Anxiety symptoms have been well-controlled on Lexapro 10 mg daily.          Review of Systems  Past Medical History:   Diagnosis Date    Anxiety     BRCA1 negative     BRCA2 negative     Chronic kidney disease     [id number]    Depression     Disease of thyroid gland     nodule    Endometriosis     Female infertility     iui with prior preg    Gastroduodenitis 2019    GERD (gastroesophageal reflux disease)     Gestational diabetes     Kidney stone     PONV (postoperative nausea and vomiting)     gets \"very sick\"    Renal calculi     last assessed 10/20/14; US 10/2014 3mm and 4mm right, non obstructing    Varicella     childhood     Past Surgical History:   Procedure Laterality Date    APPENDECTOMY       SECTION      CYSTOSCOPY N/A 3/1/2021    Procedure: CYSTOSCOPY;  Surgeon: Eliud Burch DO;  [facility];  Service: Gynecology    DIAGNOSTIC LAPAROSCOPY      multiple, last 10/2012, 10/2014    LAPAROSCOPIC OVARIAN CYSTECTOMY      LASER LAPAROSCOPY      NASAL SEPTUM SURGERY      AR  DELIVERY ONLY N/A 2017    Procedure:  SECTION () REPEAT;  Surgeon: Shyla Rich DO;  [facility];  Service: Obstetrics    AR LAPAROSCOPY SUPRACERVICAL HYSTERECTOMY 250 GM/< N/A 3/1/2021    Procedure: L.S.H., BILATERAL SALPINGECTOMY, RIGHT OOPHORECTOMY;  Surgeon: Eliud Burch DO;  [facility];  Service: Gynecology    AR LAPAROSCOPY SURG CHOLECYSTECTOMY N/A 2023    Procedure: CHOLECYSTECTOMY LAPAROSCOPIC;  Surgeon: Braydon Heaton DO;  [facility];  Service: General    AR LIG/TRNSXJ FALOPIAN TUBE  DEL/ABDML SURG Bilateral 2017    Procedure: LIGATION/COAGULATION TUBAL;  Surgeon: Shyla Rich DO;  [facility];  Service: Obstetrics    AR MARSUPIALIZATION BARTHOLINS " GLAND CYST Right 4/16/2024    Procedure: MARSUPILIZATION BARTHOLIN CYST, EUA;  Surgeon: Edwina Lynne MD;  [facility];  Service: Gynecology    TONSILLECTOMY      TUBAL LIGATION       Family History   Problem Relation Age of Onset    Depression Mother     Hypertension Mother     Miscarriages / Stillbirths Mother     Anxiety disorder Mother         NOS    Heart defect Mother         aortic valve disorder    Endometriosis Mother     Hyperlipidemia Father         high cholesterol    Breast cancer Sister 28    Cancer Sister         breast    Colon cancer Maternal Grandmother 68    Cancer Maternal Grandmother         colon    Depression Maternal Grandmother     Dementia Maternal Grandmother     Vision loss Maternal Grandmother     Arthritis Paternal Grandmother     Hearing loss Paternal Grandmother     Heart disease Paternal Grandmother     Stroke Paternal Grandmother     Miscarriages / Stillbirths Paternal Grandmother     Colon cancer Paternal Grandmother 72    Alcohol abuse Maternal Uncle     Breast cancer Maternal Aunt 58    No Known Problems Maternal Aunt     No Known Problems Maternal Aunt     No Known Problems Paternal Aunt      Social History     Tobacco Use    Smoking status: Former     Types: Cigarettes    Smokeless tobacco: Never   Vaping Use    Vaping status: Never Used   Substance and Sexual Activity    Alcohol use: No    Drug use: No    Sexual activity: Yes     Partners: Male     Birth control/protection: Female Sterilization     Current Outpatient Medications on File Prior to Visit   Medication Sig    escitalopram (LEXAPRO) 10 mg tablet TAKE 1 TABLET BY MOUTH EVERY DAY    fexofenadine (ALLEGRA) 180 MG tablet Take 180 mg by mouth if needed    fluticasone (FLONASE) 50 mcg/act nasal spray 2 sprays into each nostril daily (Patient taking differently: 2 sprays into each nostril if needed)    pseudoephedrine (SUDAFED) 120 MG 12 hr tablet Take 120 mg by mouth 2 (two) times a day PRN    ketoconazole  "(NIZORAL) 2 % cream Apply topically daily (Patient not taking: Reported on 3/18/2024)    methocarbamol (Robaxin-750) 750 mg tablet Take 1 tablet (750 mg total) by mouth daily at bedtime as needed for muscle spasms (Neck pain, tension headaches) (Patient not taking: Reported on 5/2/2024)    oxymetazoline (AFRIN) 0.05 % nasal spray 2 sprays by Each Nare route 2 (two) times a day (Patient not taking: Reported on 1/2/2024)     Allergies   Allergen Reactions    Cefprozil Shortness Of Breath    Pertussis Vaccine     Shellfish-Derived Products - Food Allergy Hives    Amoxicillin      Thrush,yeast infection     Immunization History   Administered Date(s) Administered    COVID-19 PFIZER VACCINE 0.3 ML IM 04/13/2021, 05/04/2021, 12/15/2021    DT (pediatric) 07/16/1984, 10/11/1993    DTaP 1980, 1980, 1980    DTaP 5 1982, 1982, 1982, 03/28/1983, 08/29/2008    INFLUENZA 10/10/2015, 10/08/2016, 10/17/2017    IPV 01/03/1983, 01/28/1983, 04/25/1983, 07/16/1984, 10/01/1987    Influenza Quadrivalent Preservative Free 3 years and older IM 10/10/2015, 10/08/2016    Influenza, Quadrivalent (nasal) 10/10/2015, 10/08/2016    Influenza, injectable, quadrivalent, preservative free 0.5 mL 10/29/2018, 10/08/2019, 10/24/2020, 11/15/2021, 11/13/2023    Influenza, seasonal, injectable, preservative free 09/16/2024    MMR 11/23/1983, 10/11/1993    Meningococcal MCV4, Unspecified 08/02/2000, 08/29/2008    Meningococcal MCV4P 08/02/2000, 08/29/2008    Meningococcal, Unknown Serogroups 08/02/2000, 08/29/2008    Mumps 08/22/1983    Rubella 11/23/1983, 09/21/1984    Tuberculin Skin Test-PPD Intradermal 02/06/2012     Objective     /82 (BP Location: Left arm, Patient Position: Sitting, Cuff Size: Standard)   Pulse 86   Temp 98 °F (36.7 °C) (Temporal)   Resp 16   Ht 5' 5\" (1.651 m)   Wt 79 kg (174 lb 3.2 oz)   LMP 02/01/2019 (Exact Date)   SpO2 97%   BMI 28.99 kg/m²     Physical Exam  Vitals and nursing " note reviewed.   Constitutional:       General: She is not in acute distress.     Appearance: Normal appearance. She is well-developed. She is not ill-appearing.   HENT:      Head: Normocephalic and atraumatic.   Eyes:      Conjunctiva/sclera: Conjunctivae normal.   Neck:      Thyroid: No thyromegaly.      Vascular: No carotid bruit.   Cardiovascular:      Rate and Rhythm: Normal rate and regular rhythm.      Heart sounds: Normal heart sounds. No murmur heard.  Pulmonary:      Effort: Pulmonary effort is normal. No respiratory distress.      Breath sounds: Normal breath sounds. No wheezing.   Abdominal:      General: Bowel sounds are normal. There is no distension or abdominal bruit.      Palpations: Abdomen is soft.      Tenderness: There is no abdominal tenderness.      Hernia: No hernia is present.   Musculoskeletal:         General: Normal range of motion.      Cervical back: Neck supple.      Right lower leg: No edema.      Left lower leg: No edema.   Neurological:      General: No focal deficit present.      Mental Status: She is alert and oriented to person, place, and time.      Cranial Nerves: No cranial nerve deficit.      Coordination: Coordination normal.   Psychiatric:         Mood and Affect: Mood normal.         Behavior: Behavior normal.         Thought Content: Thought content normal.

## 2024-09-16 NOTE — PATIENT INSTRUCTIONS
Blood work   Consider metformin  Trial of Trazodone for sleep  Please move Lexapro to am  EXERCISE

## 2024-09-18 PROBLEM — Z98.890 PONV (POSTOPERATIVE NAUSEA AND VOMITING): Status: RESOLVED | Noted: 2021-03-01 | Resolved: 2024-09-18

## 2024-09-18 PROBLEM — R11.2 PONV (POSTOPERATIVE NAUSEA AND VOMITING): Status: RESOLVED | Noted: 2021-03-01 | Resolved: 2024-09-18

## 2024-09-19 DIAGNOSIS — F32.A ANXIETY AND DEPRESSION: ICD-10-CM

## 2024-09-19 DIAGNOSIS — F41.9 ANXIETY AND DEPRESSION: ICD-10-CM

## 2024-09-19 RX ORDER — ESCITALOPRAM OXALATE 10 MG/1
TABLET ORAL
Qty: 90 TABLET | Refills: 1 | Status: SHIPPED | OUTPATIENT
Start: 2024-09-19

## 2024-09-19 NOTE — ASSESSMENT & PLAN NOTE
Anxiety symptoms have been well-controlled on Lexapro 10 mg daily   Reports poor sleep.  Trial of trazodone 50 mg nightly as needed

## 2024-09-20 DIAGNOSIS — E78.5 DYSLIPIDEMIA: Primary | ICD-10-CM

## 2024-09-20 DIAGNOSIS — R73.02 IGT (IMPAIRED GLUCOSE TOLERANCE): ICD-10-CM

## 2024-09-20 RX ORDER — METFORMIN HCL 500 MG
TABLET, EXTENDED RELEASE 24 HR ORAL
Qty: 90 TABLET | Refills: 5 | Status: SHIPPED | OUTPATIENT
Start: 2024-09-20

## 2024-10-07 ENCOUNTER — HOSPITAL ENCOUNTER (OUTPATIENT)
Facility: HOSPITAL | Age: 44
Discharge: HOME | End: 2024-10-07
Payer: COMMERCIAL

## 2024-10-07 DIAGNOSIS — Z12.31 ENCOUNTER FOR SCREENING MAMMOGRAM FOR BREAST CANCER: ICD-10-CM

## 2024-10-07 PROCEDURE — 77067 SCR MAMMO BI INCL CAD: CPT

## 2024-10-07 PROCEDURE — 77063 BREAST TOMOSYNTHESIS BI: CPT

## 2024-10-10 DIAGNOSIS — F51.01 PRIMARY INSOMNIA: ICD-10-CM

## 2024-10-11 RX ORDER — TRAZODONE HYDROCHLORIDE 50 MG/1
50 TABLET, FILM COATED ORAL
Qty: 90 TABLET | Refills: 1 | Status: SHIPPED | OUTPATIENT
Start: 2024-10-11

## 2024-10-13 DIAGNOSIS — R73.02 IGT (IMPAIRED GLUCOSE TOLERANCE): ICD-10-CM

## 2024-10-15 RX ORDER — METFORMIN HYDROCHLORIDE 500 MG/1
TABLET, EXTENDED RELEASE ORAL
Qty: 270 TABLET | Refills: 1 | Status: SHIPPED | OUTPATIENT
Start: 2024-10-15

## 2024-12-26 ENCOUNTER — OFFICE VISIT (OUTPATIENT)
Dept: URGENT CARE | Facility: CLINIC | Age: 44
End: 2024-12-26
Payer: COMMERCIAL

## 2024-12-26 VITALS
BODY MASS INDEX: 28.09 KG/M2 | TEMPERATURE: 97.9 F | WEIGHT: 168.6 LBS | OXYGEN SATURATION: 98 % | HEIGHT: 65 IN | DIASTOLIC BLOOD PRESSURE: 82 MMHG | RESPIRATION RATE: 16 BRPM | SYSTOLIC BLOOD PRESSURE: 122 MMHG | HEART RATE: 125 BPM

## 2024-12-26 DIAGNOSIS — J02.9 SORE THROAT: ICD-10-CM

## 2024-12-26 DIAGNOSIS — B34.9 VIRAL ILLNESS: Primary | ICD-10-CM

## 2024-12-26 LAB — S PYO AG THROAT QL: NEGATIVE

## 2024-12-26 PROCEDURE — S9083 URGENT CARE CENTER GLOBAL: HCPCS | Performed by: PHYSICIAN ASSISTANT

## 2024-12-26 PROCEDURE — 87070 CULTURE OTHR SPECIMN AEROBIC: CPT | Performed by: PHYSICIAN ASSISTANT

## 2024-12-26 PROCEDURE — G0382 LEV 3 HOSP TYPE B ED VISIT: HCPCS | Performed by: PHYSICIAN ASSISTANT

## 2024-12-26 PROCEDURE — 87880 STREP A ASSAY W/OPTIC: CPT | Performed by: PHYSICIAN ASSISTANT

## 2024-12-26 RX ORDER — METHYLPREDNISOLONE 4 MG/1
TABLET ORAL
Qty: 21 TABLET | Refills: 0 | Status: SHIPPED | OUTPATIENT
Start: 2024-12-26

## 2024-12-26 NOTE — PROGRESS NOTES
[facility]      NAME: Malou Fink is a 44 y.o. female  : 1980    MRN: [id number]  DATE: 2024  TIME: 8:42 AM    Assessment and Plan   Viral illness [B34.9]  1. Viral illness  methylPREDNISolone 4 MG tablet therapy pack      2. Sore throat  POCT rapid ANTIGEN strepA    Throat culture          Patient Instructions   Rapid strep test completed and negative. Will send for culture. Please check mychart for results.Infection appears viral.  Recommend symptomatic treatment.  Medrol pack  as prescribed.  Over the counter cough and cold medications to help with symptoms.  Use salt water gargles for sore throat and throat lozenges.  Cough drops as needed.  Wash hands frequently to prevent the spread of infection.  Symptoms may persist for 10-14 days.  Risks and benefits discussed. Patient understands and agrees with the plan.      If tests have been performed at [facility], our office will contact you with results if changes need to be made to the care plan discussed with you at the visit.  You can review your full results on [facility]'s MyChart.     Follow up with PCP in 3-5 days.      If any of the following occur, please report to your nearest ED for evaluation or call 911.   Difficultly breathing or shortness of breath  Chest pain  Acutely worsening symptoms.   To present to the ER if symptoms worsen.  Chief Complaint     Chief Complaint   Patient presents with    Cold Like Symptoms     Started Monday, progressively gotten worse, cough, nasal congestion, headache, ear pressure, and she's been taking OTC medications with no relief. She's concerned for a sinus infection          History of Present Illness   Malou Fink presents to the clinic c/o    Sinusitis  This is a new problem. The current episode started in the past 7 days (). The problem has been gradually worsening since onset. There has been no fever. Her pain is at a severity of 8/10. Associated symptoms include congestion,  coughing, sinus pressure and a sore throat. Pertinent negatives include no chills, diaphoresis, ear pain, headaches or shortness of breath. Past treatments include oral decongestants (sudafed, mucinex, theraflu). The treatment provided no relief.       Review of Systems   Review of Systems   Constitutional:  Positive for fever (low grade per pt). Negative for chills, diaphoresis and fatigue.   HENT:  Positive for congestion, postnasal drip, sinus pressure, sinus pain and sore throat. Negative for ear discharge, ear pain and facial swelling.    Eyes:  Negative for photophobia, pain, discharge, redness, itching and visual disturbance.   Respiratory:  Positive for cough. Negative for apnea, chest tightness, shortness of breath and wheezing.    Cardiovascular:  Negative for chest pain and palpitations.   Gastrointestinal:  Negative for abdominal pain, diarrhea and vomiting.   Skin:  Negative for color change, rash and wound.   Neurological:  Negative for dizziness and headaches.   Hematological:  Negative for adenopathy.         Current Medications     Long-Term Medications   Medication Sig Dispense Refill    escitalopram (LEXAPRO) 10 mg tablet TAKE 1 TABLET BY MOUTH EVERY DAY 90 tablet 1    fexofenadine (ALLEGRA) 180 MG tablet Take 180 mg by mouth if needed      fluticasone (FLONASE) 50 mcg/act nasal spray 2 sprays into each nostril daily (Patient taking differently: 2 sprays into each nostril if needed) 1 Bottle 0    ketoconazole (NIZORAL) 2 % cream Apply topically daily (Patient not taking: Reported on 3/18/2024) 60 g 0    metFORMIN (GLUCOPHAGE-XR) 500 mg 24 hr tablet TAKE 1 TABLET ONCE A DAY WITH DINNER FOR 5 DAYS, THEN INCREASE DOSE TO 2 TABLETS ONCE A DAY FOR 5 DAYS, THEN INCREASE DOSE TO 3 TABLETS DAILY. 270 tablet 1    methocarbamol (Robaxin-750) 750 mg tablet Take 1 tablet (750 mg total) by mouth daily at bedtime as needed for muscle spasms (Neck pain, tension headaches) (Patient not taking: Reported on  "2024) 30 tablet 0    oxymetazoline (AFRIN) 0.05 % nasal spray 2 sprays by Each Nare route 2 (two) times a day (Patient not taking: Reported on 2024) 30 mL 0    traZODone (DESYREL) 50 mg tablet TAKE 1 TABLET (50 MG TOTAL) BY MOUTH DAILY AT BEDTIME AS NEEDED FOR SLEEP 90 tablet 1       Current Allergies     Allergies as of 2024 - Reviewed 2024   Allergen Reaction Noted    Cefprozil Shortness Of Breath 05/15/2017    Pertussis vaccine      Shellfish-derived products - food allergy Hives 2017    Amoxicillin  2017            The following portions of the patient's history were reviewed and updated as appropriate: allergies, current medications, past family history, past medical history, past social history, past surgical history and problem list.  Past Medical History:   Diagnosis Date    Anxiety     BRCA1 negative     BRCA2 negative     Chronic kidney disease     [id number]    Depression     Disease of thyroid gland     nodule    Endometriosis     Female infertility     iui with prior preg    Gastroduodenitis 2019    GERD (gastroesophageal reflux disease)     Gestational diabetes     Kidney stone     PONV (postoperative nausea and vomiting)     gets \"very sick\"    Renal calculi     last assessed 10/20/14; US 10/2014 3mm and 4mm right, non obstructing    Varicella     childhood     Past Surgical History:   Procedure Laterality Date    APPENDECTOMY       SECTION      CYSTOSCOPY N/A 3/1/2021    Procedure: CYSTOSCOPY;  Surgeon: Eliud Burch DO;  Location: [facility] OR;  Service: Gynecology    DIAGNOSTIC LAPAROSCOPY      multiple, last 10/2012, 10/2014    LAPAROSCOPIC OVARIAN CYSTECTOMY      LASER LAPAROSCOPY      NASAL SEPTUM SURGERY      [place]  DELIVERY ONLY N/A 2017    Procedure:  SECTION () REPEAT;  Surgeon: Shyla Rich DO;  Location: [facility];  Service: Obstetrics    VT LAPAROSCOPY SUPRACERVICAL HYSTERECTOMY 250 GM/< N/A 3/1/2021    " "Procedure: L.S.H., BILATERAL SALPINGECTOMY, RIGHT OOPHORECTOMY;  Surgeon: Eliud Burch DO;  [facility];  Service: Gynecology    CT LAPAROSCOPY SURG CHOLECYSTECTOMY N/A 2023    Procedure: CHOLECYSTECTOMY LAPAROSCOPIC;  Surgeon: Braydon Heaton DO;  [facility];  Service: General    CT LIG/TRNSXJ FALOPIAN TUBE  DEL/ABDML SURG Bilateral 2017    Procedure: LIGATION/COAGULATION TUBAL;  Surgeon: Shyla Rich DO;  [facility];  Service: Obstetrics    CT MARSUPIALIZATION BARTHOLINS GLAND CYST Right 2024    Procedure: MARSUPILIZATION BARTHOLIN CYST, EUA;  Surgeon: Edwina Lynne MD;  Location: [facility];  Service: Gynecology    TONSILLECTOMY      TUBAL LIGATION       Social History     Socioeconomic History    Marital status: /Civil Union     Spouse name: Not on file    Number of children: Not on file    Years of education: Not on file    Highest education level: Not on file   Occupational History    Not on file   Tobacco Use    Smoking status: Former     Types: Cigarettes    Smokeless tobacco: Never   Vaping Use    Vaping status: Never Used   Substance and Sexual Activity    Alcohol use: No    Drug use: No    Sexual activity: Yes     Partners: Male     Birth control/protection: Female Sterilization   Other Topics Concern    Not on file   Social History Narrative    Not on file     Social Drivers of Health     Financial Resource Strain: Not on file   Food Insecurity: Not on file   Transportation Needs: Not on file   Physical Activity: Not on file   Stress: Not on file   Social Connections: Not on file   Intimate Partner Violence: Not on file   Housing Stability: Not on file       Objective   /82 (BP Location: Left arm, Patient Position: Sitting)   Pulse (!) 125   Temp 97.9 °F (36.6 °C) (Tympanic)   Resp 16   Ht 5' 5\" (1.651 m)   Wt 76.5 kg (168 lb 9.6 oz)   LMP 2019 (Exact Date)   SpO2 98%   BMI 28.06 kg/m²      Physical Exam "     Physical Exam  Vitals and nursing note reviewed.   Constitutional:       General: She is not in acute distress.     Appearance: She is well-developed. She is not diaphoretic.   HENT:      Head: Normocephalic and atraumatic.      Right Ear: Tympanic membrane and external ear normal.      Left Ear: Tympanic membrane and external ear normal.      Nose:      Right Sinus: Maxillary sinus tenderness present. No frontal sinus tenderness.      Left Sinus: Maxillary sinus tenderness present. No frontal sinus tenderness.      Mouth/Throat:      Mouth: Mucous membranes are moist.      Pharynx: No oropharyngeal exudate or posterior oropharyngeal erythema.   Eyes:      General: No scleral icterus.        Right eye: No discharge.         Left eye: No discharge.      Conjunctiva/sclera: Conjunctivae normal.   Cardiovascular:      Rate and Rhythm: Normal rate and regular rhythm.      Heart sounds: Normal heart sounds. No murmur heard.     No friction rub. No gallop.   Pulmonary:      Effort: Pulmonary effort is normal. No respiratory distress.      Breath sounds: Normal breath sounds. No decreased breath sounds, wheezing, rhonchi or rales.   Skin:     General: Skin is warm and dry.      Coloration: Skin is not pale.      Findings: No erythema or rash.   Neurological:      Mental Status: She is alert and oriented to person, place, and time.   Psychiatric:         Behavior: Behavior normal.         Thought Content: Thought content normal.         Judgment: Judgment normal.         Nathaly Hinojosa PA-C

## 2024-12-28 LAB — BACTERIA THROAT CULT: NORMAL

## 2025-01-02 ENCOUNTER — OFFICE VISIT (OUTPATIENT)
Dept: FAMILY MEDICINE CLINIC | Facility: CLINIC | Age: 45
End: 2025-01-02
Payer: COMMERCIAL

## 2025-01-02 VITALS
RESPIRATION RATE: 16 BRPM | WEIGHT: 170 LBS | OXYGEN SATURATION: 98 % | SYSTOLIC BLOOD PRESSURE: 122 MMHG | HEIGHT: 65 IN | HEART RATE: 102 BPM | BODY MASS INDEX: 28.32 KG/M2 | DIASTOLIC BLOOD PRESSURE: 78 MMHG | TEMPERATURE: 97.8 F

## 2025-01-02 DIAGNOSIS — J01.10 ACUTE NON-RECURRENT FRONTAL SINUSITIS: Primary | ICD-10-CM

## 2025-01-02 PROCEDURE — 99213 OFFICE O/P EST LOW 20 MIN: CPT | Performed by: FAMILY MEDICINE

## 2025-01-02 RX ORDER — AZITHROMYCIN 250 MG/1
TABLET, FILM COATED ORAL
Qty: 6 TABLET | Refills: 0 | Status: SHIPPED | OUTPATIENT
Start: 2025-01-02 | End: 2025-01-07

## 2025-01-02 NOTE — PROGRESS NOTES
"Name: Malou Fink      : 1980      MRN: [id number]  Encounter Provider: Carola Werner DO  Encounter Date: 2025   Encounter department: [facility]  :  Assessment & Plan  Acute non-recurrent frontal sinusitis  Symptoms ongoing for 10 days. Slightly with steroids but still having a lot of sinus pain and pressure. At this point abx are warranted. Start Azithromycin and would take Sudafed as well. Can continue Mucinex prn.   Orders:  •  azithromycin (Zithromax) 250 mg tablet; Take 2 tablets (500 mg total) by mouth daily for 1 day, THEN 1 tablet (250 mg total) daily for 4 days.           History of Present Illness     HPI  Here today for URI symptoms ongoing for 10 days now. She was seen at urgent care and was prescribed medrol dosepak which has relieved pressure somewhat but still having a lot of sinus pressure around the eyes, ear pain, congestion. Sore throat is improved. Green nasal discharge. No fever, chills, or myalgias. Has been taking Sudafed and Mucinex, Tylenol and Ibuprofen.     Review of Systems    Objective   /78   Pulse 102   Temp 97.8 °F (36.6 °C) (Temporal)   Resp 16   Ht 5' 5\" (1.651 m)   Wt 77.1 kg (170 lb)   LMP 2019 (Exact Date)   SpO2 98%   BMI 28.29 kg/m²      Physical Exam  Vitals reviewed.   Constitutional:       Appearance: Normal appearance.   HENT:      Right Ear: Tympanic membrane, ear canal and external ear normal.      Left Ear: Tympanic membrane, ear canal and external ear normal.      Nose: Nose normal.      Mouth/Throat:      Mouth: Mucous membranes are moist.      Pharynx: No posterior oropharyngeal erythema.      Comments: Postnasal drainage  Eyes:      Extraocular Movements: Extraocular movements intact.      Conjunctiva/sclera: Conjunctivae normal.   Cardiovascular:      Rate and Rhythm: Normal rate and regular rhythm.      Heart sounds: Normal heart sounds.   Pulmonary:      Effort: Pulmonary effort is normal.   Skin:     " General: Skin is warm and dry.   Neurological:      Mental Status: She is alert.   Psychiatric:         Mood and Affect: Mood normal.         Behavior: Behavior normal.

## 2025-01-13 ENCOUNTER — APPOINTMENT (EMERGENCY)
Dept: CT IMAGING | Facility: HOSPITAL | Age: 45
End: 2025-01-13
Payer: COMMERCIAL

## 2025-01-13 ENCOUNTER — APPOINTMENT (OUTPATIENT)
Dept: CT IMAGING | Facility: HOSPITAL | Age: 45
End: 2025-01-13
Payer: COMMERCIAL

## 2025-01-13 ENCOUNTER — HOSPITAL ENCOUNTER (OUTPATIENT)
Facility: HOSPITAL | Age: 45
Setting detail: OBSERVATION
Discharge: HOME | End: 2025-01-14
Attending: EMERGENCY MEDICINE | Admitting: INTERNAL MEDICINE
Payer: COMMERCIAL

## 2025-01-13 DIAGNOSIS — K85.90 ACUTE PANCREATITIS: Primary | ICD-10-CM

## 2025-01-13 DIAGNOSIS — E78.00 HYPERCHOLESTEREMIA: ICD-10-CM

## 2025-01-13 DIAGNOSIS — E78.5 HYPERLIPIDEMIA, UNSPECIFIED HYPERLIPIDEMIA TYPE: ICD-10-CM

## 2025-01-13 DIAGNOSIS — D72.829 LEUKOCYTOSIS: ICD-10-CM

## 2025-01-13 DIAGNOSIS — N20.0 BILATERAL NEPHROLITHIASIS: ICD-10-CM

## 2025-01-13 LAB
ALBUMIN SERPL BCG-MCNC: 4.1 G/DL (ref 3.5–5)
ALP SERPL-CCNC: 85 U/L (ref 34–104)
ALT SERPL W P-5'-P-CCNC: 15 U/L (ref 7–52)
ANION GAP SERPL CALCULATED.3IONS-SCNC: 7 MMOL/L (ref 4–13)
APAP SERPL-MCNC: 25 UG/ML (ref 10–20)
AST SERPL W P-5'-P-CCNC: 11 U/L (ref 13–39)
BACTERIA UR QL AUTO: ABNORMAL /HPF
BASOPHILS # BLD AUTO: 0.06 THOUSANDS/ΜL (ref 0–0.1)
BASOPHILS NFR BLD AUTO: 1 % (ref 0–1)
BILIRUB SERPL-MCNC: 0.46 MG/DL (ref 0.2–1)
BILIRUB UR QL STRIP: NEGATIVE
BUN SERPL-MCNC: 10 MG/DL (ref 5–25)
CALCIUM SERPL-MCNC: 9.1 MG/DL (ref 8.4–10.2)
CHLORIDE SERPL-SCNC: 106 MMOL/L (ref 96–108)
CHOLEST SERPL-MCNC: 263 MG/DL (ref ?–200)
CLARITY UR: CLEAR
CO2 SERPL-SCNC: 25 MMOL/L (ref 21–32)
COLOR UR: ABNORMAL
CREAT SERPL-MCNC: 0.62 MG/DL (ref 0.6–1.3)
EOSINOPHIL # BLD AUTO: 0.11 THOUSAND/ΜL (ref 0–0.61)
EOSINOPHIL NFR BLD AUTO: 1 % (ref 0–6)
ERYTHROCYTE [DISTWIDTH] IN BLOOD BY AUTOMATED COUNT: 13.2 % (ref 11.6–15.1)
ETHANOL SERPL-MCNC: <10 MG/DL
GFR SERPL CREATININE-BSD FRML MDRD: 111 ML/MIN/1.73SQ M
GLUCOSE SERPL-MCNC: 97 MG/DL (ref 65–140)
GLUCOSE UR STRIP-MCNC: NEGATIVE MG/DL
HCT VFR BLD AUTO: 44.8 % (ref 34.8–46.1)
HDLC SERPL-MCNC: 39 MG/DL
HGB BLD-MCNC: 15.1 G/DL (ref 11.5–15.4)
HGB UR QL STRIP.AUTO: ABNORMAL
IMM GRANULOCYTES # BLD AUTO: 0.04 THOUSAND/UL (ref 0–0.2)
IMM GRANULOCYTES NFR BLD AUTO: 0 % (ref 0–2)
KETONES UR STRIP-MCNC: NEGATIVE MG/DL
LDLC SERPL CALC-MCNC: 188 MG/DL (ref 0–100)
LEUKOCYTE ESTERASE UR QL STRIP: NEGATIVE
LIPASE SERPL-CCNC: 19 U/L (ref 11–82)
LYMPHOCYTES # BLD AUTO: 3.26 THOUSANDS/ΜL (ref 0.6–4.47)
LYMPHOCYTES NFR BLD AUTO: 27 % (ref 14–44)
MCH RBC QN AUTO: 28 PG (ref 26.8–34.3)
MCHC RBC AUTO-ENTMCNC: 33.7 G/DL (ref 31.4–37.4)
MCV RBC AUTO: 83 FL (ref 82–98)
MONOCYTES # BLD AUTO: 0.73 THOUSAND/ΜL (ref 0.17–1.22)
MONOCYTES NFR BLD AUTO: 6 % (ref 4–12)
MUCOUS THREADS UR QL AUTO: ABNORMAL
NEUTROPHILS # BLD AUTO: 7.87 THOUSANDS/ΜL (ref 1.85–7.62)
NEUTS SEG NFR BLD AUTO: 65 % (ref 43–75)
NITRITE UR QL STRIP: NEGATIVE
NON-SQ EPI CELLS URNS QL MICRO: ABNORMAL /HPF
NRBC BLD AUTO-RTO: 0 /100 WBCS
PH UR STRIP.AUTO: 5.5 [PH]
PLATELET # BLD AUTO: 325 THOUSANDS/UL (ref 149–390)
PMV BLD AUTO: 10.1 FL (ref 8.9–12.7)
POTASSIUM SERPL-SCNC: 3.8 MMOL/L (ref 3.5–5.3)
PROT SERPL-MCNC: 6.7 G/DL (ref 6.4–8.4)
PROT UR STRIP-MCNC: ABNORMAL MG/DL
RBC # BLD AUTO: 5.39 MILLION/UL (ref 3.81–5.12)
RBC #/AREA URNS AUTO: ABNORMAL /HPF
SALICYLATES SERPL-MCNC: <5 MG/DL (ref 3–20)
SODIUM SERPL-SCNC: 138 MMOL/L (ref 135–147)
SP GR UR STRIP.AUTO: 1.04 (ref 1–1.03)
TRIGL SERPL-MCNC: 182 MG/DL (ref ?–150)
UROBILINOGEN UR STRIP-ACNC: <2 MG/DL
WBC # BLD AUTO: 12.07 THOUSAND/UL (ref 4.31–10.16)
WBC #/AREA URNS AUTO: ABNORMAL /HPF

## 2025-01-13 PROCEDURE — 80061 LIPID PANEL: CPT | Performed by: PHYSICIAN ASSISTANT

## 2025-01-13 PROCEDURE — 80179 DRUG ASSAY SALICYLATE: CPT | Performed by: PHYSICIAN ASSISTANT

## 2025-01-13 PROCEDURE — 80143 DRUG ASSAY ACETAMINOPHEN: CPT | Performed by: PHYSICIAN ASSISTANT

## 2025-01-13 PROCEDURE — 82077 ASSAY SPEC XCP UR&BREATH IA: CPT | Performed by: PHYSICIAN ASSISTANT

## 2025-01-13 PROCEDURE — 85025 COMPLETE CBC W/AUTO DIFF WBC: CPT | Performed by: PHYSICIAN ASSISTANT

## 2025-01-13 PROCEDURE — 83690 ASSAY OF LIPASE: CPT | Performed by: PHYSICIAN ASSISTANT

## 2025-01-13 PROCEDURE — 81001 URINALYSIS AUTO W/SCOPE: CPT | Performed by: PHYSICIAN ASSISTANT

## 2025-01-13 PROCEDURE — 96365 THER/PROPH/DIAG IV INF INIT: CPT

## 2025-01-13 PROCEDURE — 96375 TX/PRO/DX INJ NEW DRUG ADDON: CPT

## 2025-01-13 PROCEDURE — 99285 EMERGENCY DEPT VISIT HI MDM: CPT | Performed by: PHYSICIAN ASSISTANT

## 2025-01-13 PROCEDURE — 99283 EMERGENCY DEPT VISIT LOW MDM: CPT

## 2025-01-13 PROCEDURE — 93005 ELECTROCARDIOGRAM TRACING: CPT

## 2025-01-13 PROCEDURE — 80053 COMPREHEN METABOLIC PANEL: CPT | Performed by: PHYSICIAN ASSISTANT

## 2025-01-13 PROCEDURE — 99222 1ST HOSP IP/OBS MODERATE 55: CPT | Performed by: NURSE PRACTITIONER

## 2025-01-13 PROCEDURE — 36415 COLL VENOUS BLD VENIPUNCTURE: CPT | Performed by: PHYSICIAN ASSISTANT

## 2025-01-13 PROCEDURE — 74176 CT ABD & PELVIS W/O CONTRAST: CPT

## 2025-01-13 PROCEDURE — 74177 CT ABD & PELVIS W/CONTRAST: CPT

## 2025-01-13 RX ORDER — ONDANSETRON 2 MG/ML
4 INJECTION INTRAMUSCULAR; INTRAVENOUS ONCE
Status: COMPLETED | OUTPATIENT
Start: 2025-01-13 | End: 2025-01-13

## 2025-01-13 RX ORDER — AMOXICILLIN 250 MG
2 CAPSULE ORAL
Status: DISCONTINUED | OUTPATIENT
Start: 2025-01-13 | End: 2025-01-14 | Stop reason: HOSPADM

## 2025-01-13 RX ORDER — KETOROLAC TROMETHAMINE 30 MG/ML
15 INJECTION, SOLUTION INTRAMUSCULAR; INTRAVENOUS ONCE
Status: COMPLETED | OUTPATIENT
Start: 2025-01-13 | End: 2025-01-13

## 2025-01-13 RX ORDER — ONDANSETRON 2 MG/ML
4 INJECTION INTRAMUSCULAR; INTRAVENOUS EVERY 8 HOURS PRN
Status: DISCONTINUED | OUTPATIENT
Start: 2025-01-13 | End: 2025-01-14 | Stop reason: HOSPADM

## 2025-01-13 RX ORDER — HYDROMORPHONE HCL/PF 1 MG/ML
0.5 SYRINGE (ML) INJECTION ONCE
Refills: 0 | Status: COMPLETED | OUTPATIENT
Start: 2025-01-13 | End: 2025-01-13

## 2025-01-13 RX ORDER — HYDROMORPHONE HCL IN WATER/PF 6 MG/30 ML
0.2 PATIENT CONTROLLED ANALGESIA SYRINGE INTRAVENOUS EVERY 6 HOURS PRN
Refills: 0 | Status: DISCONTINUED | OUTPATIENT
Start: 2025-01-13 | End: 2025-01-14 | Stop reason: HOSPADM

## 2025-01-13 RX ORDER — SODIUM CHLORIDE 9 MG/ML
125 INJECTION, SOLUTION INTRAVENOUS CONTINUOUS
Status: DISCONTINUED | OUTPATIENT
Start: 2025-01-13 | End: 2025-01-14

## 2025-01-13 RX ORDER — ACETAMINOPHEN 10 MG/ML
1000 INJECTION, SOLUTION INTRAVENOUS ONCE
Status: COMPLETED | OUTPATIENT
Start: 2025-01-13 | End: 2025-01-13

## 2025-01-13 RX ORDER — HYDROMORPHONE HCL/PF 1 MG/ML
0.5 SYRINGE (ML) INJECTION EVERY 6 HOURS PRN
Status: DISCONTINUED | OUTPATIENT
Start: 2025-01-13 | End: 2025-01-14 | Stop reason: HOSPADM

## 2025-01-13 RX ORDER — HYDROMORPHONE HCL IN WATER/PF 6 MG/30 ML
0.2 PATIENT CONTROLLED ANALGESIA SYRINGE INTRAVENOUS ONCE
Status: COMPLETED | OUTPATIENT
Start: 2025-01-13 | End: 2025-01-13

## 2025-01-13 RX ADMIN — SODIUM CHLORIDE 1000 ML: 0.9 INJECTION, SOLUTION INTRAVENOUS at 21:36

## 2025-01-13 RX ADMIN — ACETAMINOPHEN 1000 MG: 10 INJECTION INTRAVENOUS at 20:12

## 2025-01-13 RX ADMIN — IOHEXOL 85 ML: 350 INJECTION, SOLUTION INTRAVENOUS at 23:18

## 2025-01-13 RX ADMIN — ONDANSETRON 4 MG: 2 INJECTION, SOLUTION INTRAMUSCULAR; INTRAVENOUS at 20:12

## 2025-01-13 RX ADMIN — KETOROLAC TROMETHAMINE 15 MG: 30 INJECTION, SOLUTION INTRAMUSCULAR; INTRAVENOUS at 20:11

## 2025-01-13 RX ADMIN — HYDROMORPHONE HYDROCHLORIDE 0.2 MG: 0.2 INJECTION, SOLUTION INTRAMUSCULAR; INTRAVENOUS; SUBCUTANEOUS at 23:42

## 2025-01-13 RX ADMIN — SODIUM CHLORIDE 1000 ML: 0.9 INJECTION, SOLUTION INTRAVENOUS at 20:11

## 2025-01-13 RX ADMIN — HYDROMORPHONE HYDROCHLORIDE 0.5 MG: 1 INJECTION, SOLUTION INTRAMUSCULAR; INTRAVENOUS; SUBCUTANEOUS at 21:36

## 2025-01-13 RX ADMIN — SODIUM CHLORIDE 125 ML/HR: 0.9 INJECTION, SOLUTION INTRAVENOUS at 22:27

## 2025-01-13 RX ADMIN — HYDROMORPHONE HYDROCHLORIDE 0.2 MG: 0.2 INJECTION, SOLUTION INTRAMUSCULAR; INTRAVENOUS; SUBCUTANEOUS at 20:11

## 2025-01-14 ENCOUNTER — TRANSITIONAL CARE MANAGEMENT (OUTPATIENT)
Dept: FAMILY MEDICINE CLINIC | Facility: CLINIC | Age: 45
End: 2025-01-14

## 2025-01-14 VITALS
TEMPERATURE: 98 F | DIASTOLIC BLOOD PRESSURE: 76 MMHG | HEART RATE: 83 BPM | RESPIRATION RATE: 17 BRPM | SYSTOLIC BLOOD PRESSURE: 147 MMHG | OXYGEN SATURATION: 92 %

## 2025-01-14 PROBLEM — E78.2 MIXED HYPERLIPIDEMIA: Status: ACTIVE | Noted: 2025-01-14

## 2025-01-14 LAB
ALBUMIN SERPL BCG-MCNC: 3.2 G/DL (ref 3.5–5)
ALP SERPL-CCNC: 67 U/L (ref 34–104)
ALT SERPL W P-5'-P-CCNC: 13 U/L (ref 7–52)
ANION GAP SERPL CALCULATED.3IONS-SCNC: 3 MMOL/L (ref 4–13)
APAP SERPL-MCNC: 2 UG/ML (ref 10–20)
AST SERPL W P-5'-P-CCNC: 11 U/L (ref 13–39)
BASOPHILS # BLD AUTO: 0.04 THOUSANDS/ΜL (ref 0–0.1)
BASOPHILS NFR BLD AUTO: 1 % (ref 0–1)
BILIRUB SERPL-MCNC: 0.45 MG/DL (ref 0.2–1)
BUN SERPL-MCNC: 7 MG/DL (ref 5–25)
CALCIUM ALBUM COR SERPL-MCNC: 8.2 MG/DL (ref 8.3–10.1)
CALCIUM SERPL-MCNC: 7.6 MG/DL (ref 8.4–10.2)
CHLORIDE SERPL-SCNC: 109 MMOL/L (ref 96–108)
CO2 SERPL-SCNC: 26 MMOL/L (ref 21–32)
CREAT SERPL-MCNC: 0.41 MG/DL (ref 0.6–1.3)
EOSINOPHIL # BLD AUTO: 0.12 THOUSAND/ΜL (ref 0–0.61)
EOSINOPHIL NFR BLD AUTO: 1 % (ref 0–6)
ERYTHROCYTE [DISTWIDTH] IN BLOOD BY AUTOMATED COUNT: 13.2 % (ref 11.6–15.1)
GFR SERPL CREATININE-BSD FRML MDRD: 127 ML/MIN/1.73SQ M
GLUCOSE P FAST SERPL-MCNC: 94 MG/DL (ref 65–99)
GLUCOSE SERPL-MCNC: 94 MG/DL (ref 65–140)
HCT VFR BLD AUTO: 38.3 % (ref 34.8–46.1)
HGB BLD-MCNC: 12.5 G/DL (ref 11.5–15.4)
IMM GRANULOCYTES # BLD AUTO: 0.02 THOUSAND/UL (ref 0–0.2)
IMM GRANULOCYTES NFR BLD AUTO: 0 % (ref 0–2)
LYMPHOCYTES # BLD AUTO: 2.71 THOUSANDS/ΜL (ref 0.6–4.47)
LYMPHOCYTES NFR BLD AUTO: 33 % (ref 14–44)
MAGNESIUM SERPL-MCNC: 1.9 MG/DL (ref 1.9–2.7)
MCH RBC QN AUTO: 27.7 PG (ref 26.8–34.3)
MCHC RBC AUTO-ENTMCNC: 32.6 G/DL (ref 31.4–37.4)
MCV RBC AUTO: 85 FL (ref 82–98)
MONOCYTES # BLD AUTO: 0.51 THOUSAND/ΜL (ref 0.17–1.22)
MONOCYTES NFR BLD AUTO: 6 % (ref 4–12)
NEUTROPHILS # BLD AUTO: 4.94 THOUSANDS/ΜL (ref 1.85–7.62)
NEUTS SEG NFR BLD AUTO: 59 % (ref 43–75)
NRBC BLD AUTO-RTO: 0 /100 WBCS
PLATELET # BLD AUTO: 255 THOUSANDS/UL (ref 149–390)
PMV BLD AUTO: 10.5 FL (ref 8.9–12.7)
POTASSIUM SERPL-SCNC: 3.4 MMOL/L (ref 3.5–5.3)
PROT SERPL-MCNC: 5.2 G/DL (ref 6.4–8.4)
RBC # BLD AUTO: 4.52 MILLION/UL (ref 3.81–5.12)
SODIUM SERPL-SCNC: 138 MMOL/L (ref 135–147)
WBC # BLD AUTO: 8.34 THOUSAND/UL (ref 4.31–10.16)

## 2025-01-14 PROCEDURE — 80053 COMPREHEN METABOLIC PANEL: CPT | Performed by: NURSE PRACTITIONER

## 2025-01-14 PROCEDURE — 80143 DRUG ASSAY ACETAMINOPHEN: CPT | Performed by: NURSE PRACTITIONER

## 2025-01-14 PROCEDURE — 85025 COMPLETE CBC W/AUTO DIFF WBC: CPT | Performed by: NURSE PRACTITIONER

## 2025-01-14 PROCEDURE — 83735 ASSAY OF MAGNESIUM: CPT | Performed by: NURSE PRACTITIONER

## 2025-01-14 PROCEDURE — 99239 HOSP IP/OBS DSCHRG MGMT >30: CPT | Performed by: STUDENT IN AN ORGANIZED HEALTH CARE EDUCATION/TRAINING PROGRAM

## 2025-01-14 RX ORDER — ESCITALOPRAM OXALATE 10 MG/1
10 TABLET ORAL DAILY
Status: DISCONTINUED | OUTPATIENT
Start: 2025-01-14 | End: 2025-01-14 | Stop reason: HOSPADM

## 2025-01-14 RX ORDER — TRAZODONE HYDROCHLORIDE 50 MG/1
50 TABLET, FILM COATED ORAL
Status: DISCONTINUED | OUTPATIENT
Start: 2025-01-14 | End: 2025-01-14 | Stop reason: HOSPADM

## 2025-01-14 RX ORDER — LORATADINE 10 MG/1
10 TABLET ORAL DAILY PRN
Status: DISCONTINUED | OUTPATIENT
Start: 2025-01-14 | End: 2025-01-14 | Stop reason: HOSPADM

## 2025-01-14 RX ORDER — ATORVASTATIN CALCIUM 40 MG/1
40 TABLET, FILM COATED ORAL DAILY
Qty: 30 TABLET | Refills: 0 | Status: SHIPPED | OUTPATIENT
Start: 2025-01-14 | End: 2025-01-23

## 2025-01-14 RX ORDER — POTASSIUM CHLORIDE 1500 MG/1
40 TABLET, EXTENDED RELEASE ORAL ONCE
Status: COMPLETED | OUTPATIENT
Start: 2025-01-14 | End: 2025-01-14

## 2025-01-14 RX ADMIN — ONDANSETRON 4 MG: 2 INJECTION, SOLUTION INTRAMUSCULAR; INTRAVENOUS at 09:59

## 2025-01-14 RX ADMIN — TRAZODONE HYDROCHLORIDE 50 MG: 50 TABLET ORAL at 01:16

## 2025-01-14 RX ADMIN — POTASSIUM CHLORIDE 40 MEQ: 1500 TABLET, EXTENDED RELEASE ORAL at 09:59

## 2025-01-14 RX ADMIN — ESCITALOPRAM OXALATE 10 MG: 10 TABLET ORAL at 08:00

## 2025-01-14 RX ADMIN — ONDANSETRON 4 MG: 2 INJECTION, SOLUTION INTRAMUSCULAR; INTRAVENOUS at 01:16

## 2025-01-14 RX ADMIN — HYDROMORPHONE HYDROCHLORIDE 0.2 MG: 0.2 INJECTION, SOLUTION INTRAMUSCULAR; INTRAVENOUS; SUBCUTANEOUS at 00:49

## 2025-01-14 RX ADMIN — HYDROMORPHONE HYDROCHLORIDE 0.5 MG: 1 INJECTION, SOLUTION INTRAMUSCULAR; INTRAVENOUS; SUBCUTANEOUS at 07:59

## 2025-01-14 NOTE — H&P
"H&P - Hospitalist   Name: Malou Fink 44 y.o. female I MRN: [id number]  Unit/Bed#: OVR-03 I Date of Admission: 1/13/2025   Date of Service: 1/13/2025 I Hospital Day: 0     Assessment & Plan  Pancreatitis  CT: mild peripancreatic edema and fat stranding to suggest acute pancreatitis.  Small amount of fluid tracks along the retroperitoneum to pelvis.  Edema tracks along the portal vein to the ganga hepatis.  Post cholecystectomy without biliary dilation.   Was recently started on azithromycin  Lipase normal   Triglycerides 182   IV fluid  Pain control  IGT (impaired glucose tolerance)  Resume metformin on dc  Monitor glucose with am labs  Leukocytosis  WBC 12,000.  Possibly reactive.   Monitor cbc and temp curve      VTE Pharmacologic Prophylaxis: VTE Score: 2 Low Risk (Score 0-2) - Encourage Ambulation.  Code Status: Level 1 - Full Code   Discussion with family: Patient declined call to .     Anticipated Length of Stay: Patient will be admitted on an inpatient basis with an anticipated length of stay of greater than 2 midnights secondary to pain control.    History of Present Illness   Chief Complaint: flank pain    Malou Fink is a 44 y.o. female with a PMH of dyslipidemia, cholecystectomy, anxiety/depression, appendectomy, hysterectomy, bilateral salpingectomy, right oophorectomy, who presented with bilateral mid/lower back pain. She states that the pain woke her up at 3 am this morning and has progressively worsened since. She had been taking Tylenol at home for her symptoms and states that it initially \"took the edge off,\" but has since not been helping. No provoking factors for her symptoms. She states that the pain is mainly in her back, but states that it has been radiating around to involve her sides. She also admits to some epigastric pain worsened with deep inspiration. She states that with pain medication, her pain is a 3/10, but now has worsened to a 6/10. Denies fever, chills, chest " "pain, shortness of breath, vomiting, diarrhea, constipation, or urinary symptoms. Only admits to fatigue, some nausea associated with her pain, epigastric pain, and RLQ pain. She denies alcohol use and states that the only new medication she has been on lately has been azithromycin for a sinus infection.  Tylenol level is detectable however this was collected just after receiving IV tylenol and with normal LFTs.  Will recheck in AM.      Review of Systems   Constitutional:  Positive for fatigue. Negative for chills and fever.   HENT:  Negative for rhinorrhea and sore throat.    Respiratory:  Negative for cough and shortness of breath.    Cardiovascular:  Negative for chest pain.   Gastrointestinal:  Positive for abdominal pain and nausea. Negative for vomiting.   Genitourinary:  Negative for dysuria, frequency and urgency.   Musculoskeletal:  Positive for back pain (mid/lower back).   Neurological:  Negative for weakness, numbness and headaches.       Historical Information   Past Medical History:   Diagnosis Date    Anxiety     BRCA1 negative     BRCA2 negative     Chronic kidney disease     [id number]    Depression     Disease of thyroid gland     nodule    Endometriosis     Female infertility     iui with prior preg    Gastroduodenitis 2019    GERD (gastroesophageal reflux disease)     Gestational diabetes 2015    Kidney stone     PONV (postoperative nausea and vomiting)     gets \"very sick\"    Renal calculi     last assessed 10/20/14; US 10/2014 3mm and 4mm right, non obstructing    Varicella     childhood     Past Surgical History:   Procedure Laterality Date    APPENDECTOMY       SECTION      CYSTOSCOPY N/A 3/1/2021    Procedure: CYSTOSCOPY;  Surgeon: Eliud Burch DO;  Location: [facility] OR;  Service: Gynecology    DIAGNOSTIC LAPAROSCOPY      multiple, last 10/2012, 10/2014    LAPAROSCOPIC OVARIAN CYSTECTOMY      LASER LAPAROSCOPY      NASAL SEPTUM SURGERY      [place]  DELIVERY ONLY " N/A 2017    Procedure:  SECTION () REPEAT;  Surgeon: Shyla Rich DO;  [facility];  Service: Obstetrics    IL LAPAROSCOPY SUPRACERVICAL HYSTERECTOMY 250 GM/< N/A 3/1/2021    Procedure: L.S.H., BILATERAL SALPINGECTOMY, RIGHT OOPHORECTOMY;  Surgeon: Eliud Burch DO;  [facility];  Service: Gynecology    IL LAPAROSCOPY SURG CHOLECYSTECTOMY N/A 2023    Procedure: CHOLECYSTECTOMY LAPAROSCOPIC;  Surgeon: Braydon Heaton DO;  Location[facility];  Service: General    IL LIG/TRNSXJ FALOPIAN TUBE  DEL/ABDML SURG Bilateral 2017    Procedure: LIGATION/COAGULATION TUBAL;  Surgeon: Shyla Rich DO;  [facility];  Service: Obstetrics    IL MARSUPIALIZATION BARTHOLINS GLAND CYST Right 2024    Procedure: MARSUPILIZATION BARTHOLIN CYST, EUA;  Surgeon: Edwina Lynne MD;  [facility];  Service: Gynecology    TONSILLECTOMY      TUBAL LIGATION       Social History     Tobacco Use    Smoking status: Former     Types: Cigarettes    Smokeless tobacco: Never   Vaping Use    Vaping status: Never Used   Substance and Sexual Activity    Alcohol use: No    Drug use: No    Sexual activity: Yes     Partners: Male     Birth control/protection: Female Sterilization     E-Cigarette/Vaping    E-Cigarette Use Never User      E-Cigarette/Vaping Substances    Nicotine No     THC No     CBD No     Flavoring No     Other No     Unknown No      Family History   Problem Relation Age of Onset    Depression Mother     Hypertension Mother     Miscarriages / Stillbirths Mother     Anxiety disorder Mother         NOS    Heart defect Mother         aortic valve disorder    Endometriosis Mother     Hyperlipidemia Father         high cholesterol    Breast cancer Sister 28    Cancer Sister         breast    Colon cancer Maternal Grandmother 68    Cancer Maternal Grandmother         colon    Depression Maternal Grandmother     Dementia Maternal Grandmother      Vision loss Maternal Grandmother     Arthritis Paternal Grandmother     Hearing loss Paternal Grandmother     Heart disease Paternal Grandmother     Stroke Paternal Grandmother     Miscarriages / Stillbirths Paternal Grandmother     Colon cancer Paternal Grandmother 72    Alcohol abuse Maternal Uncle     Breast cancer Maternal Aunt 58    No Known Problems Maternal Aunt     No Known Problems Maternal Aunt     No Known Problems Paternal Aunt      Social History:  Marital Status: /Civil Union   Occupation:   Patient Pre-hospital Living Situation: Home  Patient Pre-hospital Level of Mobility: walks  Patient Pre-hospital Diet Restrictions:     Meds/Allergies   I have reviewed home medications with patient personally.  Prior to Admission medications    Medication Sig Start Date End Date Taking? Authorizing Provider   escitalopram (LEXAPRO) 10 mg tablet TAKE 1 TABLET BY MOUTH EVERY DAY 9/19/24   AQUILES Xiong   fexofenadine (ALLEGRA) 180 MG tablet Take 180 mg by mouth if needed    Historical Provider, MD   fluticasone (FLONASE) 50 mcg/act nasal spray 2 sprays into each nostril daily  Patient taking differently: 2 sprays into each nostril if needed 10/29/18   Marixa Billings MD   ketoconazole (NIZORAL) 2 % cream Apply topically daily 11/13/23   AQUILES Xiong   metFORMIN (GLUCOPHAGE-XR) 500 mg 24 hr tablet TAKE 1 TABLET ONCE A DAY WITH DINNER FOR 5 DAYS, THEN INCREASE DOSE TO 2 TABLETS ONCE A DAY FOR 5 DAYS, THEN INCREASE DOSE TO 3 TABLETS DAILY. 10/15/24   Marixa Billings MD   methocarbamol (Robaxin-750) 750 mg tablet Take 1 tablet (750 mg total) by mouth daily at bedtime as needed for muscle spasms (Neck pain, tension headaches)  Patient not taking: Reported on 1/2/2025 11/13/23   AQUILES Xiong   methylPREDNISolone 4 MG tablet therapy pack Use as directed on package  Patient not taking: Reported on 1/2/2025 12/26/24   Nathaly Hinojosa PA-C   oxymetazoline (AFRIN) 0.05 % nasal spray 2 sprays by Each Nare  route 2 (two) times a day 10/10/23   Shannon D Severino, PA-C   pseudoephedrine (SUDAFED) 120 MG 12 hr tablet Take 120 mg by mouth 2 (two) times a day PRN    Historical Provider, MD   traZODone (DESYREL) 50 mg tablet TAKE 1 TABLET (50 MG TOTAL) BY MOUTH DAILY AT BEDTIME AS NEEDED FOR SLEEP 10/11/24   Carola Werner, DO     Allergies   Allergen Reactions    Cefprozil Shortness Of Breath    Pertussis Vaccine     Shellfish-Derived Products - Food Allergy Hives    Amoxicillin      Thrush,yeast infection       Objective :  Temp:  [98.2 °F (36.8 °C)] 98.2 °F (36.8 °C)  HR:  [64] 64  BP: (136)/(75) 136/75  Resp:  [16] 16  SpO2:  [97 %] 97 %  O2 Device: None (Room air)    Physical Exam  Vitals reviewed.   Constitutional:       General: She is not in acute distress.     Appearance: Normal appearance. She is not ill-appearing.   HENT:      Head: Normocephalic and atraumatic.      Mouth/Throat:      Mouth: Mucous membranes are dry.   Eyes:      General: No scleral icterus.     Extraocular Movements: Extraocular movements intact.      Conjunctiva/sclera: Conjunctivae normal.   Cardiovascular:      Rate and Rhythm: Normal rate and regular rhythm.      Heart sounds: No murmur heard.     No friction rub. No gallop.   Pulmonary:      Effort: Pulmonary effort is normal.      Breath sounds: Normal breath sounds. No wheezing, rhonchi or rales.   Abdominal:      General: Bowel sounds are normal. There is no distension.      Palpations: Abdomen is soft.      Tenderness: There is abdominal tenderness (RLQ and epigastric areas). There is no right CVA tenderness or left CVA tenderness.   Musculoskeletal:         General: Normal range of motion.      Cervical back: Normal range of motion.   Skin:     General: Skin is warm and dry.      Capillary Refill: Capillary refill takes less than 2 seconds.   Neurological:      General: No focal deficit present.      Mental Status: She is alert and oriented to person, place, and time.    Psychiatric:         Mood and Affect: Mood normal.         Behavior: Behavior normal.        Lines/Drains:         Lab Results: I have reviewed the following results:  Results from last 7 days   Lab Units 01/13/25 2011   WBC Thousand/uL 12.07*   HEMOGLOBIN g/dL 15.1   HEMATOCRIT % 44.8   PLATELETS Thousands/uL 325   SEGS PCT % 65   LYMPHO PCT % 27   MONO PCT % 6   EOS PCT % 1     Results from last 7 days   Lab Units 01/13/25 2011   SODIUM mmol/L 138   POTASSIUM mmol/L 3.8   CHLORIDE mmol/L 106   CO2 mmol/L 25   BUN mg/dL 10   CREATININE mg/dL 0.62   ANION GAP mmol/L 7   CALCIUM mg/dL 9.1   ALBUMIN g/dL 4.1   TOTAL BILIRUBIN mg/dL 0.46   ALK PHOS U/L 85   ALT U/L 15   AST U/L 11*   GLUCOSE RANDOM mg/dL 97             Lab Results   Component Value Date    HGBA1C 5.7 (H) 09/16/2024    HGBA1C 5.2 02/23/2021    HGBA1C 5.2 08/15/2015           Imaging Results Review: I reviewed radiology reports from this admission including: CT abdomen/pelvis.  Other Study Results Review: No additional pertinent studies reviewed.    Administrative Statements   I have spent a total time of   minutes in caring for this patient on the day of the visit/encounter including Diagnostic results, Prognosis, Risks and benefits of tx options, Instructions for management, Patient and family education, Importance of tx compliance, Risk factor reductions, Impressions, Counseling / Coordination of care, Documenting in the medical record, Reviewing / ordering tests, medicine, procedures  , Obtaining or reviewing history  , and Communicating with other healthcare professionals .    ** Please Note: This note has been constructed using a voice recognition system. **

## 2025-01-14 NOTE — DISCHARGE SUMMARY
Discharge Summary - Hospitalist   Name: Malou Fink 44 y.o. female I MRN: [id number]  Unit/Bed#: -01 I Date of Admission: 1/13/2025   Date of Service: 1/14/2025 I Hospital Day: 0     Assessment & Plan  Pancreatitis  CT: mild peripancreatic edema and fat stranding to suggest acute pancreatitis.  Small amount of fluid tracks along the retroperitoneum to pelvis.  Edema tracks along the portal vein to the ganga hepatis.  Post cholecystectomy without biliary dilation.   Was recently started on azithromycin  Lipase normal   Triglycerides 182   IV fluid  Pain control  IGT (impaired glucose tolerance)  Resume metformin on dc  Monitor glucose with am labs  Leukocytosis  WBC 12,000.  Possibly reactive.   Monitor cbc and temp curve  Resolved  Mixed hyperlipidemia   Latest Reference Range & Units 01/13/25 20:11   Cholesterol See Comment mg/dL 263 (H)   Triglycerides See Comment mg/dL 182 (H)   HDL >=50 mg/dL 39 (L)   LDL Calculated 0 - 100 mg/dL 188 (H)   (H): Data is abnormally high  (L): Data is abnormally low    Started on Lipitor 40 mg daily      Medical Problems       Resolved Problems  Date Reviewed: 1/13/2025   None       Discharging Physician / Practitioner: Reshma Hernandez DO  PCP: Marixa Billings MD  Admission Date:   Admission Orders (From admission, onward)       Ordered        01/13/25 2139  Place in Observation  Once                          Discharge Date: 01/14/25    Consultations During Hospital Stay:  None    Procedures Performed:   N/a    Significant Findings / Test Results:   CT abd/pelvis: Stable findings compatible with acute pancreatitis. No evidence of parenchymal necrosis.     Incidental Findings:   See above   I reviewed the above mentioned incidental findings with the patient and/or family and they expressed understanding.    Test Results Pending at Discharge (will require follow up):   A1C     Outpatient Tests Requested:  Monitor lipids  Gi referral made on d/c    Complications:   "none    Reason for Admission: Pancreatitis    Chief Complaint: flank pain     HPI: Malou Fink is a 44 y.o. female with a PMH of dyslipidemia, cholecystectomy, anxiety/depression, appendectomy, hysterectomy, bilateral salpingectomy, right oophorectomy, who presented with bilateral mid/lower back pain. She states that the pain woke her up at 3 am this morning and has progressively worsened since. She had been taking Tylenol at home for her symptoms and states that it initially \"took the edge off,\" but has since not been helping. No provoking factors for her symptoms. She states that the pain is mainly in her back, but states that it has been radiating around to involve her sides. She also admits to some epigastric pain worsened with deep inspiration. She states that with pain medication, her pain is a 3/10, but now has worsened to a 6/10. Denies fever, chills, chest pain, shortness of breath, vomiting, diarrhea, constipation, or urinary symptoms. Only admits to fatigue, some nausea associated with her pain, epigastric pain, and RLQ pain. She denies alcohol use and states that the only new medication she has been on lately has been azithromycin for a sinus infection.  Tylenol level is detectable however this was collected just after receiving IV tylenol and with normal LFTs.  Will recheck in AM.    Hospital Course:   Malou Fink is a 44 y.o. female patient who originally presented to the hospital on 1/13/2025 due to acute pancreatitis.  Patient was treated conservatively with IV fluids, placed on clear liquid diet, and pain medication.  Symptoms improved on hospital day 1 and diet was advanced.  Patient tolerated her lunch and is being discharged home with outpatient GI referral for pancreatitis.  Has been started on Lipitor 40 mg daily for elevated cholesterol levels on lipid panel.           Please see above list of diagnoses and related plan for additional information.     Condition at Discharge: " stable    Discharge Day Visit / Exam:   * Please refer to separate progress note for these details *    Discussion with Family: Patient declined call to .     Discharge instructions/Information to patient and family:   See after visit summary for information provided to patient and family.      Provisions for Follow-Up Care:  See after visit summary for information related to follow-up care and any pertinent home health orders.      Mobility at time of Discharge:   Basic Mobility Inpatient Raw Score: 24  JH-HLM Goal: 8: Walk 250 feet or more  JH-HLM Achieved: 8: Walk 250 feet ot more  HLM Goal achieved. Continue to encourage appropriate mobility.     Disposition:   Home    Planned Readmission: no    Discharge Medications:  See after visit summary for reconciled discharge medications provided to patient and/or family.      Administrative Statements   Discharge Statement:  I have spent a total time of 35 minutes in caring for this patient on the day of the visit/encounter. >30 minutes of time was spent on: Diagnostic results, Prognosis, Risks and benefits of tx options, Instructions for management, and Patient and family education.    **Please Note: This note may have been constructed using a voice recognition system**

## 2025-01-14 NOTE — UTILIZATION REVIEW
"Initial Clinical Review    Admission: Date/Time/Statement:   Admission Orders (From admission, onward)       Ordered        01/13/25 2139  Place in Observation  Once                          Orders Placed This Encounter   Procedures    Place in Observation     Standing Status:   Standing     Number of Occurrences:   1     Level of Care:   Med Surg [16]     ED Arrival Information       Expected   -    Arrival   1/13/2025 18:19    Acuity   Urgent              Means of arrival   Walk-In    Escorted by   Family Member    Service   Hospitalist    Admission type   Emergency              Arrival complaint   back pain             Chief Complaint   Patient presents with    Back Pain     Pt reports bilateral flank pain, states feels internal, pain has been ongoing since 0300, denies pain radiation, nausea from pain, denies  symptoms       Initial Presentation: 44 y.o. female with a PMH of dyslipidemia, cholecystectomy, anxiety/depression, appendectomy, hysterectomy, bilateral salpingectomy, right oophorectomy, who presented with bilateral mid/lower back pain. She states that the pain woke her up at 3 am this morning and has progressively worsened since. She had been taking Tylenol at home for her symptoms and states that it initially \"took the edge off,\" but has since not been helping. No provoking factors for her symptoms. She states that the pain is mainly in her back, but states that it has been radiating around to involve her sides. She also admits to some epigastric pain worsened with deep inspiration. She states that with pain medication, her pain is a 3/10, but now has worsened to a 6/10. Plan: Observation for pancreatitis, impaired glucose tolerance, leukocytosis: IV fluids, pain control, monitor glucose in am, follow CBC.     ED Treatment-Medication Administration from 01/13/2025 1819 to 01/13/2025 2339         Date/Time Order Dose Route Action     01/13/2025 2011 sodium chloride 0.9 % bolus 1,000 mL 1,000 mL " Intravenous New Bag     01/13/2025 2011 ketorolac (TORADOL) injection 15 mg 15 mg Intravenous Given     01/13/2025 2011 HYDROmorphone HCl (DILAUDID) injection 0.2 mg 0.2 mg Intravenous Given     01/13/2025 2012 acetaminophen (Ofirmev) injection 1,000 mg 1,000 mg Intravenous New Bag     01/13/2025 2012 ondansetron (ZOFRAN) injection 4 mg 4 mg Intravenous Given     01/13/2025 2136 sodium chloride 0.9 % bolus 1,000 mL 1,000 mL Intravenous New Bag     01/13/2025 2136 HYDROmorphone (DILAUDID) injection 0.5 mg 0.5 mg Intravenous Given     01/13/2025 2227 sodium chloride 0.9 % infusion 125 mL/hr Intravenous New Bag     01/13/2025 2318 iohexol (OMNIPAQUE) 350 MG/ML injection (MULTI-DOSE) 85 mL 85 mL Intravenous Given            Scheduled Medications:  escitalopram, 10 mg, Oral, Daily      Continuous IV Infusions:  sodium chloride, 125 mL/hr, Intravenous, Continuous      PRN Meds:  HYDROmorphone, 0.5 mg, Intravenous, Q6H PRN  HYDROmorphone, 0.2 mg, Intravenous, Q6H PRN  HYDROmorphone, 0.2 mg, Intravenous, Q6H PRN  loratadine, 10 mg, Oral, Daily PRN  ondansetron, 4 mg, Intravenous, Q8H PRN  senna-docusate sodium, 2 tablet, Oral, HS PRN  traZODone, 50 mg, Oral, HS PRN      ED Triage Vitals [01/13/25 1845]   Temperature Pulse Respirations Blood Pressure SpO2 Pain Score   98.2 °F (36.8 °C) 64 16 136/75 97 % 10 - Worst Possible Pain     Weight (last 2 days)       None            Vital Signs (last 3 days)       Date/Time Temp Pulse Resp BP MAP (mmHg) SpO2 O2 Device Patient Position - Orthostatic VS [place] Coma Scale Score Pain    01/14/25 0759 -- -- -- -- -- -- -- -- -- 7    01/14/25 0719 98 °F (36.7 °C) 83 17 147/76 99 92 % None (Room air) Lying -- 8    01/14/25 0049 -- -- -- -- -- -- -- -- -- 7 01/14/25 0000 -- -- -- -- -- -- -- -- -- 7 01/13/25 2345 98 °F (36.7 °C) 92 19 128/67 91 94 % None (Room air) Sitting -- --    01/13/25 2141 -- -- -- -- -- -- -- -- 15 --    01/13/25 2116 -- -- -- -- -- -- None (Room air) -- 15 --     01/13/25 1845 98.2 °F (36.8 °C) 64 16 136/75 97 97 % None (Room air) Sitting -- 10 - Worst Possible Pain              Pertinent Labs/Diagnostic Test Results:   Radiology:  CT abdomen pelvis w contrast   Final Interpretation by Chandra Santos MD (01/14 0004)      Stable findings compatible with acute pancreatitis. No evidence of parenchymal necrosis.      Stable fluid in the mid abdomen without evidence of fluid collection.         Workstation performed: [id number]         CT renal stone study abdomen pelvis without contrast   Final Interpretation by Jaswant Joseph DO (01/13 2037)      Peripancreatic fluid/inflammation in keeping with acute pancreatitis. Cannot determine presence of parenchymal necrosis without IV contrast.      Small nonobstructing bilateral renal calculi.         Workstation performed: [id number]           Cardiology:  ECG 12 lead    by Interface, Ris Results In (01/13 2332)        GI:  No orders to display           Results from last 7 days   Lab Units 01/14/25 0512 01/13/25 2011   WBC Thousand/uL 8.34 12.07*   HEMOGLOBIN g/dL 12.5 15.1   HEMATOCRIT % 38.3 44.8   PLATELETS Thousands/uL 255 325   TOTAL NEUT ABS Thousands/µL 4.94 7.87*         Results from last 7 days   Lab Units 01/14/25  0512 01/13/25 2011   SODIUM mmol/L 138 138   POTASSIUM mmol/L 3.4* 3.8   CHLORIDE mmol/L 109* 106   CO2 mmol/L 26 25   ANION GAP mmol/L 3* 7   BUN mg/dL 7 10   CREATININE mg/dL 0.41* 0.62   EGFR ml/min/1.73sq m 127 111   CALCIUM mg/dL 7.6* 9.1   MAGNESIUM mg/dL 1.9  --      Results from last 7 days   Lab Units 01/14/25  0512 01/13/25 2011   AST U/L 11* 11*   ALT U/L 13 15   ALK PHOS U/L 67 85   TOTAL PROTEIN g/dL 5.2* 6.7   ALBUMIN g/dL 3.2* 4.1   TOTAL BILIRUBIN mg/dL 0.45 0.46         Results from last 7 days   Lab Units 01/14/25  0512 01/13/25 2011   GLUCOSE RANDOM mg/dL 94 97           Results from last 7 days   Lab Units 01/13/25 2011   LIPASE u/L 19                 Results from last 7 days  "  Lab Units 01/13/25  0000   CLARITY UA  Clear   COLOR UA  Light Yellow   SPEC GRAV UA  1.040*   PH UA  5.5   GLUCOSE UA mg/dl Negative   KETONES UA mg/dl Negative   BLOOD UA  Trace*   PROTEIN UA mg/dl Trace*   NITRITE UA  Negative   BILIRUBIN UA  Negative   UROBILINOGEN UA (BE) mg/dl <2.0   LEUKOCYTES UA  Negative   WBC UA /hpf 1-2   RBC UA /hpf 1-2   BACTERIA UA /hpf None Seen   EPITHELIAL CELLS WET PREP /hpf Occasional   MUCUS THREADS  Moderate*                 Results from last 7 days   Lab Units 01/14/25  0512 01/13/25  2114   ETHANOL LVL mg/dL  --  <10   ACETAMINOPHEN LVL ug/mL 2* 25*   SALICYLATE LVL mg/dL  --  <5         Past Medical History:   Diagnosis Date    Anxiety     BRCA1 negative     BRCA2 negative     Chronic kidney disease     [id number]    Depression     Disease of thyroid gland     nodule    Endometriosis     Female infertility     iui with prior preg    Gastroduodenitis 6/8/2019    GERD (gastroesophageal reflux disease)     Gestational diabetes 2015    Kidney stone     PONV (postoperative nausea and vomiting)     gets \"very sick\"    Renal calculi     last assessed 10/20/14; US 10/2014 3mm and 4mm right, non obstructing    Varicella     childhood     Present on Admission:   IGT (impaired glucose tolerance)      Admitting Diagnosis: Acute pancreatitis [K85.90]  Back pain [M54.9]  Hypercholesteremia [E78.00]  Leukocytosis [D72.829]  Bilateral nephrolithiasis [N20.0]  Age/Sex: 44 y.o. female    Network Utilization Review Department  ATTENTION: Please call with any questions or concerns to [phone] and carefully listen to the prompts so that you are directed to the right person. All voicemails are confidential.   For Discharge needs, contact Care Management DC Support Team at [phone] opt. [phone]  Send all requests for admission clinical reviews, approved or denied determinations and any other requests to dedicated fax number below belonging to the campus where the patient is receiving treatment. " List of dedicated fax numbers for the Facilities:  FACILITY NAME UR FAX NUMBER   ADMISSION DENIALS (Administrative/Medical Necessity) [phone]   DISCHARGE SUPPORT TEAM (NETWORK) [phone]   PARENT CHILD HEALTH (Maternity/NICU/Pediatrics) [phone]   [facility] [phone]   [facility] [phone]   [facility] [phone]   [facility] [phone]   [facility] [phone]   [facility] [phone]   [facility] [phone]   [facility] [phone]   [facility] [phone]   [facility] [phone]   [facility] [phone]   [facility] [phone]

## 2025-01-14 NOTE — ED PROVIDER NOTES
Time reflects when diagnosis was documented in both MDM as applicable and the Disposition within this note       Time User Action Codes Description Comment    2025  9:37 PM Braydon Shea Mihaela [E78.00] Hypercholesteremia     2025  9:38 PM Braydon Shea Mihaela [D72.829] Leukocytosis     2025  9:39 PM Braydon Shea Mihaela [N20.0] Bilateral nephrolithiasis     2025  9:39 PM Braydon Shea Mihaela [K85.90] Acute pancreatitis     2025  1:42 AM Braydon Shea Modify [E78.00] Hypercholesteremia     2025  1:42 AM Braydon Shea Modify [K85.90] Acute pancreatitis           ED Disposition       ED Disposition   Admit    Condition   Stable    Date/Time     9:38 PM    Comment   Case was discussed with helena and the patient's admission status was agreed to be Admission Status: observation status to the service of Dr. Chiu, internal medicine .               Assessment & Plan       Medical Decision Making  Patient is a 44-year-old female with a history of kidney stones, GERD, surgical history of appendectomy,  section, cholecystectomy, hysterectomy, bilateral salpingectomy and right oophorectomy, presents to the emergency department with sharp shooting worsening right-sided flank pain with radiation to right lower quadrant of abdomen for 1 day.  Patient hemodynamically stable and afebrile  No sirs  Right CVA tenderness, right lower quadrant abdominal tenderness, no rebound, no guarding, no rigidity, no peritoneal signs.  Normal lipase, elevated cholesterol 263, elevated triglycerides 182, negative ethanol  Normal transaminases, normal kidney function; coma panel collected, acetaminophen level elevated, 1 g of acetaminophen delivered to patient in ED before lab; coma panel was drawn.  Patient denies recent history of acetaminophen/Tylenol usage, likely elevation in setting of recent livery of 1 g of IV Tylenol leukocytosis of 12.07, no bandemia  CT renal stone study abdomen pelvis without  "contrast-impression-\" Peripancreatic fluid/inflammation in keeping with acute pancreatitis. Cannot determine presence of parenchymal necrosis without IV contrast. Small nonobstructing bilateral renal calculi.\"  Patient did report upon reevaluation that she had some left upper quadrant abdominal pain symptoms that had subsided and had localized to right flank with radiation to right lower quadrant of abdomen.  Urinalysis with no UTI, trace occult blood with 1-2 RBCs on urinalysis.    Delivered to modal pain control consisting of Dilaudid, acetaminophen, Toradol in the emergency department; patient demonstrates decrease in presenting right flank pain ED symptomatology status post medication delivery  Ddx likely and not limited to nephrolithiasis, ureteral stone, acute pancreatitis, pyelonephritis, colitis  Will treat for acute pancreatitis  Discussion with internal medicine team and both agreed to place patient on inpatient observational status under the care of Dr. Chiu, internal medicine    Patient demonstrates verbal understanding of all clinical laboratory and imaging findings, admission instructions, and verbally agrees with current treatment plan with teach back    *Due to voice recognition software, sound alike and misspelled words may be contained in the documentation*    Amount and/or Complexity of Data Reviewed  Labs: ordered. Decision-making details documented in ED Course.  Radiology: ordered and independent interpretation performed. Decision-making details documented in ED Course.    Risk  Prescription drug management.  Decision regarding hospitalization.             Medications   sodium chloride 0.9 % infusion (125 mL/hr Intravenous New Bag 1/13/25 6839)   HYDROmorphone HCl (DILAUDID) injection 0.2 mg (has no administration in time range)   HYDROmorphone (DILAUDID) injection 0.5 mg (has no administration in time range)   HYDROmorphone HCl (DILAUDID) injection 0.2 mg (has no administration in time " range)   senna-docusate sodium (SENOKOT S) 8.6-50 mg per tablet 2 tablet (has no administration in time range)   sodium chloride 0.9 % bolus 1,000 mL (0 mL Intravenous Stopped 25)   ketorolac (TORADOL) injection 15 mg (15 mg Intravenous Given 25)   HYDROmorphone HCl (DILAUDID) injection 0.2 mg (0.2 mg Intravenous Given 25)   acetaminophen (Ofirmev) injection 1,000 mg (0 mg Intravenous Stopped 25)   ondansetron (ZOFRAN) injection 4 mg (4 mg Intravenous Given 25)   sodium chloride 0.9 % bolus 1,000 mL (0 mL Intravenous Stopped 25)   HYDROmorphone (DILAUDID) injection 0.5 mg (0.5 mg Intravenous Given 25)   iohexol (OMNIPAQUE) 350 MG/ML injection (MULTI-DOSE) 85 mL (85 mL Intravenous Given 25)       ED Risk Strat Scores                          SBIRT 22yo+      Flowsheet Row Most Recent Value   Initial Alcohol Screen: US AUDIT-C     1. How often do you have a drink containing alcohol? 0 Filed at: 2025   2. How many drinks containing alcohol do you have on a typical day you are drinking?  0 Filed at: 2025   3a. Male UNDER 65: How often do you have five or more drinks on one occasion? 0 Filed at: 2025   3b. FEMALE Any Age, or MALE 65+: How often do you have 4 or more drinks on one occassion? 0 Filed at: 2025   Audit-C Score 0 Filed at: 2025   MARIA ISABEL: How many times in the past year have you...    Used an illegal drug or used a prescription medication for non-medical reasons? Never Filed at: 2025                            History of Present Illness       Chief Complaint   Patient presents with    Back Pain     Pt reports bilateral flank pain, states feels internal, pain has been ongoing since 0300, denies pain radiation, nausea from pain, denies  symptoms     Patient is a 44-year-old female with a history of kidney stones, GERD, surgical history of appendectomy,   "section, cholecystectomy, hysterectomy, bilateral salpingectomy and right oophorectomy, presents to the emergency department with sharp shooting worsening right-sided flank pain with radiation to right lower quadrant of abdomen for 1 day.  Patient has associated symptomatology of nausea beginning with the coronary presentation of right-sided flank pain.  Patient denies red blood per urine.  Patient states that current symptoms resemble those previous experience when she had a kidney stone in the past.  Patient denies recent antibiotic use.  Patient with vaginal bleeding and vaginal discharge.  Patient denies palliative factors with provocative factors of pressure to right flank.  Patient denies noneffective treatment.  Patient denies fevers, chills, vomiting, diarrhea, constipation and urinary symptoms.  Patient denies recent fall recent trauma.  Patient denies sick contacts recent travel.  Patient denies chest pain and shortness of breath.    Past Medical History:   Diagnosis Date    Anxiety     BRCA1 negative     BRCA2 negative     Chronic kidney disease     [id number]    Depression     Disease of thyroid gland     nodule    Endometriosis     Female infertility     iui with prior preg    Gastroduodenitis 2019    GERD (gastroesophageal reflux disease)     Gestational diabetes     Kidney stone     PONV (postoperative nausea and vomiting)     gets \"very sick\"    Renal calculi     last assessed 10/20/14; US 10/2014 3mm and 4mm right, non obstructing    Varicella     childhood      Past Surgical History:   Procedure Laterality Date    APPENDECTOMY       SECTION      CYSTOSCOPY N/A 3/1/2021    Procedure: CYSTOSCOPY;  Surgeon: Eliud Burch DO;  Location: [facility];  Service: Gynecology    DIAGNOSTIC LAPAROSCOPY      multiple, last 10/2012, 10/2014    LAPAROSCOPIC OVARIAN CYSTECTOMY      LASER LAPAROSCOPY      NASAL SEPTUM SURGERY      [facility] N/A 2017    Procedure: "  SECTION () REPEAT;  Surgeon: Shyla Rich DO;  [facility]: [facility];  Service: Obstetrics    SD LAPAROSCOPY SUPRACERVICAL HYSTERECTOMY 250 GM/< N/A 3/1/2021    Procedure: L.S.H., BILATERAL SALPINGECTOMY, RIGHT OOPHORECTOMY;  Surgeon: Eliud Burch DO;  Location: [facility];  Service: Gynecology    SD LAPAROSCOPY SURG CHOLECYSTECTOMY N/A 2023    Procedure: CHOLECYSTECTOMY LAPAROSCOPIC;  Surgeon: Braydon Heaton DO;  [facility];  Service: General    SD LIG/TRNSXJ FALOPIAN TUBE  DEL/ABDML SURG Bilateral 2017    Procedure: LIGATION/COAGULATION TUBAL;  Surgeon: Shyla Rich DO;  [facility];  Service: Obstetrics    SD MARSUPIALIZATION BARTHOLINS GLAND CYST Right 2024    Procedure: MARSUPILIZATION BARTHOLIN CYST, EUA;  Surgeon: Edwina Lynne MD;  Location[facility];  Service: Gynecology    TONSILLECTOMY      TUBAL LIGATION        Family History   Problem Relation Age of Onset    Depression Mother     Hypertension Mother     Miscarriages / Stillbirths Mother     Anxiety disorder Mother         NOS    Heart defect Mother         aortic valve disorder    Endometriosis Mother     Hyperlipidemia Father         high cholesterol    Breast cancer Sister 28    Cancer Sister         breast    Colon cancer Maternal Grandmother 68    Cancer Maternal Grandmother         colon    Depression Maternal Grandmother     Dementia Maternal Grandmother     Vision loss Maternal Grandmother     Arthritis Paternal Grandmother     Hearing loss Paternal Grandmother     Heart disease Paternal Grandmother     Stroke Paternal Grandmother     Miscarriages / Stillbirths Paternal Grandmother     Colon cancer Paternal Grandmother 72    Alcohol abuse Maternal Uncle     Breast cancer Maternal Aunt 58    No Known Problems Maternal Aunt     No Known Problems Maternal Aunt     No Known Problems Paternal Aunt       Social History     Tobacco Use    Smoking status: Former      Types: Cigarettes    Smokeless tobacco: Never   Vaping Use    Vaping status: Never Used   Substance Use Topics    Alcohol use: No    Drug use: No      E-Cigarette/Vaping    E-Cigarette Use Never User       E-Cigarette/Vaping Substances    Nicotine No     THC No     CBD No     Flavoring No     Other No     Unknown No       I have reviewed and agree with the history as documented.       History provided by:  Patient   used: No    Back Pain  Associated symptoms: no abdominal pain, no chest pain, no dysuria, no fever, no headaches, no numbness and no weakness        Review of Systems   Constitutional:  Negative for activity change, appetite change, chills and fever.   HENT:  Negative for congestion, ear pain, postnasal drip, rhinorrhea, sinus pressure, sinus pain, sore throat and tinnitus.    Eyes:  Negative for photophobia, pain and visual disturbance.   Respiratory:  Negative for cough, chest tightness and shortness of breath.    Cardiovascular:  Negative for chest pain and palpitations.   Gastrointestinal:  Positive for nausea. Negative for abdominal pain, constipation, diarrhea and vomiting.   Genitourinary:  Positive for flank pain. Negative for difficulty urinating, dysuria, frequency, hematuria and urgency.   Musculoskeletal:  Negative for arthralgias, back pain, gait problem, neck pain and neck stiffness.   Skin:  Negative for color change, pallor and rash.   Allergic/Immunologic: Negative for environmental allergies and food allergies.   Neurological:  Negative for dizziness, seizures, syncope, weakness, numbness and headaches.   Psychiatric/Behavioral:  Negative for confusion.    All other systems reviewed and are negative.          Objective       ED Triage Vitals [01/13/25 1845]   Temperature Pulse Blood Pressure Respirations SpO2 Patient Position - Orthostatic VS   98.2 °F (36.8 °C) 64 136/75 16 97 % Sitting      Temp Source Heart Rate Source BP Location FiO2 (%) Pain Score    Oral  Monitor Right arm -- 10 - Worst Possible Pain      Vitals      Date and Time Temp Pulse SpO2 Resp BP Pain Score FACES Pain Rating User   01/14/25 0049 -- -- -- -- -- 7 -- KS   01/13/25 2345 98 °F (36.7 °C) 92 94 % 19 128/67 -- -- MJ   01/13/25 1845 98.2 °F (36.8 °C) 64 97 % 16 136/75 10 - Worst Possible Pain lower back -- TP            Physical Exam  Vitals and nursing note reviewed.   Constitutional:       General: She is awake.      Appearance: Normal appearance. She is well-developed. She is not ill-appearing, toxic-appearing or diaphoretic.      Comments: /75 (BP Location: Right arm)   Pulse 64   Temp 98.2 °F (36.8 °C) (Oral)   Resp 16   LMP 02/01/2019 (Exact Date)   SpO2 97%      HENT:      Head: Normocephalic and atraumatic.      Right Ear: Hearing and external ear normal. No decreased hearing noted. No drainage, swelling or tenderness. No mastoid tenderness.      Left Ear: Hearing and external ear normal. No decreased hearing noted. No drainage, swelling or tenderness. No mastoid tenderness.      Nose: Nose normal.      Mouth/Throat:      Lips: Pink.      Mouth: Mucous membranes are moist.      Pharynx: Oropharynx is clear. Uvula midline.   Eyes:      General: Lids are normal. Vision grossly intact.         Right eye: No discharge.         Left eye: No discharge.      Extraocular Movements: Extraocular movements intact.      Conjunctiva/sclera: Conjunctivae normal.      Pupils: Pupils are equal, round, and reactive to light.   Neck:      Vascular: No JVD.      Trachea: Trachea and phonation normal. No tracheal tenderness or tracheal deviation.   Cardiovascular:      Rate and Rhythm: Normal rate and regular rhythm.      Pulses: Normal pulses.           Radial pulses are 2+ on the right side and 2+ on the left side.        Posterior tibial pulses are 2+ on the right side and 2+ on the left side.      Heart sounds: Normal heart sounds.   Pulmonary:      Effort: Pulmonary effort is normal.      Breath  sounds: Normal breath sounds. No stridor. No decreased breath sounds, wheezing, rhonchi or rales.   Abdominal:      General: Abdomen is flat. Bowel sounds are normal. There is no distension.      Palpations: Abdomen is soft. Abdomen is not rigid.      Tenderness: There is abdominal tenderness in the right lower quadrant. There is right CVA tenderness. There is no left CVA tenderness, guarding or rebound.   Musculoskeletal:         General: Normal range of motion.      Cervical back: Full passive range of motion without pain, normal range of motion and neck supple. No rigidity. No spinous process tenderness or muscular tenderness. Normal range of motion.   Lymphadenopathy:      Head:      Right side of head: No submental, submandibular, tonsillar, preauricular, posterior auricular or occipital adenopathy.      Left side of head: No submental, submandibular, tonsillar, preauricular, posterior auricular or occipital adenopathy.      Cervical: No cervical adenopathy.      Right cervical: No superficial, deep or posterior cervical adenopathy.     Left cervical: No superficial, deep or posterior cervical adenopathy.   Skin:     General: Skin is warm.      Capillary Refill: Capillary refill takes less than 2 seconds.   Neurological:      General: No focal deficit present.      Mental Status: She is alert and oriented to person, place, and time.      GCS: GCS eye subscore is 4. GCS verbal subscore is 5. GCS motor subscore is 6.      Sensory: No sensory deficit.   Psychiatric:         Mood and Affect: Mood normal.         Speech: Speech normal.         Behavior: Behavior normal. Behavior is cooperative.         Thought Content: Thought content normal.         Judgment: Judgment normal.         Results Reviewed       Procedure Component Value Units Date/Time    UA w Reflex to Microscopic w Reflex to Culture [[id number]]  (Abnormal) Collected: 01/13/25    Lab Status: Final result Specimen: Urine, Clean Catch Updated: 01/13/25  2250     Color, UA Light Yellow     Clarity, UA Clear     Specific Gravity, UA 1.040     pH, UA 5.5     Leukocytes, UA Negative     Nitrite, UA Negative     Protein, UA Trace mg/dl      Glucose, UA Negative mg/dl      Ketones, UA Negative mg/dl      Urobilinogen, UA <2.0 mg/dl      Bilirubin, UA Negative     Occult Blood, UA Trace    Urine Microscopic [[id number]]  (Abnormal) Collected: 01/13/25    Lab Status: Final result Specimen: Urine, Clean Catch Updated: 01/13/25 2211     RBC, UA 1-2 /hpf      WBC, UA 1-2 /hpf      Epithelial Cells Occasional /hpf      Bacteria, UA None Seen /hpf      MUCUS THREADS Moderate    Salicylate level [[id number]]  (Normal) Collected: 01/13/25 2114    Lab Status: Final result Specimen: Blood from Arm, Right Updated: 01/13/25 2155     Salicylate Lvl <5 mg/dL     Acetaminophen level-If concentration is detectable, please discuss with medical  on call. [[id number]]  (Abnormal) Collected: 01/13/25 2114    Lab Status: Final result Specimen: Blood from Arm, Right Updated: 01/13/25 2144     Acetaminophen Level 25 ug/mL     Ethanol [[id number]]  (Normal) Collected: 01/13/25 2114    Lab Status: Final result Specimen: Blood from Arm, Right Updated: 01/13/25 2139     Ethanol Lvl <10 mg/dL     Lipid Panel with Direct LDL reflex [[id number]]  (Abnormal) Collected: 01/13/25 2011    Lab Status: Final result Specimen: Blood from Arm, Right Updated: 01/13/25 2111     Cholesterol 263 mg/dL      Triglycerides 182 mg/dL      HDL, Direct 39 mg/dL      LDL Calculated 188 mg/dL     Comprehensive metabolic panel [[id number]]  (Abnormal) Collected: 01/13/25 2011    Lab Status: Final result Specimen: Blood from Arm, Right Updated: 01/13/25 2037     Sodium 138 mmol/L      Potassium 3.8 mmol/L      Chloride 106 mmol/L      CO2 25 mmol/L      ANION GAP 7 mmol/L      BUN 10 mg/dL      Creatinine 0.62 mg/dL      Glucose 97 mg/dL      Calcium 9.1 mg/dL      AST 11 U/L      ALT 15 U/L      Alkaline Phosphatase  85 U/L      Total Protein 6.7 g/dL      Albumin 4.1 g/dL      Total Bilirubin 0.46 mg/dL      eGFR 111 ml/min/1.73sq m     Narrative:      National Kid[employer] guidelines for Chronic Kidney Disease (CKD):     Stage 1 with normal or high GFR (GFR > 90 mL/min/1.73 square meters)    Stage 2 Mild CKD (GFR = 60-89 mL/min/1.73 square meters)    Stage 3A Moderate CKD (GFR = 45-59 mL/min/1.73 square meters)    Stage 3B Moderate CKD (GFR = 30-44 mL/min/1.73 square meters)    Stage 4 Severe CKD (GFR = 15-29 mL/min/1.73 square meters)    Stage 5 End Stage CKD (GFR <15 mL/min/1.73 square meters)  Note: GFR calculation is accurate only with a steady state creatinine    Lipase [[id number]]  (Normal) Collected: 01/13/25 2011    Lab Status: Final result Specimen: Blood from Arm, Right Updated: 01/13/25 2037     Lipase 19 u/L     CBC and differential [[id number]]  (Abnormal) Collected: 01/13/25 2011    Lab Status: Final result Specimen: Blood from Arm, Right Updated: 01/13/25 2020     WBC 12.07 Thousand/uL      RBC 5.39 Million/uL      Hemoglobin 15.1 g/dL      Hematocrit 44.8 %      MCV 83 fL      MCH 28.0 pg      MCHC 33.7 g/dL      RDW 13.2 %      MPV 10.1 fL      Platelets 325 Thousands/uL      nRBC 0 /100 WBCs      Segmented % 65 %      Immature Grans % 0 %      Lymphocytes % 27 %      Monocytes % 6 %      Eosinophils Relative 1 %      Basophils Relative 1 %      Absolute Neutrophils 7.87 Thousands/µL      Absolute Immature Grans 0.04 Thousand/uL      Absolute Lymphocytes 3.26 Thousands/µL      Absolute Monocytes 0.73 Thousand/µL      Eosinophils Absolute 0.11 Thousand/µL      Basophils Absolute 0.06 Thousands/µL             CT abdomen pelvis w contrast   Final Interpretation by Chandra Snatos MD (01/14 0004)      Stable findings compatible with acute pancreatitis. No evidence of parenchymal necrosis.      Stable fluid in the mid abdomen without evidence of fluid collection.         Workstation performed:  [id number]         CT renal stone study abdomen pelvis without contrast   Final Interpretation by Jaswant Joseph DO (2037)      Peripancreatic fluid/inflammation in keeping with acute pancreatitis. Cannot determine presence of parenchymal necrosis without IV contrast.      Small nonobstructing bilateral renal calculi.         Workstation performed: [id number]             Procedures    ED Medication and Procedure Management   Prior to Admission Medications   Prescriptions Last Dose Informant Patient Reported? Taking?   escitalopram (LEXAPRO) 10 mg tablet   No No   Sig: TAKE 1 TABLET BY MOUTH EVERY DAY   fexofenadine (ALLEGRA) 180 MG tablet  Self Yes No   Sig: Take 180 mg by mouth if needed   fluticasone (FLONASE) 50 mcg/act nasal spray   No No   Si sprays into each nostril daily   Patient taking differently: 2 sprays into each nostril if needed   ketoconazole (NIZORAL) 2 % cream   No No   Sig: Apply topically daily   metFORMIN (GLUCOPHAGE-XR) 500 mg 24 hr tablet   No No   Sig: TAKE 1 TABLET ONCE A DAY WITH DINNER FOR 5 DAYS, THEN INCREASE DOSE TO 2 TABLETS ONCE A DAY FOR 5 DAYS, THEN INCREASE DOSE TO 3 TABLETS DAILY.   methocarbamol (Robaxin-750) 750 mg tablet   No No   Sig: Take 1 tablet (750 mg total) by mouth daily at bedtime as needed for muscle spasms (Neck pain, tension headaches)   Patient not taking: Reported on 2025   methylPREDNISolone 4 MG tablet therapy pack   No No   Sig: Use as directed on package   Patient not taking: Reported on 2025   oxymetazoline (AFRIN) 0.05 % nasal spray   No No   Si sprays by Each Nare route 2 (two) times a day   pseudoephedrine (SUDAFED) 120 MG 12 hr tablet  Self Yes No   Sig: Take 120 mg by mouth 2 (two) times a day PRN   traZODone (DESYREL) 50 mg tablet   No No   Sig: TAKE 1 TABLET (50 MG TOTAL) BY MOUTH DAILY AT BEDTIME AS NEEDED FOR SLEEP      Facility-Administered Medications: None     Current Discharge Medication List        CONTINUE these  medications which have NOT CHANGED    Details   escitalopram (LEXAPRO) 10 mg tablet TAKE 1 TABLET BY MOUTH EVERY DAY  Qty: 90 tablet, Refills: 1    Associated Diagnoses: Anxiety and depression      fexofenadine (ALLEGRA) 180 MG tablet Take 180 mg by mouth if needed      fluticasone (FLONASE) 50 mcg/act nasal spray 2 sprays into each nostril daily  Qty: 1 Bottle, Refills: 0    Associated Diagnoses: Recurrent sinus infections      ketoconazole (NIZORAL) 2 % cream Apply topically daily  Qty: 60 g, Refills: 0    Associated Diagnoses: Tinea versicolor      metFORMIN (GLUCOPHAGE-XR) 500 mg 24 hr tablet TAKE 1 TABLET ONCE A DAY WITH DINNER FOR 5 DAYS, THEN INCREASE DOSE TO 2 TABLETS ONCE A DAY FOR 5 DAYS, THEN INCREASE DOSE TO 3 TABLETS DAILY.  Qty: 270 tablet, Refills: 1    Associated Diagnoses: IGT (impaired glucose tolerance)      methocarbamol (Robaxin-750) 750 mg tablet Take 1 tablet (750 mg total) by mouth daily at bedtime as needed for muscle spasms (Neck pain, tension headaches)  Qty: 30 tablet, Refills: 0    Associated Diagnoses: Tension headache      methylPREDNISolone 4 MG tablet therapy pack Use as directed on package  Qty: 21 tablet, Refills: 0    Associated Diagnoses: Viral illness      oxymetazoline (AFRIN) 0.05 % nasal spray 2 sprays by Each Nare route 2 (two) times a day  Qty: 30 mL, Refills: 0    Associated Diagnoses: Acute non-recurrent pansinusitis      pseudoephedrine (SUDAFED) 120 MG 12 hr tablet Take 120 mg by mouth 2 (two) times a day PRN      traZODone (DESYREL) 50 mg tablet TAKE 1 TABLET (50 MG TOTAL) BY MOUTH DAILY AT BEDTIME AS NEEDED FOR SLEEP  Qty: 90 tablet, Refills: 1    Associated Diagnoses: Primary insomnia           No discharge procedures on file.  ED SEPSIS DOCUMENTATION   Time reflects when diagnosis was documented in both MDM as applicable and the Disposition within this note       Time User Action Codes Description Comment    1/13/2025  9:37 PM Braydon Shea Add [E78.00]  Hypercholesteremia     1/13/2025  9:38 PM Braydon Shea [D72.829] Leukocytosis     1/13/2025  9:39 PM Braydon Shea [N20.0] Bilateral nephrolithiasis     1/13/2025  9:39 PM Braydon Shea [K85.90] Acute pancreatitis     1/14/2025  1:42 AM Braydon Shea [E78.00] Hypercholesteremia     1/14/2025  1:42 AM Braydon Shea [K85.90] Acute pancreatitis                  TERESA Sethi-TOÑO  01/14/25 0150

## 2025-01-14 NOTE — PLAN OF CARE
Problem: PAIN - ADULT  Goal: Verbalizes/displays adequate comfort level or baseline comfort level  Description: Interventions:  - Encourage patient to monitor pain and request assistance  - Assess pain using appropriate pain scale  - Administer analgesics based on type and severity of pain and evaluate response  - Implement non-pharmacological measures as appropriate and evaluate response  - Consider cultural and social influences on pain and pain management  - Notify physician/advanced practitioner if interventions unsuccessful or patient reports new pain  Outcome: Progressing     Problem: INFECTION - ADULT  Goal: Absence or prevention of progression during hospitalization  Description: INTERVENTIONS:  - Assess and monitor for signs and symptoms of infection  - Monitor lab/diagnostic results  - Monitor all insertion sites, i.e. indwelling lines, tubes, and drains  - Monitor endotracheal if appropriate and nasal secretions for changes in amount and color  - Pep appropriate cooling/warming therapies per order  - Administer medications as ordered  - Instruct and encourage patient and family to use good hand hygiene technique  - Identify and instruct in appropriate isolation precautions for identified infection/condition  Outcome: Progressing  Goal: Absence of fever/infection during neutropenic period  Description: INTERVENTIONS:  - Monitor WBC    Outcome: Progressing     Problem: SAFETY ADULT  Goal: Patient will remain free of falls  Description: INTERVENTIONS:  - Educate patient/family on patient safety including physical limitations  - Instruct patient to call for assistance with activity   - Consult OT/PT to assist with strengthening/mobility   - Keep Call bell within reach  - Keep bed low and locked with side rails adjusted as appropriate  - Keep care items and personal belongings within reach  - Initiate and maintain comfort rounds  - Make Fall Risk Sign visible to staff  - Apply yellow socks and bracelet  for high fall risk patients  - Consider moving patient to room near nurses station  Outcome: Progressing  Goal: Maintain or return to baseline ADL function  Description: INTERVENTIONS:  -  Assess patient's ability to carry out ADLs; assess patient's baseline for ADL function and identify physical deficits which impact ability to perform ADLs (bathing, care of mouth/teeth, toileting, grooming, dressing, etc.)  - Assess/evaluate cause of self-care deficits   - Assess range of motion  - Assess patient's mobility; develop plan if impaired  - Assess patient's need for assistive devices and provide as appropriate  - Encourage maximum independence but intervene and supervise when necessary  - Involve family in performance of ADLs  - Assess for home care needs following discharge   - Consider OT consult to assist with ADL evaluation and planning for discharge  - Provide patient education as appropriate  Outcome: Progressing  Goal: Maintains/Returns to pre admission functional level  Description: INTERVENTIONS:  - Perform AM-PAC 6 Click Basic Mobility/ Daily Activity assessment daily.  - Set and communicate daily mobility goal to care team and patient/family/caregiver.   - Collaborate with rehabilitation services on mobility goals if consulted  - Out of bed for toileting  - Record patient progress and toleration of activity level   Outcome: Progressing     Problem: DISCHARGE PLANNING  Goal: Discharge to home or other facility with appropriate resources  Description: INTERVENTIONS:  - Identify barriers to discharge w/patient and caregiver  - Arrange for needed discharge resources and transportation as appropriate  - Identify discharge learning needs (meds, wound care, etc.)  - Arrange for interpretive services to assist at discharge as needed  - Refer to Case Management Department for coordinating discharge planning if the patient needs post-hospital services based on physician/advanced practitioner order or complex needs  related to functional status, cognitive ability, or social support system  Outcome: Progressing     Problem: Knowledge Deficit  Goal: Patient/family/caregiver demonstrates understanding of disease process, treatment plan, medications, and discharge instructions  Description: Complete learning assessment and assess knowledge base.  Interventions:  - Provide teaching at level of understanding  - Provide teaching via preferred learning methods  Outcome: Progressing

## 2025-01-14 NOTE — DISCHARGE INSTR - AVS FIRST PAGE
Dear Malou,    You are being discharged with Lipitor 40 mg daily for elevated cholesterol levels  This should also bring down triglycerides which can cause pancreatitis at times    I made a referral for you to see gastroenterology on discharge for pancreatitis

## 2025-01-14 NOTE — DISCHARGE INSTRUCTIONS
Reading Physician Reading Date Result Priority   Jaswant Zachariah Joseph,   [phone]     1/13/2025      Narrative & Impression  CT ABDOMEN AND PELVIS WITHOUT IV CONTRAST - LOW DOSE RENAL STONE     INDICATION: Right flank pain with radiation to right lower quadrant of abdomen.     COMPARISON: CT abdomen and pelvis 12/18/2020     TECHNIQUE: Low radiation dose thin section CT examination of the abdomen and pelvis was performed without intravenous or oral contrast according to a protocol specifically designed to evaluate for urinary tract calculus. Axial, sagittal, and coronal 2D   reformatted images were created from the source data and submitted for interpretation. Evaluation for pathology in the abdomen and pelvis that is unrelated to urinary tract calculi is limited.     Radiation dose length product (DLP) for this visit: 178 mGy-cm. This examination, like all CT scans performed in the [facility], was performed utilizing techniques to minimize radiation dose exposure, including the use of iterative   reconstruction and automated exposure control.     URINARY TRACT FINDINGS:     RIGHT KIDNEY AND URETER: A few nonobstructing calculi measuring up to 4 mm in the midpole. No hydronephrosis or hydroureter.     LEFT KIDNEY AND URETER: Punctate nonobstructing lower pole calculus. No hydronephrosis or hydroureter.     URINARY BLADDER: Unremarkable.        ADDITIONAL FINDINGS:     LOWER CHEST: No clinically significant abnormality in the visualized lower chest.     SOLID VISCERA: Please note the upper liver was not fully included on axial images. Limited low radiation dose noncontrast CT evaluation demonstrates no clinically significant abnormality of the imaged portions of the liver, spleen or adrenal glands.     Mild peripancreatic edema and fat stranding suggesting acute pancreatitis. Small amount of fluid tracks along the retroperitoneum to the pelvis. Edema also tracks along the portal vein to the  ganga hepatis.     GALLBLADDER/BILIARY TREE: Post cholecystectomy. No biliary dilation.     STOMACH AND BOWEL: Unremarkable.     APPENDIX: Surgically absent.     ABDOMINOPELVIC CAVITY: No pneumoperitoneum. No lymphadenopathy.     VESSELS: Unremarkable for patient's age.     REPRODUCTIVE ORGANS: Status post supracervical hysterectomy.     ABDOMINAL WALL/INGUINAL REGIONS: Tiny fat-containing umbilical hernia.     BONES: No acute fracture or suspicious osseous lesion.     IMPRESSION:     Peripancreatic fluid/inflammation in keeping with acute pancreatitis. Cannot determine presence of parenchymal necrosis without IV contrast.     Small nonobstructing bilateral renal calculi.        Workstation performed: [id number]   [FreeTextEntry1] : 28 f diagnosed with myasthenia gravis about a year ago now on prednisone 40 daily was sent from neuro for ID w/u and vaccinations before starting soliris (Eculizumab) \par pt was born in [place] and had BCG vaccination, no TB exposure, had routine vaccinations

## 2025-01-14 NOTE — PLAN OF CARE
Problem: PAIN - ADULT  Goal: Verbalizes/displays adequate comfort level or baseline comfort level  Description: Interventions:  - Encourage patient to monitor pain and request assistance  - Assess pain using appropriate pain scale  - Administer analgesics based on type and severity of pain and evaluate response  - Implement non-pharmacological measures as appropriate and evaluate response  - Consider cultural and social influences on pain and pain management  - Notify physician/advanced practitioner if interventions unsuccessful or patient reports new pain  Outcome: Progressing     Problem: INFECTION - ADULT  Goal: Absence of fever/infection during neutropenic period  Description: INTERVENTIONS:  - Monitor WBC    Outcome: Progressing     Problem: DISCHARGE PLANNING  Goal: Discharge to home or other facility with appropriate resources  Description: INTERVENTIONS:  - Identify barriers to discharge w/patient and caregiver  - Arrange for needed discharge resources and transportation as appropriate  - Identify discharge learning needs (meds, wound care, etc.)  - Arrange for interpretive services to assist at discharge as needed  - Refer to Case Management Department for coordinating discharge planning if the patient needs post-hospital services based on physician/advanced practitioner order or complex needs related to functional status, cognitive ability, or social support system  Outcome: Progressing

## 2025-01-14 NOTE — ASSESSMENT & PLAN NOTE
Latest Reference Range & Units 01/13/25 20:11   Cholesterol See Comment mg/dL 263 (H)   Triglycerides See Comment mg/dL 182 (H)   HDL >=50 mg/dL 39 (L)   LDL Calculated 0 - 100 mg/dL 188 (H)   (H): Data is abnormally high  (L): Data is abnormally low    Started on Lipitor 40 mg daily

## 2025-01-14 NOTE — ASSESSMENT & PLAN NOTE
CT: mild peripancreatic edema and fat stranding to suggest acute pancreatitis.  Small amount of fluid tracks along the retroperitoneum to pelvis.  Edema tracks along the portal vein to the ganga hepatis.  Post cholecystectomy without biliary dilation.   Was recently started on azithromycin  Lipase normal   Triglycerides 182   IV fluid  Pain control

## 2025-01-15 LAB
ATRIAL RATE: 79 BPM
P AXIS: 48 DEGREES
PR INTERVAL: 160 MS
QRS AXIS: 7 DEGREES
QRSD INTERVAL: 100 MS
QT INTERVAL: 396 MS
QTC INTERVAL: 454 MS
T WAVE AXIS: 22 DEGREES
VENTRICULAR RATE: 79 BPM

## 2025-01-15 PROCEDURE — 93010 ELECTROCARDIOGRAM REPORT: CPT | Performed by: INTERNAL MEDICINE

## 2025-01-16 ENCOUNTER — OFFICE VISIT (OUTPATIENT)
Dept: GASTROENTEROLOGY | Facility: CLINIC | Age: 45
End: 2025-01-16
Payer: COMMERCIAL

## 2025-01-16 VITALS
WEIGHT: 168.6 LBS | BODY MASS INDEX: 28.06 KG/M2 | TEMPERATURE: 98.3 F | SYSTOLIC BLOOD PRESSURE: 126 MMHG | DIASTOLIC BLOOD PRESSURE: 72 MMHG

## 2025-01-16 DIAGNOSIS — K85.00 IDIOPATHIC ACUTE PANCREATITIS WITHOUT INFECTION OR NECROSIS: Primary | ICD-10-CM

## 2025-01-16 DIAGNOSIS — K85.90 ACUTE PANCREATITIS: ICD-10-CM

## 2025-01-16 PROCEDURE — 99203 OFFICE O/P NEW LOW 30 MIN: CPT | Performed by: PHYSICIAN ASSISTANT

## 2025-01-16 NOTE — PROGRESS NOTES
"Name: Malou Fink      : 1980      MRN: [id number]  Encounter Provider: Lidia Arreola PA-C  Encounter Date: 2025   Encounter department: [facility] [place]  :  Assessment & Plan  Idiopathic acute pancreatitis without infection or necrosis   Patient presented to the ER about 3 days ago with epigastric pain and was found to have pancreatitis on imaging however lipase was WNL. CBC and CMP WNL; lipid panel does show hyperlipidemia however triglycerides are only elevated to 182, likely not high enough to cause pancreatitis.  Calcium levels within normal limits.patient is already status post cholecystectomy as of .  Patient was started on azithromycin for a sinus infection, and is also on Lexapro.  -explained lipase can be normal in the setting of pancreatitis, shalonda if this was a delayed presentation   -Although not very common, studies do show that azithromycin can cause pancreatitis especially in a patient to the ready on medications that have the side effect (like Lexapro).  It is rare and unclear if her pancreatitis was drug-induced at this time.  -Low-fat diet with adequate hydration for at least the next 1 to 2 weeks    -Explained to patient that if she has a second episode of this without clear reason why then we would likely recommend genetic testing plus or minus MRI/MRCP or EUS           History of Present Illness   HPI  Maolu Fink is a 44 y.o. female who presents for hospital follow-up.  Patient says that she started with back pain that radiated to her epigastric region that would not alleviate and only got worse which prompted her to go to the ER.  She denies any alcohol use, tobacco use, drug use.  She denies any new supplements, protein supplements, weight loss supplements.  She says that the only new medication that she had taken prior to this starting was azithromycin for sinus infection and prednisone.  She says that her pain is \" a lot\" better " "today but she still has an ache in her epigastric region, however she does admit to not following a low-fat diet.  She otherwise denies changes in her bowel habits except for at times after she takes metformin.  She denies family history of pancreatic disease, family history of colon cancer, unintentional weight loss, fevers, chills, night sweats, heartburn, trouble swallowing or eating, nausea vomiting, bloody or black stools.  Patient has prior abdominal surgical history of appendectomy, hysterectomy, , and several laparoscopy.  History obtained from: patient    Review of Systems   Constitutional:  Negative for chills and fever.   HENT:  Negative for ear pain and sore throat.    Eyes:  Negative for pain and visual disturbance.   Respiratory:  Negative for cough and shortness of breath.    Cardiovascular:  Negative for chest pain and palpitations.   Gastrointestinal:  Positive for abdominal pain. Negative for abdominal distention, anal bleeding, blood in stool, constipation, diarrhea, nausea, rectal pain and vomiting.   Genitourinary:  Negative for dysuria and hematuria.   Musculoskeletal:  Negative for arthralgias and back pain.   Skin:  Negative for color change and rash.   Neurological:  Negative for seizures and syncope.   All other systems reviewed and are negative.    Medical History Reviewed by provider this encounter:     .  Past Medical History   Past Medical History:   Diagnosis Date    Anxiety     BRCA1 negative     BRCA2 negative     Chronic kidney disease     [id number]    Depression     Disease of thyroid gland     nodule    Endometriosis     Female infertility     iui with prior preg    Gastroduodenitis 2019    GERD (gastroesophageal reflux disease)     Gestational diabetes 2015    Kidney stone     PONV (postoperative nausea and vomiting)     gets \"very sick\"    Renal calculi     last assessed 10/20/14; US 10/2014 3mm and 4mm right, non obstructing    Varicella     childhood     Past " Surgical History:   Procedure Laterality Date    APPENDECTOMY       SECTION      CYSTOSCOPY N/A 3/1/2021    Procedure: CYSTOSCOPY;  Surgeon: Eliud Burch DO;  Location: [facility];  Service: Gynecology    DIAGNOSTIC LAPAROSCOPY      multiple, last 10/2012, 10/2014    LAPAROSCOPIC OVARIAN CYSTECTOMY      LASER LAPAROSCOPY      NASAL SEPTUM SURGERY      UT  DELIVERY ONLY N/A 2017    Procedure:  SECTION () REPEAT;  Surgeon: Shyla Rich DO;  [facility]: [facility];  Service: Obstetrics    UT LAPAROSCOPY SUPRACERVICAL HYSTERECTOMY 250 GM/< N/A 3/1/2021    Procedure: L.S.H., BILATERAL SALPINGECTOMY, RIGHT OOPHORECTOMY;  Surgeon: Eliud Burch DO;  Location: [facility];  Service: Gynecology    UT LAPAROSCOPY SURG CHOLECYSTECTOMY N/A 2023    Procedure: CHOLECYSTECTOMY LAPAROSCOPIC;  Surgeon: Braydon Heaton DO;  [facility];  Service: General    UT LIG/TRNSXJ FALOPIAN TUBE  DEL/ABDML SURG Bilateral 2017    Procedure: LIGATION/COAGULATION TUBAL;  Surgeon: Shyla Rich DO;  [facility];  Service: Obstetrics    UT MARSUPIALIZATION BARTHOLINS GLAND CYST Right 2024    Procedure: MARSUPILIZATION BARTHOLIN CYST, EUA;  Surgeon: Edwina Lynne MD;  [facility];  Service: Gynecology    TONSILLECTOMY      TUBAL LIGATION       Family History   Problem Relation Age of Onset    Depression Mother     Hypertension Mother     Miscarriages / Stillbirths Mother     Anxiety disorder Mother         NOS    Heart defect Mother         aortic valve disorder    Endometriosis Mother     Hyperlipidemia Father         high cholesterol    Breast cancer Sister 28    Cancer Sister         breast    Colon cancer Maternal Grandmother 68    Cancer Maternal Grandmother         colon    Depression Maternal Grandmother     Dementia Maternal Grandmother     Vision loss Maternal Grandmother     Arthritis Paternal Grandmother     Hearing loss  Paternal Grandmother     Heart disease Paternal Grandmother     Stroke Paternal Grandmother     Miscarriages / Stillbirths Paternal Grandmother     Colon cancer Paternal Grandmother 72    Alcohol abuse Maternal Uncle     Breast cancer Maternal Aunt 58    No Known Problems Maternal Aunt     No Known Problems Maternal Aunt     No Known Problems Paternal Aunt       reports that she has quit smoking. Her smoking use included cigarettes. She has never used smokeless tobacco. She reports that she does not drink alcohol and does not use drugs.  Current Outpatient Medications on File Prior to Visit   Medication Sig Dispense Refill    atorvastatin (LIPITOR) 40 mg tablet Take 1 tablet (40 mg total) by mouth daily 30 tablet 0    escitalopram (LEXAPRO) 10 mg tablet TAKE 1 TABLET BY MOUTH EVERY DAY 90 tablet 1    fexofenadine (ALLEGRA) 180 MG tablet Take 180 mg by mouth if needed      fluticasone (FLONASE) 50 mcg/act nasal spray 2 sprays into each nostril daily 1 Bottle 0    ketoconazole (NIZORAL) 2 % cream Apply topically daily 60 g 0    metFORMIN (GLUCOPHAGE-XR) 500 mg 24 hr tablet TAKE 1 TABLET ONCE A DAY WITH DINNER FOR 5 DAYS, THEN INCREASE DOSE TO 2 TABLETS ONCE A DAY FOR 5 DAYS, THEN INCREASE DOSE TO 3 TABLETS DAILY. 270 tablet 1    oxymetazoline (AFRIN) 0.05 % nasal spray 2 sprays by Each Nare route 2 (two) times a day 30 mL 0    pseudoephedrine (SUDAFED) 120 MG 12 hr tablet Take 120 mg by mouth 2 (two) times a day PRN      traZODone (DESYREL) 50 mg tablet TAKE 1 TABLET (50 MG TOTAL) BY MOUTH DAILY AT BEDTIME AS NEEDED FOR SLEEP 90 tablet 1     No current facility-administered medications on file prior to visit.     Allergies   Allergen Reactions    Cefprozil Shortness Of Breath    Pertussis Vaccine     Shellfish-Derived Products - Food Allergy Hives    Amoxicillin      Thrush,yeast infection      Current Outpatient Medications on File Prior to Visit   Medication Sig Dispense Refill    atorvastatin (LIPITOR) 40 mg tablet  Take 1 tablet (40 mg total) by mouth daily 30 tablet 0    escitalopram (LEXAPRO) 10 mg tablet TAKE 1 TABLET BY MOUTH EVERY DAY 90 tablet 1    fexofenadine (ALLEGRA) 180 MG tablet Take 180 mg by mouth if needed      fluticasone (FLONASE) 50 mcg/act nasal spray 2 sprays into each nostril daily 1 Bottle 0    ketoconazole (NIZORAL) 2 % cream Apply topically daily 60 g 0    metFORMIN (GLUCOPHAGE-XR) 500 mg 24 hr tablet TAKE 1 TABLET ONCE A DAY WITH DINNER FOR 5 DAYS, THEN INCREASE DOSE TO 2 TABLETS ONCE A DAY FOR 5 DAYS, THEN INCREASE DOSE TO 3 TABLETS DAILY. 270 tablet 1    oxymetazoline (AFRIN) 0.05 % nasal spray 2 sprays by Each Nare route 2 (two) times a day 30 mL 0    pseudoephedrine (SUDAFED) 120 MG 12 hr tablet Take 120 mg by mouth 2 (two) times a day PRN      traZODone (DESYREL) 50 mg tablet TAKE 1 TABLET (50 MG TOTAL) BY MOUTH DAILY AT BEDTIME AS NEEDED FOR SLEEP 90 tablet 1     No current facility-administered medications on file prior to visit.      Social History     Tobacco Use    Smoking status: Former     Types: Cigarettes    Smokeless tobacco: Never   Vaping Use    Vaping status: Never Used   Substance and Sexual Activity    Alcohol use: Never    Drug use: No    Sexual activity: Yes     Partners: Male     Birth control/protection: Female Sterilization        Objective   LMP 02/01/2019 (Exact Date)      Physical Exam  Vitals and nursing note reviewed.   Constitutional:       General: She is not in acute distress.     Appearance: She is well-developed.   HENT:      Head: Normocephalic and atraumatic.   Eyes:      Conjunctiva/sclera: Conjunctivae normal.   Cardiovascular:      Rate and Rhythm: Normal rate and regular rhythm.      Heart sounds: No murmur heard.  Pulmonary:      Effort: Pulmonary effort is normal. No respiratory distress.      Breath sounds: Normal breath sounds.   Abdominal:      Palpations: Abdomen is soft.      Tenderness: There is abdominal tenderness in the epigastric area. There is no  guarding or rebound.   Musculoskeletal:         General: No swelling.      Cervical back: Neck supple.   Skin:     General: Skin is warm and dry.      Capillary Refill: Capillary refill takes less than 2 seconds.   Neurological:      Mental Status: She is alert.   Psychiatric:         Mood and Affect: Mood normal.         Administrative Statements   I have spent a total time of 30 minutes in caring for this patient on the day of the visit/encounter including Diagnostic results, Prognosis, Risks and benefits of tx options, Instructions for management, Patient and family education, Importance of tx compliance, Risk factor reductions, Impressions, Counseling / Coordination of care, Documenting in the medical record, Reviewing / ordering tests, medicine, procedures  , Obtaining or reviewing history  , and Communicating with other healthcare professionals .

## 2025-01-16 NOTE — ASSESSMENT & PLAN NOTE
Patient presented to the ER about 3 days ago with epigastric pain and was found to have pancreatitis on imaging however lipase was WNL. CBC and CMP WNL; lipid panel does show hyperlipidemia however triglycerides are only elevated to 182, likely not high enough to cause pancreatitis.  Calcium levels within normal limits.patient is already status post cholecystectomy as of 2023.  Patient was started on azithromycin for a sinus infection, and is also on Lexapro.  -explained lipase can be normal in the setting of pancreatitis, shalonda if this was a delayed presentation   -Although not very common, studies do show that azithromycin can cause pancreatitis especially in a patient to the ready on medications that have the side effect (like Lexapro).  It is rare and unclear if her pancreatitis was drug-induced at this time.  -Low-fat diet with adequate hydration for at least the next 1 to 2 weeks    -Explained to patient that if she has a second episode of this without clear reason why then we would likely recommend genetic testing plus or minus MRI/MRCP or EUS

## 2025-01-23 ENCOUNTER — OFFICE VISIT (OUTPATIENT)
Dept: FAMILY MEDICINE CLINIC | Facility: CLINIC | Age: 45
End: 2025-01-23
Payer: COMMERCIAL

## 2025-01-23 VITALS
TEMPERATURE: 97.8 F | BODY MASS INDEX: 27.16 KG/M2 | DIASTOLIC BLOOD PRESSURE: 80 MMHG | WEIGHT: 163 LBS | OXYGEN SATURATION: 99 % | HEART RATE: 103 BPM | SYSTOLIC BLOOD PRESSURE: 124 MMHG | RESPIRATION RATE: 19 BRPM | HEIGHT: 65 IN

## 2025-01-23 DIAGNOSIS — F41.9 ANXIETY AND DEPRESSION: ICD-10-CM

## 2025-01-23 DIAGNOSIS — L98.9 SKIN LESION OF FACE: ICD-10-CM

## 2025-01-23 DIAGNOSIS — D72.829 LEUKOCYTOSIS, UNSPECIFIED TYPE: ICD-10-CM

## 2025-01-23 DIAGNOSIS — F32.A ANXIETY AND DEPRESSION: ICD-10-CM

## 2025-01-23 DIAGNOSIS — E78.5 DYSLIPIDEMIA: ICD-10-CM

## 2025-01-23 DIAGNOSIS — K85.90 ACUTE PANCREATITIS, UNSPECIFIED COMPLICATION STATUS, UNSPECIFIED PANCREATITIS TYPE: Primary | ICD-10-CM

## 2025-01-23 DIAGNOSIS — R73.02 IGT (IMPAIRED GLUCOSE TOLERANCE): ICD-10-CM

## 2025-01-23 PROCEDURE — 99495 TRANSJ CARE MGMT MOD F2F 14D: CPT | Performed by: FAMILY MEDICINE

## 2025-01-23 RX ORDER — ATORVASTATIN CALCIUM 20 MG/1
20 TABLET, FILM COATED ORAL DAILY
Qty: 30 TABLET | Refills: 3 | Status: SHIPPED | OUTPATIENT
Start: 2025-01-23

## 2025-01-23 NOTE — PROGRESS NOTES
Transition of Care Visit  Name: Malou Fink      : 1980      MRN: [id number]  Encounter Provider: Marixa Billings MD  Encounter Date: 2025   Encounter department: [facility]    Assessment & Plan  Acute pancreatitis, unspecified complication status, unspecified pancreatitis type  Episode of pancreatitis of unclear etiology  Hyperlipidemia, triglycerides are elevated 182, likely not the cause.  Normal lipase.  No alcohol use, no new Rx or supplement  Recent use of Zithromax for URI symptoms, doubt this is the cause of pancreatitis  History of cholecystectomy in , no indication of CBD dilatation based on CT results, I will discuss with patient's surgeonl  Patient reports significant improvement of her symptoms, follows bland diet.  She was evaluated by [facility] and will follow-up in 3 months.  GI suggests MRI MRCP if patient develops another episode of pancreatitis       Dyslipidemia  Started on Lipitor 40 mg daily by inpatient team.  We will reduce it to 20 mg daily.  Patient is motivated to change her diet to low-fat.  Reassess labs in 3 months Orders:  •  atorvastatin (LIPITOR) 20 mg tablet; Take 1 tablet (20 mg total) by mouth daily  •  Comprehensive metabolic panel; Future  •  CBC; Future  •  Lipid Panel with Direct LDL reflex; Future    Skin lesion of face   possible xanthelasma left upper eyelid.  Orders:  •  Ambulatory referral to Dermatology; Future    Leukocytosis, unspecified type  White blood cell count elevated 12,000 upon admission.  Improved upon discharge to 8,000.  Patient is afebrile.         Anxiety and depression  Continue Lexapro 10 mg daily symptoms are well-controlled       IGT (impaired glucose tolerance)  Continue metformin ER  Orders:  •  Hemoglobin A1C; Future          Patient Instructions   I will discuss results of recent CT with Dr. Heaton  and possibly GI  Dose of Lipitor should be 20 mg daily or 40 mg every other day  Please repeat blood  work in 3 months, 12-hour fasting  Low-fat diet, advance slowly as tolerated you  Dermatology  referral  placed    History of Present Illness     Transitional Care Management Review:   Malou Fink is a 44 y.o. female here for TCM follow up.     During the TCM phone call patient stated:  TCM Call     Date and time call was made  1/14/2025  5:53 PM    Hospital care reviewed  Records reviewed    Patient was hospitialized at  [facility]    Date of Admission  01/13/25    Date of discharge  01/14/25    Diagnosis  Pancreatitis    Disposition  Home    Were the patients medications reviewed and updated  No    Current Symptoms  None      TCM Call     Post hospital issues  None    Should patient be enrolled in anticoag monitoring?  No    Scheduled for follow up?  Yes    Patients specialists  Other (comment)    Other specialists names  Gastroenterology    Did you obtain your prescribed medications  Yes    Do you need help managing your prescriptions or medications  No    Is transportation to your appointment needed  No    I have advised the patient to call PCP with any new or worsening symptoms  Naheed Ojeda MA    Counseling topics  Activities of daily living; Importance of RX compliance    Comments  Called patient and schedule TCM with Dr. Billings on 01/23/2025 at 9:00 am        Patient presents for follow-up after recent hospitalization for acute pancreatitis.      Reportedly she developed pretty severe mid/lower back pain upon awakening on the day of hospitalization, pain was localized right underneath shoulder blades.  Patient stayed home, worked remotely, used heating pad and took Tylenol.  She did not experience abdominal pain, nausea vomiting or diarrhea at that time.  Subsequently she developed epigastric pain and was evaluated in the emergency room.  Patient was afebrile.  History of cholecystectomy in 2023.    Patient had URI symptoms few weeks preceding episode of cholecystitis.  She was evaluated  "urgent care center, with prednisone.  Subsequently she was seen in our office and was prescribed Z-Sohan.  Patient use Z-Sohan in the past with no adverse side effects    Patient is nondrinker.  No new supplements or medications.  No recent travel    Results of diagnostic testing performed during recent hospitalization reviewed.    CT renal Peripancreatic fluid/inflammation in keeping with acute pancreatitis. Cannot determine presence of parenchymal necrosis without IV contrast.Small nonobstructing bilateral renal calculi.    CT AP Stable findings compatible with acute pancreatitis. No evidence of parenchymal necrosis. Stable fluid in the mid abdomen without evidence of fluid collection.     Patient had GI f/up  last week, they recommend monitoring and follow-up in 3 months.  GI mention MRI MRCP if patient develops another episode of pancreatitis.      Patient follows very bland restricted low-fat diet.  Currently offers no complaints of abdominal pain, nausea vomiting dyspepsia or back pain.  No history of pancreatitis.  Inpatient blood work reviewed.  Patient's lipase was normal.  Unremarkable LFTs.  Mild elevation of white blood cell count that has normalized towards discharge.  Cholesterol elevated 263 with LDL of 188.                    Review of Systems   Constitutional: Negative.    HENT: Negative.     Eyes: Negative.    Respiratory: Negative.     Cardiovascular: Negative.    Gastrointestinal: Negative.    Endocrine: Negative.    Genitourinary: Negative.    Musculoskeletal: Negative.    Neurological: Negative.    Hematological: Negative.    Psychiatric/Behavioral: Negative.       Objective   /80 (BP Location: Left arm, Patient Position: Sitting, Cuff Size: Standard)   Pulse 103   Temp 97.8 °F (36.6 °C) (Temporal)   Resp 19   Ht 5' 5\" (1.651 m)   Wt 73.9 kg (163 lb)   LMP 02/01/2019 (Exact Date)   SpO2 99%   BMI 27.12 kg/m²     Physical Exam  Vitals and nursing note reviewed.   Constitutional:       " General: She is not in acute distress.     Appearance: Normal appearance. She is well-developed. She is not ill-appearing.   HENT:      Head: Normocephalic and atraumatic.   Eyes:      General: No scleral icterus.     Conjunctiva/sclera: Conjunctivae normal.   Neck:      Vascular: No carotid bruit.   Cardiovascular:      Rate and Rhythm: Normal rate and regular rhythm.      Heart sounds: Normal heart sounds. No murmur heard.  Pulmonary:      Effort: Pulmonary effort is normal. No respiratory distress.      Breath sounds: Normal breath sounds.   Abdominal:      General: Bowel sounds are normal. There is no distension or abdominal bruit.      Palpations: Abdomen is soft.      Tenderness: There is abdominal tenderness.      Comments: Mild lower abdominal and epigastric tenderness   Musculoskeletal:      Cervical back: Neck supple. No rigidity.      Right lower leg: No edema.      Left lower leg: No edema.   Skin:     General: Skin is warm.      Comments: Xanthelasma left upper eyelid    Neurological:      General: No focal deficit present.      Mental Status: She is alert and oriented to person, place, and time.   Psychiatric:         Mood and Affect: Mood normal.         Behavior: Behavior normal.       Medications have been reviewed by provider in current encounter

## 2025-01-23 NOTE — ASSESSMENT & PLAN NOTE
Started on Lipitor 40 mg daily by inpatient team.  We will reduce it to 20 mg daily.  Patient is motivated to change her diet to low-fat.  Reassess labs in 3 months Orders:  •  atorvastatin (LIPITOR) 20 mg tablet; Take 1 tablet (20 mg total) by mouth daily  •  Comprehensive metabolic panel; Future  •  CBC; Future  •  Lipid Panel with Direct LDL reflex; Future

## 2025-01-23 NOTE — PATIENT INSTRUCTIONS
I will discuss results of recent CT with Dr. Heaton  and possibly GI  Dose of Lipitor should be 20 mg daily or 40 mg every other day  Please repeat blood work in 3 months, 12-hour fasting  Low-fat diet, advance slowly as tolerated you  Dermatology  referral  placed

## 2025-01-27 NOTE — ASSESSMENT & PLAN NOTE
Episode of pancreatitis of unclear etiology  Hyperlipidemia, triglycerides are elevated 182, likely not the cause.  Normal lipase.  No alcohol use, no new Rx or supplement  Recent use of Zithromax for URI symptoms, doubt this is the cause of pancreatitis  History of cholecystectomy in 2023, no indication of CBD dilatation based on CT results, I will discuss with patient's surgeonl  Patient reports significant improvement of her symptoms, follows bland diet.  She was evaluated by [facility] and will follow-up in 3 months.  GI suggests MRI MRCP if patient develops another episode of pancreatitis

## 2025-01-27 NOTE — ASSESSMENT & PLAN NOTE
White blood cell count elevated 12,000 upon admission.  Improved upon discharge to 8,000.  Patient is afebrile.

## 2025-02-17 DIAGNOSIS — E78.5 DYSLIPIDEMIA: ICD-10-CM

## 2025-02-18 RX ORDER — ATORVASTATIN CALCIUM 20 MG/1
20 TABLET, FILM COATED ORAL DAILY
Qty: 90 TABLET | Refills: 1 | Status: SHIPPED | OUTPATIENT
Start: 2025-02-18

## 2025-03-19 DIAGNOSIS — F32.A ANXIETY AND DEPRESSION: ICD-10-CM

## 2025-03-19 DIAGNOSIS — F41.9 ANXIETY AND DEPRESSION: ICD-10-CM

## 2025-03-20 RX ORDER — ESCITALOPRAM OXALATE 10 MG/1
10 TABLET ORAL DAILY
Qty: 30 TABLET | Refills: 5 | Status: SHIPPED | OUTPATIENT
Start: 2025-03-20

## 2025-04-04 ENCOUNTER — OFFICE VISIT (OUTPATIENT)
Dept: URGENT CARE | Facility: CLINIC | Age: 45
End: 2025-04-04
Payer: COMMERCIAL

## 2025-04-04 ENCOUNTER — APPOINTMENT (OUTPATIENT)
Dept: RADIOLOGY | Facility: CLINIC | Age: 45
End: 2025-04-04
Payer: COMMERCIAL

## 2025-04-04 VITALS
WEIGHT: 160.6 LBS | RESPIRATION RATE: 18 BRPM | OXYGEN SATURATION: 99 % | BODY MASS INDEX: 26.73 KG/M2 | DIASTOLIC BLOOD PRESSURE: 78 MMHG | HEART RATE: 92 BPM | TEMPERATURE: 97.6 F | SYSTOLIC BLOOD PRESSURE: 122 MMHG

## 2025-04-04 DIAGNOSIS — S69.91XA INJURY OF RIGHT HAND, INITIAL ENCOUNTER: Primary | ICD-10-CM

## 2025-04-04 DIAGNOSIS — S69.91XA INJURY OF RIGHT HAND, INITIAL ENCOUNTER: ICD-10-CM

## 2025-04-04 PROCEDURE — 73130 X-RAY EXAM OF HAND: CPT

## 2025-04-04 PROCEDURE — G0383 LEV 4 HOSP TYPE B ED VISIT: HCPCS | Performed by: NURSE PRACTITIONER

## 2025-04-04 PROCEDURE — S9083 URGENT CARE CENTER GLOBAL: HCPCS | Performed by: NURSE PRACTITIONER

## 2025-04-04 NOTE — PROGRESS NOTES
[facility]        NAME: Malou Fink is a 44 y.o. female  : 1980    MRN: [id number]  DATE: 2025  TIME: 8:51 AM    Assessment and Plan   Injury of right hand, initial encounter [S69.91XA]  1. Injury of right hand, initial encounter  XR hand 3+ vw right    Splint            Patient Instructions     No fracture on wet read  Motrin OTC prn for pain  Splint applied to finger/hand at the clinic   Follow up with PCP in 3-5 days.  Proceed to  ER if symptoms worsen.    If tests have been performed at [facility], our office will contact you with results if changes need to be made to the care plan discussed with you at the visit.  You can review your full results on [facility].    Chief Complaint     Chief Complaint   Patient presents with    Hand Pain     Started last  when riding on back of [employer] cart and hit a bump, hit right hand on side rail, reports persistent pain and swelling to right pinky         History of Present Illness       HPI  Reports she was riding on a [employer] cart, hit a bump and accidentally hit the right hand on a frame on the cart. This was 5 days ago. Has been in pain since. Does not seem to be getting better.     Review of Systems   Review of Systems   Musculoskeletal:  Positive for arthralgias (right hand) and joint swelling.   Skin:  Negative for color change and wound.   Neurological:  Negative for numbness.         Current Medications       Current Outpatient Medications:     atorvastatin (LIPITOR) 20 mg tablet, TAKE 1 TABLET BY MOUTH EVERY DAY, Disp: 90 tablet, Rfl: 1    Cetirizine HCl (ALL DAY ALLERGY PO), , Disp: , Rfl:     escitalopram (LEXAPRO) 10 mg tablet, TAKE 1 TABLET BY MOUTH EVERY DAY, Disp: 30 tablet, Rfl: 5    fexofenadine (ALLEGRA) 180 MG tablet, Take 180 mg by mouth if needed, Disp: , Rfl:     fluticasone (FLONASE) 50 mcg/act nasal spray, 2 sprays into each nostril daily, Disp: 1 Bottle, Rfl: 0    ketoconazole (NIZORAL) 2 % cream, Apply topically  "daily, Disp: 60 g, Rfl: 0    metFORMIN (GLUCOPHAGE-XR) 500 mg 24 hr tablet, TAKE 1 TABLET ONCE A DAY WITH DINNER FOR 5 DAYS, THEN INCREASE DOSE TO 2 TABLETS ONCE A DAY FOR 5 DAYS, THEN INCREASE DOSE TO 3 TABLETS DAILY., Disp: 270 tablet, Rfl: 1    oxymetazoline (AFRIN) 0.05 % nasal spray, 2 sprays by Each Nare route 2 (two) times a day, Disp: 30 mL, Rfl: 0    pseudoephedrine (SUDAFED) 120 MG 12 hr tablet, Take 120 mg by mouth 2 (two) times a day PRN, Disp: , Rfl:     traZODone (DESYREL) 50 mg tablet, TAKE 1 TABLET (50 MG TOTAL) BY MOUTH DAILY AT BEDTIME AS NEEDED FOR SLEEP, Disp: 90 tablet, Rfl: 1    Current Allergies     Allergies as of 2025 - Reviewed 2025   Allergen Reaction Noted    Cefprozil Shortness Of Breath 05/15/2017    Pertussis vaccine      Shellfish-derived products - food allergy Hives 2017    Amoxicillin  2017    Azithromycin Other (See Comments) 2025            The following portions of the patient's history were reviewed and updated as appropriate: allergies, current medications, past family history, past medical history, past social history, past surgical history and problem list.     Past Medical History:   Diagnosis Date    Anxiety     BRCA1 negative     BRCA2 negative     Chronic kidney disease     [id number]    Depression     Disease of thyroid gland     nodule    Endometriosis     Female infertility     iui with prior preg    Gastroduodenitis 2019    GERD (gastroesophageal reflux disease)     Gestational diabetes 2015    Kidney stone     PONV (postoperative nausea and vomiting)     gets \"very sick\"    Renal calculi     last assessed 10/20/14; US 10/2014 3mm and 4mm right, non obstructing    Varicella     childhood       Past Surgical History:   Procedure Laterality Date    APPENDECTOMY       SECTION      CYSTOSCOPY N/A 2021    Procedure: CYSTOSCOPY;  Surgeon: Eliud Burch DO;  Location: [facility];  Service: Gynecology    DIAGNOSTIC " LAPAROSCOPY      multiple, last 10/2012, 10/2014    LAPAROSCOPIC OVARIAN CYSTECTOMY      LASER LAPAROSCOPY      NASAL SEPTUM SURGERY      NH  DELIVERY ONLY N/A 2017    Procedure:  SECTION () REPEAT;  Surgeon: Shyla Rich DO;  [facility]: [facility];  Service: Obstetrics    NH LAPAROSCOPY SUPRACERVICAL HYSTERECTOMY 250 GM/< N/A 2021    Procedure: L.S.H., BILATERAL SALPINGECTOMY, RIGHT OOPHORECTOMY;  Surgeon: Eliud Burch DO;  Location: [facility];  Service: Gynecology    NH LAPAROSCOPY SURG CHOLECYSTECTOMY N/A 2023    Procedure: CHOLECYSTECTOMY LAPAROSCOPIC;  Surgeon: Braydon Heaton DO;  [facility];  Service: General    NH LIG/TRNSXJ FALOPIAN TUBE  DEL/ABDML SURG Bilateral 2017    Procedure: LIGATION/COAGULATION TUBAL;  Surgeon: Shyla Rich DO;  [facility]: [facility];  Service: Obstetrics    NH MARSUPIALIZATION BARTHOLINS GLAND CYST Right 2024    Procedure: MARSUPILIZATION BARTHOLIN CYST, EUA;  Surgeon: Edwina Lynne MD;  [facility];  Service: Gynecology    TONSILLECTOMY      TUBAL LIGATION         Family History   Problem Relation Age of Onset    Depression Mother     Hypertension Mother     Miscarriages / Stillbirths Mother     Anxiety disorder Mother         NOS    Heart defect Mother         aortic valve disorder    Endometriosis Mother     Hyperlipidemia Father         high cholesterol    Breast cancer Sister 28    Cancer Sister         breast    Colon cancer Maternal Grandmother 68    Cancer Maternal Grandmother         colon    Depression Maternal Grandmother     Dementia Maternal Grandmother     Vision loss Maternal Grandmother     Arthritis Paternal Grandmother     Hearing loss Paternal Grandmother     Heart disease Paternal Grandmother     Stroke Paternal Grandmother     Miscarriages / Stillbirths Paternal Grandmother     Colon cancer Paternal Grandmother 72    Alcohol abuse Maternal Uncle     Breast  cancer Maternal Aunt 58    No Known Problems Maternal Aunt     No Known Problems Maternal Aunt     No Known Problems Paternal Aunt          Medications have been verified.        Objective   /78 (BP Location: Right arm, Patient Position: Sitting)   Pulse 92   Temp 97.6 °F (36.4 °C) (Tympanic)   Resp 18   Wt 72.8 kg (160 lb 9.6 oz)   LMP 02/01/2019 (Exact Date)   SpO2 99%   BMI 26.73 kg/m²   Patient's last menstrual period was 02/01/2019 (exact date).       Physical Exam     Physical Exam  Musculoskeletal:         General: Swelling (mild swelling along the 5th metacarpal joint of the right hand) and tenderness (with palpation of the affected area. pain also with active flexion of the right pinky finger, along the 5th MCP joint) present. No deformity.   Skin:     Capillary Refill: Capillary refill takes less than 2 seconds.      Findings: No bruising.

## 2025-04-13 DIAGNOSIS — F32.A ANXIETY AND DEPRESSION: ICD-10-CM

## 2025-04-13 DIAGNOSIS — F41.9 ANXIETY AND DEPRESSION: ICD-10-CM

## 2025-04-14 RX ORDER — ESCITALOPRAM OXALATE 10 MG/1
10 TABLET ORAL DAILY
Qty: 90 TABLET | Refills: 1 | Status: SHIPPED | OUTPATIENT
Start: 2025-04-14

## 2025-04-17 ENCOUNTER — APPOINTMENT (OUTPATIENT)
Dept: LAB | Facility: CLINIC | Age: 45
End: 2025-04-17
Payer: COMMERCIAL

## 2025-04-17 DIAGNOSIS — F51.01 PRIMARY INSOMNIA: ICD-10-CM

## 2025-04-17 DIAGNOSIS — R73.02 IGT (IMPAIRED GLUCOSE TOLERANCE): ICD-10-CM

## 2025-04-17 DIAGNOSIS — E78.5 DYSLIPIDEMIA: ICD-10-CM

## 2025-04-17 LAB
ALBUMIN SERPL BCG-MCNC: 4.3 G/DL (ref 3.5–5)
ALP SERPL-CCNC: 102 U/L (ref 34–104)
ALT SERPL W P-5'-P-CCNC: 15 U/L (ref 7–52)
ANION GAP SERPL CALCULATED.3IONS-SCNC: 8 MMOL/L (ref 4–13)
AST SERPL W P-5'-P-CCNC: 15 U/L (ref 13–39)
BILIRUB SERPL-MCNC: 0.7 MG/DL (ref 0.2–1)
BUN SERPL-MCNC: 6 MG/DL (ref 5–25)
CALCIUM SERPL-MCNC: 9.3 MG/DL (ref 8.4–10.2)
CHLORIDE SERPL-SCNC: 104 MMOL/L (ref 96–108)
CHOLEST SERPL-MCNC: 169 MG/DL (ref ?–200)
CO2 SERPL-SCNC: 28 MMOL/L (ref 21–32)
CREAT SERPL-MCNC: 0.62 MG/DL (ref 0.6–1.3)
ERYTHROCYTE [DISTWIDTH] IN BLOOD BY AUTOMATED COUNT: 12.7 % (ref 11.6–15.1)
EST. AVERAGE GLUCOSE BLD GHB EST-MCNC: 111 MG/DL
GFR SERPL CREATININE-BSD FRML MDRD: 111 ML/MIN/1.73SQ M
GLUCOSE P FAST SERPL-MCNC: 85 MG/DL (ref 65–99)
HBA1C MFR BLD: 5.5 %
HCT VFR BLD AUTO: 42 % (ref 34.8–46.1)
HDLC SERPL-MCNC: 37 MG/DL
HGB BLD-MCNC: 13.5 G/DL (ref 11.5–15.4)
LDLC SERPL CALC-MCNC: 99 MG/DL (ref 0–100)
MCH RBC QN AUTO: 27.8 PG (ref 26.8–34.3)
MCHC RBC AUTO-ENTMCNC: 32.1 G/DL (ref 31.4–37.4)
MCV RBC AUTO: 87 FL (ref 82–98)
PLATELET # BLD AUTO: 257 THOUSANDS/UL (ref 149–390)
PMV BLD AUTO: 11.1 FL (ref 8.9–12.7)
POTASSIUM SERPL-SCNC: 3.8 MMOL/L (ref 3.5–5.3)
PROT SERPL-MCNC: 6.6 G/DL (ref 6.4–8.4)
RBC # BLD AUTO: 4.85 MILLION/UL (ref 3.81–5.12)
SODIUM SERPL-SCNC: 140 MMOL/L (ref 135–147)
TRIGL SERPL-MCNC: 164 MG/DL (ref ?–150)
WBC # BLD AUTO: 8.16 THOUSAND/UL (ref 4.31–10.16)

## 2025-04-17 PROCEDURE — 36415 COLL VENOUS BLD VENIPUNCTURE: CPT

## 2025-04-17 PROCEDURE — 80061 LIPID PANEL: CPT

## 2025-04-17 PROCEDURE — 83036 HEMOGLOBIN GLYCOSYLATED A1C: CPT

## 2025-04-17 PROCEDURE — 80053 COMPREHEN METABOLIC PANEL: CPT

## 2025-04-17 PROCEDURE — 85027 COMPLETE CBC AUTOMATED: CPT

## 2025-04-18 ENCOUNTER — RESULTS FOLLOW-UP (OUTPATIENT)
Dept: FAMILY MEDICINE CLINIC | Facility: CLINIC | Age: 45
End: 2025-04-18

## 2025-04-18 DIAGNOSIS — E78.5 DYSLIPIDEMIA: Primary | ICD-10-CM

## 2025-04-18 RX ORDER — TRAZODONE HYDROCHLORIDE 50 MG/1
50 TABLET ORAL
Qty: 30 TABLET | Refills: 5 | Status: SHIPPED | OUTPATIENT
Start: 2025-04-18

## 2025-04-18 RX ORDER — METFORMIN HYDROCHLORIDE 500 MG/1
TABLET, EXTENDED RELEASE ORAL
Qty: 90 TABLET | Refills: 5 | Status: SHIPPED | OUTPATIENT
Start: 2025-04-18

## 2025-05-06 ENCOUNTER — OFFICE VISIT (OUTPATIENT)
Dept: GASTROENTEROLOGY | Facility: CLINIC | Age: 45
End: 2025-05-06
Payer: COMMERCIAL

## 2025-05-06 VITALS
WEIGHT: 164 LBS | HEART RATE: 96 BPM | DIASTOLIC BLOOD PRESSURE: 72 MMHG | SYSTOLIC BLOOD PRESSURE: 110 MMHG | BODY MASS INDEX: 27.32 KG/M2 | HEIGHT: 65 IN

## 2025-05-06 DIAGNOSIS — R12 HEARTBURN: ICD-10-CM

## 2025-05-06 DIAGNOSIS — K85.00 IDIOPATHIC ACUTE PANCREATITIS WITHOUT INFECTION OR NECROSIS: Primary | ICD-10-CM

## 2025-05-06 PROCEDURE — 99213 OFFICE O/P EST LOW 20 MIN: CPT | Performed by: PHYSICIAN ASSISTANT

## 2025-05-06 RX ORDER — FAMOTIDINE 40 MG/1
40 TABLET, FILM COATED ORAL DAILY
Qty: 30 TABLET | Refills: 2 | Status: SHIPPED | OUTPATIENT
Start: 2025-05-06

## 2025-05-06 NOTE — ASSESSMENT & PLAN NOTE
Thankfully, the patient says she has not had similar pain that she had when she went to the ER for pancreatitis.  She says she has been feeling well for the most part.

## 2025-05-06 NOTE — PROGRESS NOTES
Name: Malou Fink      : 1980      MRN: [id number]  Encounter Provider: Lidia Arreola PA-C  Encounter Date: 2025   Encounter department: [facility] [place]  :  Assessment & Plan  Idiopathic acute pancreatitis without infection or necrosis  Thankfully, the patient says she has not had similar pain that she had when she went to the ER for pancreatitis.  She says she has been feeling well for the most part.       Heartburn  The patient says that every once in a while, she will get heartburn and nausea but this is not occurring on a weekly basis.  She says that she is unsure if this is related to her heartburn but there are also times when she eats something fatty/greasy that she feels some irritation in her epigastric region but this usually resolves very quickly. She does admit to not eating in the morning and usually drinking Coke on an empty stomach in the mornings.  -I explained that the pain that she gets intermittently is likely due to the food she is eating, as is the heartburn/nausea  -Discussed anti-GERD diet in detail and lifestyle management  -Start Pepcid 40 mg as needed for heartburn  Orders:    famotidine (PEPCID) 40 MG tablet; Take 1 tablet (40 mg total) by mouth daily As needed for pain/heartburn        History of Present Illness   HPI  Malou Fink is a 44 y.o. female who presents for follow-up. Thankfully, the patient says she has not had similar pain that she had when she went to the ER for pancreatitis.  She says she has been feeling well for the most part.The patient says that every once in a while, she will get heartburn and nausea but this is not occurring on a weekly basis.  She says that she is unsure if this is related to her heartburn but there are also times when she eats something fatty/greasy that she feels some irritation in her epigastric region but this usually resolves very quickly.  She does admit to not eating in the morning and  usually drinking Coke on an empty stomach in the mornings.  She otherwise denies vomiting, trouble swallowing or eating, changes in bowel habits, bloody or black bowel movements.      Review of Systems   Constitutional:  Negative for chills, fever and unexpected weight change.   HENT:  Negative for trouble swallowing.    Gastrointestinal:  Negative for abdominal distention, abdominal pain, anal bleeding, blood in stool, constipation, diarrhea, nausea, rectal pain and vomiting.   Skin:  Negative for color change and rash.   All other systems reviewed and are negative.         Objective   LMP 02/01/2019 (Exact Date)      Physical Exam  Constitutional:       Appearance: Normal appearance.   Abdominal:      General: Bowel sounds are normal. There is no distension.      Palpations: There is no mass.      Tenderness: There is no abdominal tenderness. There is no guarding or rebound.   Neurological:      Mental Status: She is alert.

## 2025-05-13 ENCOUNTER — OFFICE VISIT (OUTPATIENT)
Dept: FAMILY MEDICINE CLINIC | Facility: CLINIC | Age: 45
End: 2025-05-13
Payer: COMMERCIAL

## 2025-05-13 VITALS
SYSTOLIC BLOOD PRESSURE: 124 MMHG | RESPIRATION RATE: 18 BRPM | OXYGEN SATURATION: 99 % | BODY MASS INDEX: 27.26 KG/M2 | HEIGHT: 65 IN | TEMPERATURE: 97.6 F | WEIGHT: 163.6 LBS | HEART RATE: 86 BPM | DIASTOLIC BLOOD PRESSURE: 82 MMHG

## 2025-05-13 DIAGNOSIS — H66.90 ACUTE OTITIS MEDIA, UNSPECIFIED OTITIS MEDIA TYPE: ICD-10-CM

## 2025-05-13 DIAGNOSIS — J32.9 SINUSITIS, UNSPECIFIED CHRONICITY, UNSPECIFIED LOCATION: Primary | ICD-10-CM

## 2025-05-13 PROCEDURE — 99213 OFFICE O/P EST LOW 20 MIN: CPT | Performed by: NURSE PRACTITIONER

## 2025-05-13 RX ORDER — PREDNISONE 10 MG/1
TABLET ORAL
Qty: 20 TABLET | Refills: 0 | Status: SHIPPED | OUTPATIENT
Start: 2025-05-13

## 2025-05-13 RX ORDER — DOXYCYCLINE HYCLATE 100 MG
100 TABLET ORAL 2 TIMES DAILY
Qty: 20 TABLET | Refills: 0 | Status: SHIPPED | OUTPATIENT
Start: 2025-05-13 | End: 2025-05-23

## 2025-05-13 NOTE — PROGRESS NOTES
"Name: Malou Fink      : 1980      MRN: [id number]  Encounter Provider: AQUILES Xiong  Encounter Date: 2025   Encounter department: [facility]  :  Assessment & Plan  Sinusitis, unspecified chronicity, unspecified location  Start doxycycline and prednisone taper.   Call if not improving over next 3-4 days.     Orders:  •  doxycycline hyclate (VIBRA-TABS) 100 mg tablet; Take 1 tablet (100 mg total) by mouth 2 (two) times a day for 10 days  •  predniSONE 10 mg tablet; Take 4 tablets for 2 days, 3 tablets for 2 days, 2 tablets for 2 days, 1 tablet for 2 days.    Acute otitis media, unspecified otitis media type  Left ear  Start doxycycline and prednisone taper.   Allergies/intolerance to cephalosporins, azithromycin, amoxicillin.   Call if not improving over next 3-4 days.     Orders:  •  doxycycline hyclate (VIBRA-TABS) 100 mg tablet; Take 1 tablet (100 mg total) by mouth 2 (two) times a day for 10 days           History of Present Illness   Malou Fink is a 44 year old female presenting today for sinus symptoms and left ear pain.     Sinus symptoms started 10 days ago.   Traveled via air, not any better, worse actually.  Ear pain left and can't hear on the left.     Trying Mulitple OTC medications.   Cold medications, afrin,     Congestion, sore throat, headaches.  Cough mild, related to post nasal drip.      Review of Systems   Constitutional:  Positive for fatigue. Negative for fever.   HENT:  Positive for congestion, ear pain, postnasal drip, sinus pressure and sore throat.    Respiratory:  Positive for cough.    Cardiovascular: Negative.    Gastrointestinal: Negative.    Neurological:  Positive for headaches.       Objective   /82 (BP Location: Left arm, Patient Position: Sitting, Cuff Size: Standard)   Pulse 86   Temp 97.6 °F (36.4 °C) (Temporal)   Resp 18   Ht 5' 5\" (1.651 m)   Wt 74.2 kg (163 lb 9.6 oz)   LMP 2019 (Exact Date)   SpO2 99%   BMI 27.22 " kg/m²      Physical Exam  Vitals and nursing note reviewed.   Constitutional:       General: She is not in acute distress.     Appearance: Normal appearance. She is not ill-appearing.   HENT:      Head: Atraumatic.      Right Ear: Tympanic membrane normal.      Left Ear: Tympanic membrane is erythematous.      Mouth/Throat:      Mouth: Mucous membranes are moist.      Pharynx: Oropharynx is clear.   Cardiovascular:      Rate and Rhythm: Normal rate and regular rhythm.      Heart sounds: No murmur heard.  Pulmonary:      Effort: Pulmonary effort is normal.      Breath sounds: Normal breath sounds.   Musculoskeletal:      Cervical back: Normal range of motion and neck supple.   Lymphadenopathy:      Cervical: No cervical adenopathy.   Neurological:      Mental Status: She is alert.

## 2025-06-04 DIAGNOSIS — R12 HEARTBURN: ICD-10-CM

## 2025-06-04 RX ORDER — FAMOTIDINE 40 MG/1
TABLET, FILM COATED ORAL
Qty: 90 TABLET | Refills: 1 | Status: SHIPPED | OUTPATIENT
Start: 2025-06-04

## 2025-06-10 ENCOUNTER — OFFICE VISIT (OUTPATIENT)
Dept: FAMILY MEDICINE CLINIC | Facility: CLINIC | Age: 45
End: 2025-06-10
Payer: COMMERCIAL

## 2025-06-10 VITALS
BODY MASS INDEX: 26.82 KG/M2 | HEART RATE: 96 BPM | SYSTOLIC BLOOD PRESSURE: 120 MMHG | HEIGHT: 65 IN | DIASTOLIC BLOOD PRESSURE: 80 MMHG | OXYGEN SATURATION: 97 % | RESPIRATION RATE: 16 BRPM | TEMPERATURE: 97.5 F | WEIGHT: 161 LBS

## 2025-06-10 DIAGNOSIS — B37.31 VAGINAL CANDIDIASIS: ICD-10-CM

## 2025-06-10 DIAGNOSIS — R39.9 UTI SYMPTOMS: Primary | ICD-10-CM

## 2025-06-10 DIAGNOSIS — J32.9 SINUSITIS, UNSPECIFIED CHRONICITY, UNSPECIFIED LOCATION: ICD-10-CM

## 2025-06-10 LAB
SL AMB  POCT GLUCOSE, UA: NORMAL
SL AMB LEUKOCYTE ESTERASE,UA: NORMAL
SL AMB POCT BILIRUBIN,UA: NORMAL
SL AMB POCT BLOOD,UA: NORMAL
SL AMB POCT CLARITY,UA: CLEAR
SL AMB POCT COLOR,UA: YELLOW
SL AMB POCT KETONES,UA: NORMAL
SL AMB POCT NITRITE,UA: NORMAL
SL AMB POCT PH,UA: 6
SL AMB POCT SPECIFIC GRAVITY,UA: 1.02
SL AMB POCT URINE PROTEIN: NORMAL
SL AMB POCT UROBILINOGEN: NORMAL

## 2025-06-10 PROCEDURE — 99214 OFFICE O/P EST MOD 30 MIN: CPT | Performed by: NURSE PRACTITIONER

## 2025-06-10 PROCEDURE — 87086 URINE CULTURE/COLONY COUNT: CPT | Performed by: NURSE PRACTITIONER

## 2025-06-10 PROCEDURE — 81003 URINALYSIS AUTO W/O SCOPE: CPT | Performed by: NURSE PRACTITIONER

## 2025-06-10 RX ORDER — FLUCONAZOLE 150 MG/1
150 TABLET ORAL ONCE
Qty: 2 TABLET | Refills: 0 | Status: SHIPPED | OUTPATIENT
Start: 2025-06-10 | End: 2025-06-10

## 2025-06-10 NOTE — PROGRESS NOTES
Name: Malou Fink      : 1980      MRN: [id number]  Encounter Provider: AQUILES Xiong  Encounter Date: 6/10/2025   Encounter department: [facility]  :  Assessment & Plan  UTI symptoms  Urine dip positive for +2 blood, otherwise clear.   Based on symptoms and co-occurring sinusitis will treat with Augmentin.  She will call for any persisting or worsening of symptoms.   Office will call her when urine culture results are available.      Orders:  •  POCT urine dip auto non-scope  •  Urine culture  •  amoxicillin-clavulanate (AUGMENTIN) 875-125 mg per tablet; Take 1 tablet by mouth every 12 (twelve) hours for 7 days    Sinusitis, unspecified chronicity, unspecified location  Start Augmentin, take with food.   Call if symptoms are not improving over the next 3-4 days.    Orders:  •  amoxicillin-clavulanate (AUGMENTIN) 875-125 mg per tablet; Take 1 tablet by mouth every 12 (twelve) hours for 7 days    Vaginal candidiasis  Tends to get oral thrush or vaginal candidiasis with Augmentin.   Will treat with Diflcuan 1 tablet in 3 days with repeat dose on last day of Augmentin course.   Call for any thrush or vaginal candidiasis symptoms.     Orders:  •  fluconazole (DIFLUCAN) 150 mg tablet; Take 1 tablet (150 mg total) by mouth once for 1 dose Repeat dose in 3 days           History of Present Illness   Malou Fink     Seen in May treated for sinusitis with doxycycline and prednisone.     After this got flu like symptoms.   Has not gotten completely better.  Still with congestion, headaches, ear pain.       Urinary symptoms started Saturday.   Dysuria and frequency.   Lower back pain and mild pelvic pain.   No fevers, chills, sweats, nausea or vomiting.                   Review of Systems   HENT:  Positive for congestion, ear pain and postnasal drip.    Genitourinary:  Positive for dysuria, flank pain, frequency, pelvic pain and urgency. Negative for hematuria.   Neurological:  Positive for  "headaches.       Objective   /80   Pulse 96   Temp 97.5 °F (36.4 °C) (Temporal)   Resp 16   Ht 5' 5\" (1.651 m)   Wt 73 kg (161 lb)   LMP 02/01/2019 (Exact Date)   SpO2 97%   BMI 26.79 kg/m²      Physical Exam  Vitals and nursing note reviewed.   Constitutional:       General: She is not in acute distress.     Appearance: Normal appearance. She is not ill-appearing.   HENT:      Head: Atraumatic.      Right Ear: Tympanic membrane normal.      Left Ear: Tympanic membrane normal.      Nose: Congestion present.      Mouth/Throat:      Mouth: Mucous membranes are moist.      Pharynx: Oropharynx is clear.     Cardiovascular:      Rate and Rhythm: Normal rate and regular rhythm.      Heart sounds: No murmur heard.  Pulmonary:      Effort: Pulmonary effort is normal.      Breath sounds: Normal breath sounds.   Abdominal:      General: Abdomen is flat.      Palpations: Abdomen is soft.      Tenderness: There is abdominal tenderness (lower abdomen tenderness). There is no right CVA tenderness or left CVA tenderness.     Musculoskeletal:      Cervical back: Normal range of motion and neck supple.      Right lower leg: No edema.      Left lower leg: No edema.     Neurological:      Mental Status: She is alert.     Psychiatric:         Mood and Affect: Mood normal.       Current Medications[1]          [1]    Current Outpatient Medications:   •  amoxicillin-clavulanate (AUGMENTIN) 875-125 mg per tablet, Take 1 tablet by mouth every 12 (twelve) hours for 7 days, Disp: 14 tablet, Rfl: 0  •  atorvastatin (LIPITOR) 20 mg tablet, TAKE 1 TABLET BY MOUTH EVERY DAY, Disp: 90 tablet, Rfl: 1  •  Cetirizine HCl (ALL DAY ALLERGY PO), , Disp: , Rfl:   •  escitalopram (LEXAPRO) 10 mg tablet, TAKE 1 TABLET BY MOUTH EVERY DAY, Disp: 90 tablet, Rfl: 1  •  famotidine (PEPCID) 40 MG tablet, TAKE 1 TABLET BY MOUTH DAILY AS NEEDED FOR PAIN/HEARTBURN, Disp: 90 tablet, Rfl: 1  •  fluconazole (DIFLUCAN) 150 mg tablet, Take 1 tablet (150 " mg total) by mouth once for 1 dose Repeat dose in 3 days, Disp: 2 tablet, Rfl: 0  •  fluticasone (FLONASE) 50 mcg/act nasal spray, 2 sprays into each nostril daily, Disp: 1 Bottle, Rfl: 0  •  metFORMIN (GLUCOPHAGE-XR) 500 mg 24 hr tablet, TAKE 1 TABLET ONCE A DAY WITH DINNER FOR 5 DAYS, THEN INCREASE DOSE TO 2 TABLETS ONCE A DAY FOR 5 DAYS, THEN INCREASE DOSE TO 3 TABLETS DAILY., Disp: 90 tablet, Rfl: 5  •  traZODone (DESYREL) 50 mg tablet, TAKE 1 TABLET (50 MG TOTAL) BY MOUTH DAILY AT BEDTIME AS NEEDED FOR SLEEP, Disp: 30 tablet, Rfl: 5

## 2025-06-11 ENCOUNTER — RESULTS FOLLOW-UP (OUTPATIENT)
Dept: FAMILY MEDICINE CLINIC | Facility: CLINIC | Age: 45
End: 2025-06-11

## 2025-06-11 LAB — BACTERIA UR CULT: NORMAL

## 2025-07-28 ENCOUNTER — OFFICE VISIT (OUTPATIENT)
Dept: FAMILY MEDICINE CLINIC | Facility: CLINIC | Age: 45
End: 2025-07-28
Payer: COMMERCIAL

## 2025-07-28 VITALS
DIASTOLIC BLOOD PRESSURE: 80 MMHG | BODY MASS INDEX: 26.16 KG/M2 | TEMPERATURE: 99.2 F | HEART RATE: 107 BPM | HEIGHT: 65 IN | WEIGHT: 157 LBS | OXYGEN SATURATION: 95 % | RESPIRATION RATE: 16 BRPM | SYSTOLIC BLOOD PRESSURE: 120 MMHG

## 2025-07-28 DIAGNOSIS — J32.9 SINUSITIS, UNSPECIFIED CHRONICITY, UNSPECIFIED LOCATION: Primary | ICD-10-CM

## 2025-07-28 PROCEDURE — 99213 OFFICE O/P EST LOW 20 MIN: CPT | Performed by: NURSE PRACTITIONER

## 2025-07-28 RX ORDER — METHYLPREDNISOLONE 4 MG/1
TABLET ORAL
Qty: 21 EACH | Refills: 0 | Status: SHIPPED | OUTPATIENT
Start: 2025-07-28

## 2025-07-28 RX ORDER — DOXYCYCLINE HYCLATE 100 MG
100 TABLET ORAL 2 TIMES DAILY
Qty: 14 TABLET | Refills: 0 | Status: SHIPPED | OUTPATIENT
Start: 2025-07-28 | End: 2025-08-04

## (undated) DEVICE — ELECTRODE LAP J HOOK E-Z CLEAN 33CM-0021

## (undated) DEVICE — GLOVE INDICATOR PI UNDERGLOVE SZ 7 BLUE

## (undated) DEVICE — INTENDED FOR TISSUE SEPARATION, AND OTHER PROCEDURES THAT REQUIRE A SHARP SURGICAL BLADE TO PUNCTURE OR CUT.: Brand: BARD-PARKER SAFETY BLADES SIZE 11, STERILE

## (undated) DEVICE — PENCIL ELECTROSURG E-Z CLEAN -0035H

## (undated) DEVICE — CHLORHEXIDINE 4PCT 4 OZ

## (undated) DEVICE — NEEDLE 25G X 1 1/2

## (undated) DEVICE — GLOVE PI ULTRA TOUCH SZ.7.0

## (undated) DEVICE — GLOVE SRG BIOGEL ORTHOPEDIC 7

## (undated) DEVICE — IRRIG ENDO FLO TUBING

## (undated) DEVICE — SUT VICRYL 3-0 SH 27 IN J416H

## (undated) DEVICE — DRAPE EQUIPMENT RF WAND

## (undated) DEVICE — BETHLEHEM UNIVERSAL MINOR VAG: Brand: CARDINAL HEALTH

## (undated) DEVICE — [HIGH FLOW INSUFFLATOR,  DO NOT USE IF PACKAGE IS DAMAGED,  KEEP DRY,  KEEP AWAY FROM SUNLIGHT,  PROTECT FROM HEAT AND RADIOACTIVE SOURCES.]: Brand: PNEUMOSURE

## (undated) DEVICE — HARMONIC 1100 SHEARS, 36CM SHAFT LENGTH: Brand: HARMONIC

## (undated) DEVICE — SUT MONOCRYL 4-0 PS-2 18 IN Y496G

## (undated) DEVICE — GLOVE SRG BIOGEL ORTHOPEDIC 8

## (undated) DEVICE — ABDOMINAL PAD: Brand: DERMACEA

## (undated) DEVICE — DECANTER: Brand: UNBRANDED

## (undated) DEVICE — CHLORAPREP HI-LITE 26ML ORANGE

## (undated) DEVICE — PREMIUM DRY TRAY LF: Brand: MEDLINE INDUSTRIES, INC.

## (undated) DEVICE — TRAY FOLEY 16FR URIMETER SILICONE SURESTEP

## (undated) DEVICE — MAYO STAND COVER: Brand: CONVERTORS

## (undated) DEVICE — SYRINGE 10ML LL

## (undated) DEVICE — LIGASURE LAP SLR/DIV MARYLAND 5MM

## (undated) DEVICE — SUT VICRYL 0 UR-6 27 IN J603H

## (undated) DEVICE — NEEDLE 22 G X 1 1/2 SAFETY

## (undated) DEVICE — TUBING SUCTION 5MM X 12 FT

## (undated) DEVICE — SUT PLAIN 3-0 PS-1 27 IN 1640H

## (undated) DEVICE — 3M™ STERI-STRIP™ REINFORCED ADHESIVE SKIN CLOSURES, R1547, 1/2 IN X 4 IN (12 MM X 100 MM), 6 STRIPS/ENVELOPE: Brand: 3M™ STERI-STRIP™

## (undated) DEVICE — TROCAR: Brand: KII® SLEEVE

## (undated) DEVICE — UNDYED BRAIDED (POLYGLACTIN 910), SYNTHETIC ABSORBABLE SUTURE: Brand: COATED VICRYL

## (undated) DEVICE — PACK PBDS LAP CHOLE RF

## (undated) DEVICE — TROCARS: Brand: KII® OPTICAL ACCESS SYSTEM

## (undated) DEVICE — Device

## (undated) DEVICE — 3000CC GUARDIAN II: Brand: GUARDIAN

## (undated) DEVICE — TROCAR: Brand: KII FIOS FIRST ENTRY

## (undated) DEVICE — SURGICEL 2 X 3

## (undated) DEVICE — BLUE HEAT SCOPE WARMER

## (undated) DEVICE — GAUZE SPONGES,16 PLY: Brand: CURITY

## (undated) DEVICE — Device: Brand: TISSUE RETRIEVAL SYSTEM

## (undated) DEVICE — GLOVE PI ULTRA TOUCH SZ.6.5

## (undated) DEVICE — UTERINE MANIPULATOR RUMI 6.7 X 10 CM

## (undated) DEVICE — SKIN MARKER DUAL TIP WITH RULER CAP, FLEXIBLE RULER AND LABELS: Brand: DEVON

## (undated) DEVICE — TROCAR: Brand: KII SLEEVE

## (undated) DEVICE — PVC URETHRAL CATHETER: Brand: DOVER

## (undated) DEVICE — ENDOPATH PNEUMONEEDLE INSUFFLATION NEEDLES WITH LUER LOCK CONNECTORS 120MM: Brand: ENDOPATH

## (undated) DEVICE — SCD SEQUENTIAL COMPRESSION COMFORT SLEEVE MEDIUM KNEE LENGTH: Brand: KENDALL SCD

## (undated) DEVICE — ASTOUND STANDARD SURGICAL GOWN, XL: Brand: CONVERTORS

## (undated) DEVICE — GLOVE INDICATOR PI UNDERGLOVE SZ 6.5 BLUE

## (undated) DEVICE — LAPAROSCOPIC SMOKE EVAC TUBING

## (undated) DEVICE — PLASTIC ADHESIVE BANDAGE: Brand: CURITY

## (undated) DEVICE — LIGAMAX 5 MM ENDOSCOPIC MULTIPLE CLIP APPLIER: Brand: LIGAMAX

## (undated) DEVICE — SUT MONOCRYL 4-0 PS-2 27 IN Y426H

## (undated) DEVICE — GLOVE PI ULTRA TOUCH SZ.7.5

## (undated) DEVICE — HYDROPHILIC WOUND DRESSING WITH ZINC PLUS VITAMINS A AND B6.: Brand: DERMAGRAN®-B

## (undated) DEVICE — BETHLEHEM UNIVERSAL GYN LAP PK: Brand: CARDINAL HEALTH

## (undated) DEVICE — TELFA NON-ADHERENT ABSORBENT DRESSING: Brand: TELFA

## (undated) DEVICE — SUT VICRYL 0 CT-1 36 IN J946H

## (undated) DEVICE — MORCELLATOR LAP LINA XCISE 15 MM OBTURATOR CRDLS

## (undated) DEVICE — DRAPE LAPAROTOMY W/POUCHES

## (undated) DEVICE — GLOVE INDICATOR PI UNDERGLOVE SZ 8 BLUE

## (undated) DEVICE — MEDI-VAC YANKAUER SUCTION HANDLE W/STRAIGHT TIP & CONTROL VENT: Brand: CARDINAL HEALTH

## (undated) DEVICE — TOWEL SURG XR DETECT GREEN STRL RFD

## (undated) DEVICE — ADHESIVE SKIN HIGH VISCOSITY EXOFIN 1ML

## (undated) DEVICE — Device: Brand: OLYMPUS

## (undated) DEVICE — PACK C-SECTION PBDS